# Patient Record
Sex: FEMALE | Race: ASIAN | NOT HISPANIC OR LATINO
[De-identification: names, ages, dates, MRNs, and addresses within clinical notes are randomized per-mention and may not be internally consistent; named-entity substitution may affect disease eponyms.]

---

## 2017-02-28 ENCOUNTER — APPOINTMENT (OUTPATIENT)
Dept: NEUROLOGY | Facility: HOSPITAL | Age: 32
End: 2017-02-28

## 2017-02-28 ENCOUNTER — OUTPATIENT (OUTPATIENT)
Dept: OUTPATIENT SERVICES | Facility: HOSPITAL | Age: 32
LOS: 1 days | End: 2017-02-28

## 2017-02-28 VITALS
DIASTOLIC BLOOD PRESSURE: 84 MMHG | BODY MASS INDEX: 25.76 KG/M2 | HEART RATE: 84 BPM | WEIGHT: 140 LBS | HEIGHT: 62 IN | SYSTOLIC BLOOD PRESSURE: 127 MMHG

## 2017-02-28 DIAGNOSIS — G43.909 MIGRAINE, UNSPECIFIED, NOT INTRACTABLE, WITHOUT STATUS MIGRAINOSUS: ICD-10-CM

## 2017-02-28 DIAGNOSIS — M54.81 OCCIPITAL NEURALGIA: ICD-10-CM

## 2017-03-21 ENCOUNTER — APPOINTMENT (OUTPATIENT)
Dept: OBGYN | Facility: HOSPITAL | Age: 32
End: 2017-03-21

## 2017-05-30 ENCOUNTER — APPOINTMENT (OUTPATIENT)
Dept: NEUROLOGY | Facility: HOSPITAL | Age: 32
End: 2017-05-30

## 2017-05-30 ENCOUNTER — OUTPATIENT (OUTPATIENT)
Dept: OUTPATIENT SERVICES | Facility: HOSPITAL | Age: 32
LOS: 1 days | End: 2017-05-30

## 2017-05-30 VITALS
SYSTOLIC BLOOD PRESSURE: 112 MMHG | WEIGHT: 137 LBS | HEIGHT: 62 IN | BODY MASS INDEX: 25.21 KG/M2 | HEART RATE: 75 BPM | DIASTOLIC BLOOD PRESSURE: 72 MMHG

## 2017-05-30 DIAGNOSIS — G43.909 MIGRAINE, UNSPECIFIED, NOT INTRACTABLE, WITHOUT STATUS MIGRAINOSUS: ICD-10-CM

## 2017-05-30 RX ORDER — GABAPENTIN 100 MG/1
100 CAPSULE ORAL
Qty: 90 | Refills: 3 | Status: DISCONTINUED | COMMUNITY
Start: 2017-02-28 | End: 2017-05-30

## 2017-06-16 ENCOUNTER — APPOINTMENT (OUTPATIENT)
Dept: DERMATOLOGY | Facility: HOSPITAL | Age: 32
End: 2017-06-16

## 2017-06-20 ENCOUNTER — LABORATORY RESULT (OUTPATIENT)
Age: 32
End: 2017-06-20

## 2017-06-20 ENCOUNTER — OUTPATIENT (OUTPATIENT)
Dept: OUTPATIENT SERVICES | Facility: HOSPITAL | Age: 32
LOS: 1 days | End: 2017-06-20

## 2017-06-20 ENCOUNTER — APPOINTMENT (OUTPATIENT)
Dept: OBGYN | Facility: HOSPITAL | Age: 32
End: 2017-06-20

## 2017-06-20 ENCOUNTER — RESULT REVIEW (OUTPATIENT)
Age: 32
End: 2017-06-20

## 2017-06-20 VITALS
HEART RATE: 87 BPM | WEIGHT: 134 LBS | HEIGHT: 62 IN | DIASTOLIC BLOOD PRESSURE: 77 MMHG | BODY MASS INDEX: 24.66 KG/M2 | SYSTOLIC BLOOD PRESSURE: 118 MMHG

## 2017-06-20 DIAGNOSIS — R10.2 PELVIC AND PERINEAL PAIN: ICD-10-CM

## 2017-06-20 DIAGNOSIS — Z01.419 ENCOUNTER FOR GYNECOLOGICAL EXAMINATION (GENERAL) (ROUTINE) WITHOUT ABNORMAL FINDINGS: ICD-10-CM

## 2017-06-20 LAB
ALBUMIN SERPL ELPH-MCNC: 4.4 G/DL — SIGNIFICANT CHANGE UP (ref 3.3–5)
ALP SERPL-CCNC: 100 U/L — SIGNIFICANT CHANGE UP (ref 40–120)
ALT FLD-CCNC: 19 U/L — SIGNIFICANT CHANGE UP (ref 4–33)
AST SERPL-CCNC: 14 U/L — SIGNIFICANT CHANGE UP (ref 4–32)
BASOPHILS # BLD AUTO: 0.03 K/UL — SIGNIFICANT CHANGE UP (ref 0–0.2)
BASOPHILS NFR BLD AUTO: 0.4 % — SIGNIFICANT CHANGE UP (ref 0–2)
BILIRUB SERPL-MCNC: < 0.2 MG/DL — LOW (ref 0.2–1.2)
BUN SERPL-MCNC: 11 MG/DL — SIGNIFICANT CHANGE UP (ref 7–23)
CALCIUM SERPL-MCNC: 9.3 MG/DL — SIGNIFICANT CHANGE UP (ref 8.4–10.5)
CHLORIDE SERPL-SCNC: 101 MMOL/L — SIGNIFICANT CHANGE UP (ref 98–107)
CO2 SERPL-SCNC: 22 MMOL/L — SIGNIFICANT CHANGE UP (ref 22–31)
CREAT SERPL-MCNC: 0.59 MG/DL — SIGNIFICANT CHANGE UP (ref 0.5–1.3)
EOSINOPHIL # BLD AUTO: 0.49 K/UL — SIGNIFICANT CHANGE UP (ref 0–0.5)
EOSINOPHIL NFR BLD AUTO: 5.7 % — SIGNIFICANT CHANGE UP (ref 0–6)
GLUCOSE SERPL-MCNC: 99 MG/DL — SIGNIFICANT CHANGE UP (ref 70–99)
HBA1C BLD-MCNC: 6 % — HIGH (ref 4–5.6)
HCT VFR BLD CALC: 36.8 % — SIGNIFICANT CHANGE UP (ref 34.5–45)
HGB BLD-MCNC: 11 G/DL — LOW (ref 11.5–15.5)
HIV1 AG SER QL: SIGNIFICANT CHANGE UP
HIV1+2 AB SPEC QL: SIGNIFICANT CHANGE UP
IMM GRANULOCYTES NFR BLD AUTO: 0.2 % — SIGNIFICANT CHANGE UP (ref 0–1.5)
LYMPHOCYTES # BLD AUTO: 3.03 K/UL — SIGNIFICANT CHANGE UP (ref 1–3.3)
LYMPHOCYTES # BLD AUTO: 35.4 % — SIGNIFICANT CHANGE UP (ref 13–44)
MCHC RBC-ENTMCNC: 21.6 PG — LOW (ref 27–34)
MCHC RBC-ENTMCNC: 29.9 % — LOW (ref 32–36)
MCV RBC AUTO: 72.2 FL — LOW (ref 80–100)
MONOCYTES # BLD AUTO: 0.35 K/UL — SIGNIFICANT CHANGE UP (ref 0–0.9)
MONOCYTES NFR BLD AUTO: 4.1 % — SIGNIFICANT CHANGE UP (ref 2–14)
NEUTROPHILS # BLD AUTO: 4.63 K/UL — SIGNIFICANT CHANGE UP (ref 1.8–7.4)
NEUTROPHILS NFR BLD AUTO: 54.2 % — SIGNIFICANT CHANGE UP (ref 43–77)
PLATELET # BLD AUTO: 275 K/UL — SIGNIFICANT CHANGE UP (ref 150–400)
PMV BLD: 11.3 FL — SIGNIFICANT CHANGE UP (ref 7–13)
POTASSIUM SERPL-MCNC: 3.9 MMOL/L — SIGNIFICANT CHANGE UP (ref 3.5–5.3)
POTASSIUM SERPL-SCNC: 3.9 MMOL/L — SIGNIFICANT CHANGE UP (ref 3.5–5.3)
PROT SERPL-MCNC: 7.6 G/DL — SIGNIFICANT CHANGE UP (ref 6–8.3)
RBC # BLD: 5.1 M/UL — SIGNIFICANT CHANGE UP (ref 3.8–5.2)
RBC # FLD: 16.9 % — HIGH (ref 10.3–14.5)
SODIUM SERPL-SCNC: 139 MMOL/L — SIGNIFICANT CHANGE UP (ref 135–145)
WBC # BLD: 8.55 K/UL — SIGNIFICANT CHANGE UP (ref 3.8–10.5)
WBC # FLD AUTO: 8.55 K/UL — SIGNIFICANT CHANGE UP (ref 3.8–10.5)

## 2017-06-21 LAB
C TRACH RRNA SPEC QL NAA+PROBE: SIGNIFICANT CHANGE UP
HBV SURFACE AG SER-ACNC: NONREACTIVE — SIGNIFICANT CHANGE UP
HCV AB S/CO SERPL IA: 0.08 S/CO — SIGNIFICANT CHANGE UP
HCV AB SERPL-IMP: SIGNIFICANT CHANGE UP
N GONORRHOEA RRNA SPEC QL NAA+PROBE: SIGNIFICANT CHANGE UP
SPECIMEN SOURCE: SIGNIFICANT CHANGE UP
T PALLIDUM AB TITR SER: NEGATIVE — SIGNIFICANT CHANGE UP

## 2017-06-22 LAB
CANDIDA AB TITR SER: NOT DETECTED — SIGNIFICANT CHANGE UP
G VAGINALIS DNA SPEC QL NAA+PROBE: NOT DETECTED — SIGNIFICANT CHANGE UP
T VAGINALIS SPEC QL WET PREP: NOT DETECTED — SIGNIFICANT CHANGE UP

## 2017-07-21 ENCOUNTER — APPOINTMENT (OUTPATIENT)
Dept: DERMATOLOGY | Facility: HOSPITAL | Age: 32
End: 2017-07-21

## 2017-07-21 ENCOUNTER — OUTPATIENT (OUTPATIENT)
Dept: OUTPATIENT SERVICES | Facility: HOSPITAL | Age: 32
LOS: 1 days | End: 2017-07-21

## 2017-07-21 VITALS
HEART RATE: 70 BPM | BODY MASS INDEX: 24.84 KG/M2 | HEIGHT: 62 IN | DIASTOLIC BLOOD PRESSURE: 73 MMHG | WEIGHT: 135 LBS | SYSTOLIC BLOOD PRESSURE: 112 MMHG

## 2017-07-21 DIAGNOSIS — L30.9 DERMATITIS, UNSPECIFIED: ICD-10-CM

## 2017-07-21 DIAGNOSIS — L21.9 SEBORRHEIC DERMATITIS, UNSPECIFIED: ICD-10-CM

## 2017-09-05 ENCOUNTER — OUTPATIENT (OUTPATIENT)
Dept: OUTPATIENT SERVICES | Facility: HOSPITAL | Age: 32
LOS: 1 days | End: 2017-09-05

## 2017-09-05 ENCOUNTER — APPOINTMENT (OUTPATIENT)
Dept: NEUROLOGY | Facility: HOSPITAL | Age: 32
End: 2017-09-05

## 2017-09-05 VITALS
HEART RATE: 80 BPM | DIASTOLIC BLOOD PRESSURE: 79 MMHG | WEIGHT: 135 LBS | SYSTOLIC BLOOD PRESSURE: 110 MMHG | BODY MASS INDEX: 24.84 KG/M2 | HEIGHT: 62 IN

## 2017-10-06 ENCOUNTER — APPOINTMENT (OUTPATIENT)
Dept: DERMATOLOGY | Facility: HOSPITAL | Age: 32
End: 2017-10-06

## 2017-12-05 ENCOUNTER — OUTPATIENT (OUTPATIENT)
Dept: OUTPATIENT SERVICES | Facility: HOSPITAL | Age: 32
LOS: 1 days | End: 2017-12-05

## 2017-12-05 ENCOUNTER — APPOINTMENT (OUTPATIENT)
Dept: NEUROLOGY | Facility: HOSPITAL | Age: 32
End: 2017-12-05

## 2017-12-05 VITALS
DIASTOLIC BLOOD PRESSURE: 55 MMHG | HEIGHT: 62 IN | SYSTOLIC BLOOD PRESSURE: 108 MMHG | HEART RATE: 75 BPM | WEIGHT: 135 LBS | BODY MASS INDEX: 24.84 KG/M2

## 2018-01-02 ENCOUNTER — OUTPATIENT (OUTPATIENT)
Dept: OUTPATIENT SERVICES | Facility: HOSPITAL | Age: 33
LOS: 1 days | End: 2018-01-02

## 2018-01-02 ENCOUNTER — APPOINTMENT (OUTPATIENT)
Dept: NEUROLOGY | Facility: HOSPITAL | Age: 33
End: 2018-01-02

## 2018-01-02 VITALS
HEART RATE: 89 BPM | BODY MASS INDEX: 25.58 KG/M2 | WEIGHT: 139 LBS | DIASTOLIC BLOOD PRESSURE: 77 MMHG | HEIGHT: 62 IN | SYSTOLIC BLOOD PRESSURE: 120 MMHG

## 2018-01-02 DIAGNOSIS — G43.909 MIGRAINE, UNSPECIFIED, NOT INTRACTABLE, WITHOUT STATUS MIGRAINOSUS: ICD-10-CM

## 2018-01-05 ENCOUNTER — APPOINTMENT (OUTPATIENT)
Dept: DERMATOLOGY | Facility: HOSPITAL | Age: 33
End: 2018-01-05
Payer: MEDICAID

## 2018-01-05 ENCOUNTER — OUTPATIENT (OUTPATIENT)
Dept: OUTPATIENT SERVICES | Facility: HOSPITAL | Age: 33
LOS: 1 days | End: 2018-01-05

## 2018-01-05 VITALS
SYSTOLIC BLOOD PRESSURE: 116 MMHG | HEIGHT: 62 IN | HEART RATE: 81 BPM | WEIGHT: 135 LBS | DIASTOLIC BLOOD PRESSURE: 72 MMHG | BODY MASS INDEX: 24.84 KG/M2

## 2018-01-05 PROCEDURE — 99213 OFFICE O/P EST LOW 20 MIN: CPT | Mod: GC,NC

## 2018-01-08 DIAGNOSIS — L21.9 SEBORRHEIC DERMATITIS, UNSPECIFIED: ICD-10-CM

## 2018-01-08 DIAGNOSIS — L70.0 ACNE VULGARIS: ICD-10-CM

## 2018-03-02 ENCOUNTER — OUTPATIENT (OUTPATIENT)
Dept: OUTPATIENT SERVICES | Facility: HOSPITAL | Age: 33
LOS: 1 days | End: 2018-03-02

## 2018-03-02 ENCOUNTER — APPOINTMENT (OUTPATIENT)
Dept: DERMATOLOGY | Facility: HOSPITAL | Age: 33
End: 2018-03-02
Payer: MEDICAID

## 2018-03-02 VITALS
HEIGHT: 62 IN | DIASTOLIC BLOOD PRESSURE: 79 MMHG | BODY MASS INDEX: 25.58 KG/M2 | SYSTOLIC BLOOD PRESSURE: 113 MMHG | WEIGHT: 139 LBS | HEART RATE: 74 BPM

## 2018-03-02 PROCEDURE — 99212 OFFICE O/P EST SF 10 MIN: CPT | Mod: GC,NC

## 2018-03-05 DIAGNOSIS — L21.9 SEBORRHEIC DERMATITIS, UNSPECIFIED: ICD-10-CM

## 2018-03-05 DIAGNOSIS — L70.0 ACNE VULGARIS: ICD-10-CM

## 2018-03-06 ENCOUNTER — APPOINTMENT (OUTPATIENT)
Dept: NEUROLOGY | Facility: HOSPITAL | Age: 33
End: 2018-03-06

## 2018-03-06 ENCOUNTER — OUTPATIENT (OUTPATIENT)
Dept: OUTPATIENT SERVICES | Facility: HOSPITAL | Age: 33
LOS: 1 days | End: 2018-03-06

## 2018-03-06 VITALS
SYSTOLIC BLOOD PRESSURE: 122 MMHG | HEIGHT: 62 IN | WEIGHT: 139.5 LBS | BODY MASS INDEX: 25.67 KG/M2 | DIASTOLIC BLOOD PRESSURE: 81 MMHG | HEART RATE: 80 BPM

## 2018-03-07 DIAGNOSIS — G43.909 MIGRAINE, UNSPECIFIED, NOT INTRACTABLE, WITHOUT STATUS MIGRAINOSUS: ICD-10-CM

## 2018-04-03 ENCOUNTER — OUTPATIENT (OUTPATIENT)
Dept: OUTPATIENT SERVICES | Facility: HOSPITAL | Age: 33
LOS: 1 days | End: 2018-04-03

## 2018-04-03 ENCOUNTER — APPOINTMENT (OUTPATIENT)
Dept: NEUROLOGY | Facility: HOSPITAL | Age: 33
End: 2018-04-03

## 2018-04-03 VITALS
WEIGHT: 130 LBS | HEIGHT: 62 IN | DIASTOLIC BLOOD PRESSURE: 79 MMHG | SYSTOLIC BLOOD PRESSURE: 118 MMHG | BODY MASS INDEX: 23.92 KG/M2 | HEART RATE: 78 BPM

## 2018-04-04 DIAGNOSIS — G43.909 MIGRAINE, UNSPECIFIED, NOT INTRACTABLE, WITHOUT STATUS MIGRAINOSUS: ICD-10-CM

## 2018-06-01 ENCOUNTER — APPOINTMENT (OUTPATIENT)
Dept: DERMATOLOGY | Facility: HOSPITAL | Age: 33
End: 2018-06-01
Payer: MEDICAID

## 2018-06-01 ENCOUNTER — OUTPATIENT (OUTPATIENT)
Dept: OUTPATIENT SERVICES | Facility: HOSPITAL | Age: 33
LOS: 1 days | End: 2018-06-01

## 2018-06-01 VITALS
DIASTOLIC BLOOD PRESSURE: 73 MMHG | HEIGHT: 62 IN | BODY MASS INDEX: 24.29 KG/M2 | WEIGHT: 132 LBS | SYSTOLIC BLOOD PRESSURE: 117 MMHG | HEART RATE: 88 BPM

## 2018-06-01 DIAGNOSIS — L70.0 ACNE VULGARIS: ICD-10-CM

## 2018-06-01 DIAGNOSIS — L21.9 SEBORRHEIC DERMATITIS, UNSPECIFIED: ICD-10-CM

## 2018-06-01 PROCEDURE — 99213 OFFICE O/P EST LOW 20 MIN: CPT | Mod: NC

## 2018-06-05 ENCOUNTER — OUTPATIENT (OUTPATIENT)
Dept: OUTPATIENT SERVICES | Facility: HOSPITAL | Age: 33
LOS: 1 days | End: 2018-06-05

## 2018-06-05 ENCOUNTER — APPOINTMENT (OUTPATIENT)
Dept: NEUROLOGY | Facility: HOSPITAL | Age: 33
End: 2018-06-05

## 2018-06-05 VITALS
WEIGHT: 135 LBS | DIASTOLIC BLOOD PRESSURE: 62 MMHG | HEART RATE: 72 BPM | SYSTOLIC BLOOD PRESSURE: 111 MMHG | BODY MASS INDEX: 24.84 KG/M2 | HEIGHT: 62 IN

## 2018-06-05 DIAGNOSIS — G89.29 OTHER CHRONIC PAIN: ICD-10-CM

## 2018-08-21 ENCOUNTER — LABORATORY RESULT (OUTPATIENT)
Age: 33
End: 2018-08-21

## 2018-08-21 ENCOUNTER — APPOINTMENT (OUTPATIENT)
Dept: OBGYN | Facility: HOSPITAL | Age: 33
End: 2018-08-21

## 2018-08-21 ENCOUNTER — OUTPATIENT (OUTPATIENT)
Dept: OUTPATIENT SERVICES | Facility: HOSPITAL | Age: 33
LOS: 1 days | End: 2018-08-21

## 2018-08-21 VITALS
HEIGHT: 62 IN | HEART RATE: 89 BPM | WEIGHT: 140 LBS | BODY MASS INDEX: 25.76 KG/M2 | SYSTOLIC BLOOD PRESSURE: 114 MMHG | DIASTOLIC BLOOD PRESSURE: 81 MMHG

## 2018-08-21 LAB
ALBUMIN SERPL ELPH-MCNC: 4.7 G/DL — SIGNIFICANT CHANGE UP (ref 3.3–5)
ALP SERPL-CCNC: 121 U/L — HIGH (ref 40–120)
ALT FLD-CCNC: 15 U/L — SIGNIFICANT CHANGE UP (ref 4–33)
AST SERPL-CCNC: 18 U/L — SIGNIFICANT CHANGE UP (ref 4–32)
BASOPHILS # BLD AUTO: 0.09 K/UL — SIGNIFICANT CHANGE UP (ref 0–0.2)
BASOPHILS NFR BLD AUTO: 1 % — SIGNIFICANT CHANGE UP (ref 0–2)
BILIRUB SERPL-MCNC: < 0.2 MG/DL — LOW (ref 0.2–1.2)
BUN SERPL-MCNC: 13 MG/DL — SIGNIFICANT CHANGE UP (ref 7–23)
CALCIUM SERPL-MCNC: 9.5 MG/DL — SIGNIFICANT CHANGE UP (ref 8.4–10.5)
CHLORIDE SERPL-SCNC: 103 MMOL/L — SIGNIFICANT CHANGE UP (ref 98–107)
CO2 SERPL-SCNC: 23 MMOL/L — SIGNIFICANT CHANGE UP (ref 22–31)
CREAT SERPL-MCNC: 0.75 MG/DL — SIGNIFICANT CHANGE UP (ref 0.5–1.3)
EOSINOPHIL # BLD AUTO: 0.65 K/UL — HIGH (ref 0–0.5)
EOSINOPHIL NFR BLD AUTO: 7.4 % — HIGH (ref 0–6)
GLUCOSE SERPL-MCNC: 74 MG/DL — SIGNIFICANT CHANGE UP (ref 70–99)
HCT VFR BLD CALC: 38.5 % — SIGNIFICANT CHANGE UP (ref 34.5–45)
HGB BLD-MCNC: 11.5 G/DL — SIGNIFICANT CHANGE UP (ref 11.5–15.5)
IMM GRANULOCYTES # BLD AUTO: 0.04 # — SIGNIFICANT CHANGE UP
IMM GRANULOCYTES NFR BLD AUTO: 0.5 % — SIGNIFICANT CHANGE UP (ref 0–1.5)
LYMPHOCYTES # BLD AUTO: 3.34 K/UL — HIGH (ref 1–3.3)
LYMPHOCYTES # BLD AUTO: 37.8 % — SIGNIFICANT CHANGE UP (ref 13–44)
MCHC RBC-ENTMCNC: 21.8 PG — LOW (ref 27–34)
MCHC RBC-ENTMCNC: 29.9 % — LOW (ref 32–36)
MCV RBC AUTO: 73.1 FL — LOW (ref 80–100)
MONOCYTES # BLD AUTO: 0.42 K/UL — SIGNIFICANT CHANGE UP (ref 0–0.9)
MONOCYTES NFR BLD AUTO: 4.8 % — SIGNIFICANT CHANGE UP (ref 2–14)
NEUTROPHILS # BLD AUTO: 4.29 K/UL — SIGNIFICANT CHANGE UP (ref 1.8–7.4)
NEUTROPHILS NFR BLD AUTO: 48.5 % — SIGNIFICANT CHANGE UP (ref 43–77)
NRBC # FLD: 0 — SIGNIFICANT CHANGE UP
PLATELET # BLD AUTO: 303 K/UL — SIGNIFICANT CHANGE UP (ref 150–400)
PMV BLD: 11.5 FL — SIGNIFICANT CHANGE UP (ref 7–13)
POTASSIUM SERPL-MCNC: 3.9 MMOL/L — SIGNIFICANT CHANGE UP (ref 3.5–5.3)
POTASSIUM SERPL-SCNC: 3.9 MMOL/L — SIGNIFICANT CHANGE UP (ref 3.5–5.3)
PROT SERPL-MCNC: 8.5 G/DL — HIGH (ref 6–8.3)
RBC # BLD: 5.27 M/UL — HIGH (ref 3.8–5.2)
RBC # FLD: 16.4 % — HIGH (ref 10.3–14.5)
SODIUM SERPL-SCNC: 138 MMOL/L — SIGNIFICANT CHANGE UP (ref 135–145)
WBC # BLD: 8.83 K/UL — SIGNIFICANT CHANGE UP (ref 3.8–10.5)
WBC # FLD AUTO: 8.83 K/UL — SIGNIFICANT CHANGE UP (ref 3.8–10.5)

## 2018-08-22 DIAGNOSIS — Z01.419 ENCOUNTER FOR GYNECOLOGICAL EXAMINATION (GENERAL) (ROUTINE) WITHOUT ABNORMAL FINDINGS: ICD-10-CM

## 2018-08-22 LAB
HBV SURFACE AG SER-ACNC: NONREACTIVE — SIGNIFICANT CHANGE UP
HCV AB S/CO SERPL IA: 0.07 S/CO — SIGNIFICANT CHANGE UP
HCV AB SERPL-IMP: SIGNIFICANT CHANGE UP
HIV 1+2 AB+HIV1 P24 AG SERPL QL IA: SIGNIFICANT CHANGE UP
T PALLIDUM AB TITR SER: NEGATIVE — SIGNIFICANT CHANGE UP

## 2018-08-23 LAB
C TRACH RRNA SPEC QL NAA+PROBE: SIGNIFICANT CHANGE UP
N GONORRHOEA RRNA SPEC QL NAA+PROBE: SIGNIFICANT CHANGE UP
SPECIMEN SOURCE: SIGNIFICANT CHANGE UP

## 2018-09-11 ENCOUNTER — OUTPATIENT (OUTPATIENT)
Dept: OUTPATIENT SERVICES | Facility: HOSPITAL | Age: 33
LOS: 1 days | End: 2018-09-11

## 2018-09-11 ENCOUNTER — APPOINTMENT (OUTPATIENT)
Dept: NEUROLOGY | Facility: HOSPITAL | Age: 33
End: 2018-09-11

## 2018-09-11 VITALS
WEIGHT: 139 LBS | SYSTOLIC BLOOD PRESSURE: 106 MMHG | HEART RATE: 87 BPM | DIASTOLIC BLOOD PRESSURE: 70 MMHG | BODY MASS INDEX: 25.58 KG/M2 | HEIGHT: 62 IN

## 2018-09-11 DIAGNOSIS — G43.909 MIGRAINE, UNSPECIFIED, NOT INTRACTABLE, W/OUT STATUS MIGRAINOSUS: ICD-10-CM

## 2018-09-11 DIAGNOSIS — G43.909 MIGRAINE, UNSPECIFIED, NOT INTRACTABLE, WITHOUT STATUS MIGRAINOSUS: ICD-10-CM

## 2018-10-19 ENCOUNTER — APPOINTMENT (OUTPATIENT)
Dept: DERMATOLOGY | Facility: HOSPITAL | Age: 33
End: 2018-10-19

## 2018-10-29 ENCOUNTER — APPOINTMENT (OUTPATIENT)
Dept: OTOLARYNGOLOGY | Facility: CLINIC | Age: 33
End: 2018-10-29
Payer: MEDICAID

## 2018-10-29 ENCOUNTER — OUTPATIENT (OUTPATIENT)
Dept: OUTPATIENT SERVICES | Facility: HOSPITAL | Age: 33
LOS: 1 days | Discharge: HOME | End: 2018-10-29

## 2018-10-29 VITALS
HEIGHT: 62 IN | DIASTOLIC BLOOD PRESSURE: 81 MMHG | SYSTOLIC BLOOD PRESSURE: 121 MMHG | BODY MASS INDEX: 25.58 KG/M2 | WEIGHT: 139 LBS | HEART RATE: 90 BPM

## 2018-10-29 DIAGNOSIS — J30.9 ALLERGIC RHINITIS, UNSPECIFIED: ICD-10-CM

## 2018-10-29 PROCEDURE — 31231 NASAL ENDOSCOPY DX: CPT

## 2018-10-29 PROCEDURE — 99203 OFFICE O/P NEW LOW 30 MIN: CPT | Mod: 25

## 2018-11-02 DIAGNOSIS — J30.9 ALLERGIC RHINITIS, UNSPECIFIED: ICD-10-CM

## 2019-03-14 ENCOUNTER — OUTPATIENT (OUTPATIENT)
Dept: OUTPATIENT SERVICES | Facility: HOSPITAL | Age: 34
LOS: 1 days | End: 2019-03-14
Payer: COMMERCIAL

## 2019-03-14 ENCOUNTER — APPOINTMENT (OUTPATIENT)
Dept: NEUROLOGY | Facility: HOSPITAL | Age: 34
End: 2019-03-14

## 2019-03-14 VITALS
HEART RATE: 80 BPM | WEIGHT: 139 LBS | RESPIRATION RATE: 14 BRPM | SYSTOLIC BLOOD PRESSURE: 115 MMHG | BODY MASS INDEX: 25.58 KG/M2 | DIASTOLIC BLOOD PRESSURE: 81 MMHG | HEIGHT: 62 IN

## 2019-03-14 DIAGNOSIS — R56.9 UNSPECIFIED CONVULSIONS: ICD-10-CM

## 2019-03-14 DIAGNOSIS — G43.709 CHRONIC MIGRAINE W/OUT AURA, NOT INTRACTABLE, W/OUT STATUS MIGRAINOSUS: ICD-10-CM

## 2019-03-14 DIAGNOSIS — G43.709 CHRONIC MIGRAINE WITHOUT AURA, NOT INTRACTABLE, WITHOUT STATUS MIGRAINOSUS: ICD-10-CM

## 2019-03-14 PROCEDURE — G0463: CPT

## 2019-03-14 RX ORDER — TOPIRAMATE 25 MG/1
25 TABLET, FILM COATED ORAL
Qty: 60 | Refills: 2 | Status: DISCONTINUED | COMMUNITY
Start: 2017-12-05 | End: 2019-03-14

## 2019-03-14 RX ORDER — TRIAMCINOLONE ACETONIDE 1 MG/G
0.1 CREAM TOPICAL TWICE DAILY
Qty: 1 | Refills: 3 | Status: COMPLETED | COMMUNITY
Start: 2017-07-21 | End: 2019-03-14

## 2019-03-14 RX ORDER — CLOBETASOL PROPIONATE 0.5 MG/G
0.05 AEROSOL, FOAM TOPICAL
Qty: 1 | Refills: 3 | Status: COMPLETED | COMMUNITY
Start: 2017-07-21 | End: 2019-03-14

## 2019-03-14 RX ORDER — FLUOCINONIDE 0.5 MG/ML
0.05 SOLUTION TOPICAL
Qty: 1 | Refills: 3 | Status: COMPLETED | COMMUNITY
Start: 2018-01-05 | End: 2019-03-14

## 2019-03-14 RX ORDER — SUMATRIPTAN 100 MG/1
100 TABLET, FILM COATED ORAL
Qty: 9 | Refills: 0 | Status: COMPLETED | COMMUNITY
Start: 2017-12-05 | End: 2019-03-14

## 2019-03-14 NOTE — PHYSICAL EXAM
[Person] : oriented to person [Place] : oriented to place [Time] : oriented to time [Concentration Intact] : normal concentrating ability [Visual Intact] : visual attention was ~T not ~L decreased [Naming Objects] : no difficulty naming common objects [Repeating Phrases] : no difficulty repeating a phrase [Writing A Sentence] : no difficulty writing a sentence [Fluency] : fluency intact [Comprehension] : comprehension intact [Reading] : reading intact [Past History] : adequate knowledge of personal past history [Cranial Nerves Optic (II)] : visual acuity intact bilaterally,  visual fields full to confrontation, pupils equal round and reactive to light [Cranial Nerves Oculomotor (III)] : extraocular motion intact [Cranial Nerves Trigeminal (V)] : facial sensation intact symmetrically [Cranial Nerves Facial (VII)] : face symmetrical [Cranial Nerves Vestibulocochlear (VIII)] : hearing was intact bilaterally [Cranial Nerves Glossopharyngeal (IX)] : tongue and palate midline [Cranial Nerves Accessory (XI - Cranial And Spinal)] : head turning and shoulder shrug symmetric [Cranial Nerves Hypoglossal (XII)] : there was no tongue deviation with protrusion [Motor Tone] : muscle tone was normal in all four extremities [Motor Strength] : muscle strength was normal in all four extremities [No Muscle Atrophy] : normal bulk in all four extremities [Sensation Tactile Decrease] : light touch was intact [Balance] : balance was intact [2+] : Ankle jerk left 2+ [Sclera] : the sclera and conjunctiva were normal [PERRL With Normal Accommodation] : pupils were equal in size, round, reactive to light, with normal accommodation [Neck Appearance] : the appearance of the neck was normal [] : no respiratory distress [Abnormal Walk] : normal gait [Skin Color & Pigmentation] : normal skin color and pigmentation [Past-pointing] : there was no past-pointing [Tremor] : no tremor present [Plantar Reflex Right Only] : normal on the right [Plantar Reflex Left Only] : normal on the left

## 2019-03-14 NOTE — DISCUSSION/SUMMARY
[FreeTextEntry1] : 31YO woman with history of migraines here for follow up. Patient is taking topiramate 50 mg daily without adverse effects. Takes CoQ10 as well and Rizatriptan as abortive. Headaches are stable, however she continues to have HA 2-3x/week. Will increase Topamax.\par \par Plan\par 1. Increase Topamax to 25mg am/ 50mg qhs (script for 25mg given)\par 2. Continue Co-Q 10 and Rizatriptan as abortive. \par 3. F/u in 3 months

## 2019-03-14 NOTE — HISTORY OF PRESENT ILLNESS
[FreeTextEntry1] : 33 yo woman with history of migraines here for follow up. \par headache frequency has remained stable 2-3x/wk although she notes improvement in severity/quality of headache. She notes she gets worse migraines about 2-3 d prior to her menses lasting a few days consecutively. Continues to take Topamax 50 daily with no side effects. Takes CoQ 10. Takes Rizatriptan as abortive - uses it about once every 2 months. \par Currently has a HA ~5-6/10, has had it daily since last Friday, which was a few days before her menses began. Last use Rizatriptan last week x1. \par No change in the quality of her headaches. No associated vision changes, numbness/tingling or weakness. No dizziness/lightheadedness.

## 2019-04-02 ENCOUNTER — APPOINTMENT (OUTPATIENT)
Dept: NEUROLOGY | Facility: HOSPITAL | Age: 34
End: 2019-04-02

## 2019-06-04 ENCOUNTER — APPOINTMENT (OUTPATIENT)
Dept: NEUROLOGY | Facility: HOSPITAL | Age: 34
End: 2019-06-04

## 2019-06-13 ENCOUNTER — APPOINTMENT (OUTPATIENT)
Dept: NEUROLOGY | Facility: HOSPITAL | Age: 34
End: 2019-06-13

## 2019-09-05 ENCOUNTER — OUTPATIENT (OUTPATIENT)
Dept: OUTPATIENT SERVICES | Facility: HOSPITAL | Age: 34
LOS: 1 days | End: 2019-09-05
Payer: COMMERCIAL

## 2019-09-05 ENCOUNTER — APPOINTMENT (OUTPATIENT)
Dept: NEUROLOGY | Facility: HOSPITAL | Age: 34
End: 2019-09-05

## 2019-09-05 VITALS
DIASTOLIC BLOOD PRESSURE: 72 MMHG | BODY MASS INDEX: 26.13 KG/M2 | HEIGHT: 62 IN | SYSTOLIC BLOOD PRESSURE: 106 MMHG | HEART RATE: 85 BPM | WEIGHT: 142 LBS

## 2019-09-05 DIAGNOSIS — R56.9 UNSPECIFIED CONVULSIONS: ICD-10-CM

## 2019-09-05 DIAGNOSIS — G43.909 MIGRAINE, UNSPECIFIED, NOT INTRACTABLE, WITHOUT STATUS MIGRAINOSUS: ICD-10-CM

## 2019-09-05 PROCEDURE — G0463: CPT

## 2019-09-05 NOTE — DISCUSSION/SUMMARY
[FreeTextEntry1] : 31YO woman with history of migraines here for follow up. Patient is taking topiramate 25mg / 50 mg daily without adverse effects. Takes CoQ10 as well and Rizatriptan as abortive but seldomly. Headaches are stable and much less frequent. \par \par Plan\par 1. c/w Topamax to 25mg am / 50mg qhs (script for 25mg given and instructed to take two pills at night)\par 2. Continue Co-Q 10 and Rizatriptan as abortive. \par 3. F/u in 4-6  months. \par 4. ENT fu and PMD annual check up advised\par

## 2019-09-05 NOTE — PHYSICAL EXAM
[FreeTextEntry1] : Neurologic: \par Orientation: oriented to person, oriented to place and oriented to time. \par Attention: normal concentrating ability and visual attention was not decreased. \par Language: no difficulty naming common objects, no difficulty repeating a phrase, no difficulty writing a sentence, fluency intact, comprehension intact and reading intact. \par Fund of knowledge: displays adequate knowledge of personal past history. \par Cranial Nerves: visual acuity intact bilaterally, visual fields full to confrontation, pupils equal round and reactive to light, extraocular motion intact, facial sensation intact symmetrically, face symmetrical, hearing was intact bilaterally, tongue and palate midline, head turning and shoulder shrug symmetric and there was no tongue deviation with protrusion. \par Motor: muscle tone was normal in all four extremities, muscle strength was normal in all four extremities and normal bulk in all four extremities. \par Sensory exam: light touch was intact. \par Coordination:. normal gait. balance was intact. there was no past-pointing. no tremor present. \par Deep tendon reflexes: 2+ throughout\par Plantar responses normal on the right, normal on the left.  \par Eyes: the sclera and conjunctiva were normal and pupils were equal in size, round, reactive to light, with normal accommodation. \par Neck: the appearance of the neck was normal. \par Pulmonary: no respiratory distress. \par Musculoskeletal: normal gait. \par Skin: normal skin color and pigmentation. \par

## 2019-09-05 NOTE — HISTORY OF PRESENT ILLNESS
[FreeTextEntry1] : Interval history - Pt has been doing well on topomax. She reports a significant decrease in headache frequency. However does report a burning tingling sensation at the top of her head when she does get a headache. She also reports having sinus problems and is able to differentiate between a headache and sinus symptoms. She saw an ENT doctor last year for which the pt reports she received a washout and needs to follow up again. Overall pt feels she is much better as far as the headache.\par \par Prior history - 31 yo woman with history of migraines here for follow up. \par headache frequency has remained stable 2-3x/wk although she notes improvement in severity/quality of headache. She notes she gets worse migraines about 2-3 d prior to her menses lasting a few days consecutively. Continues to take Topamax 50 daily with no side effects. Takes CoQ 10. Takes Rizatriptan as abortive - uses it about once every 2 months. \par Currently has a HA ~5-6/10, has had it daily since last Friday, which was a few days before her menses began. Last use Rizatriptan last week x1. \par No change in the quality of her headaches. No associated vision changes, numbness/tingling or weakness. No dizziness/lightheadedness. \par

## 2019-10-18 ENCOUNTER — EMERGENCY (EMERGENCY)
Facility: HOSPITAL | Age: 34
LOS: 1 days | Discharge: HOME | End: 2019-10-18
Attending: EMERGENCY MEDICINE | Admitting: EMERGENCY MEDICINE
Payer: MEDICAID

## 2019-10-18 VITALS
RESPIRATION RATE: 16 BRPM | HEART RATE: 60 BPM | SYSTOLIC BLOOD PRESSURE: 109 MMHG | TEMPERATURE: 98 F | DIASTOLIC BLOOD PRESSURE: 57 MMHG | OXYGEN SATURATION: 100 %

## 2019-10-18 LAB
ALBUMIN SERPL ELPH-MCNC: 4.6 G/DL — SIGNIFICANT CHANGE UP (ref 3.3–5)
ALP SERPL-CCNC: 119 U/L — SIGNIFICANT CHANGE UP (ref 40–120)
ALT FLD-CCNC: 17 U/L — SIGNIFICANT CHANGE UP (ref 4–33)
ANION GAP SERPL CALC-SCNC: 13 MMO/L — SIGNIFICANT CHANGE UP (ref 7–14)
AST SERPL-CCNC: 16 U/L — SIGNIFICANT CHANGE UP (ref 4–32)
BASE EXCESS BLDV CALC-SCNC: 0.5 MMOL/L — SIGNIFICANT CHANGE UP
BASOPHILS # BLD AUTO: 0.05 K/UL — SIGNIFICANT CHANGE UP (ref 0–0.2)
BASOPHILS NFR BLD AUTO: 0.4 % — SIGNIFICANT CHANGE UP (ref 0–2)
BILIRUB SERPL-MCNC: 0.3 MG/DL — SIGNIFICANT CHANGE UP (ref 0.2–1.2)
BLOOD GAS VENOUS - CREATININE: 0.51 MG/DL — SIGNIFICANT CHANGE UP (ref 0.5–1.3)
BUN SERPL-MCNC: 12 MG/DL — SIGNIFICANT CHANGE UP (ref 7–23)
CALCIUM SERPL-MCNC: 9.5 MG/DL — SIGNIFICANT CHANGE UP (ref 8.4–10.5)
CHLORIDE BLDV-SCNC: 103 MMOL/L — SIGNIFICANT CHANGE UP (ref 96–108)
CHLORIDE SERPL-SCNC: 100 MMOL/L — SIGNIFICANT CHANGE UP (ref 98–107)
CO2 SERPL-SCNC: 22 MMOL/L — SIGNIFICANT CHANGE UP (ref 22–31)
CREAT SERPL-MCNC: 0.48 MG/DL — LOW (ref 0.5–1.3)
EOSINOPHIL # BLD AUTO: 0.05 K/UL — SIGNIFICANT CHANGE UP (ref 0–0.5)
EOSINOPHIL NFR BLD AUTO: 0.4 % — SIGNIFICANT CHANGE UP (ref 0–6)
GAS PNL BLDV: 135 MMOL/L — LOW (ref 136–146)
GLUCOSE BLDV-MCNC: 113 MG/DL — HIGH (ref 70–99)
GLUCOSE SERPL-MCNC: 113 MG/DL — HIGH (ref 70–99)
HCG SERPL-ACNC: < 5 MIU/ML — SIGNIFICANT CHANGE UP
HCO3 BLDV-SCNC: 24 MMOL/L — SIGNIFICANT CHANGE UP (ref 20–27)
HCT VFR BLD CALC: 40.3 % — SIGNIFICANT CHANGE UP (ref 34.5–45)
HCT VFR BLDV CALC: 36.1 % — SIGNIFICANT CHANGE UP (ref 34.5–45)
HGB BLD-MCNC: 11.6 G/DL — SIGNIFICANT CHANGE UP (ref 11.5–15.5)
HGB BLDV-MCNC: 11.7 G/DL — SIGNIFICANT CHANGE UP (ref 11.5–15.5)
IMM GRANULOCYTES NFR BLD AUTO: 0.4 % — SIGNIFICANT CHANGE UP (ref 0–1.5)
LACTATE BLDV-MCNC: 1.7 MMOL/L — SIGNIFICANT CHANGE UP (ref 0.5–2)
LYMPHOCYTES # BLD AUTO: 1.46 K/UL — SIGNIFICANT CHANGE UP (ref 1–3.3)
LYMPHOCYTES # BLD AUTO: 10.9 % — LOW (ref 13–44)
MAGNESIUM SERPL-MCNC: 1.6 MG/DL — SIGNIFICANT CHANGE UP (ref 1.6–2.6)
MCHC RBC-ENTMCNC: 20.5 PG — LOW (ref 27–34)
MCHC RBC-ENTMCNC: 28.8 % — LOW (ref 32–36)
MCV RBC AUTO: 71.1 FL — LOW (ref 80–100)
MONOCYTES # BLD AUTO: 0.33 K/UL — SIGNIFICANT CHANGE UP (ref 0–0.9)
MONOCYTES NFR BLD AUTO: 2.5 % — SIGNIFICANT CHANGE UP (ref 2–14)
NEUTROPHILS # BLD AUTO: 11.5 K/UL — HIGH (ref 1.8–7.4)
NEUTROPHILS NFR BLD AUTO: 85.4 % — HIGH (ref 43–77)
NRBC # FLD: 0 K/UL — SIGNIFICANT CHANGE UP (ref 0–0)
PCO2 BLDV: 48 MMHG — SIGNIFICANT CHANGE UP (ref 41–51)
PH BLDV: 7.34 PH — SIGNIFICANT CHANGE UP (ref 7.32–7.43)
PHOSPHATE SERPL-MCNC: 2.7 MG/DL — SIGNIFICANT CHANGE UP (ref 2.5–4.5)
PLATELET # BLD AUTO: 307 K/UL — SIGNIFICANT CHANGE UP (ref 150–400)
PMV BLD: 12 FL — SIGNIFICANT CHANGE UP (ref 7–13)
PO2 BLDV: 31 MMHG — LOW (ref 35–40)
POTASSIUM BLDV-SCNC: 3.9 MMOL/L — SIGNIFICANT CHANGE UP (ref 3.4–4.5)
POTASSIUM SERPL-MCNC: 4.1 MMOL/L — SIGNIFICANT CHANGE UP (ref 3.5–5.3)
POTASSIUM SERPL-SCNC: 4.1 MMOL/L — SIGNIFICANT CHANGE UP (ref 3.5–5.3)
PROT SERPL-MCNC: 8.3 G/DL — SIGNIFICANT CHANGE UP (ref 6–8.3)
RBC # BLD: 5.67 M/UL — HIGH (ref 3.8–5.2)
RBC # FLD: 16.9 % — HIGH (ref 10.3–14.5)
SAO2 % BLDV: 52.6 % — LOW (ref 60–85)
SODIUM SERPL-SCNC: 135 MMOL/L — SIGNIFICANT CHANGE UP (ref 135–145)
WBC # BLD: 13.45 K/UL — HIGH (ref 3.8–10.5)
WBC # FLD AUTO: 13.45 K/UL — HIGH (ref 3.8–10.5)

## 2019-10-18 PROCEDURE — 99284 EMERGENCY DEPT VISIT MOD MDM: CPT

## 2019-10-18 PROCEDURE — 70450 CT HEAD/BRAIN W/O DYE: CPT | Mod: 26

## 2019-10-18 RX ORDER — METOCLOPRAMIDE HCL 10 MG
10 TABLET ORAL ONCE
Refills: 0 | Status: COMPLETED | OUTPATIENT
Start: 2019-10-18 | End: 2019-10-18

## 2019-10-18 RX ORDER — SODIUM CHLORIDE 9 MG/ML
1000 INJECTION INTRAMUSCULAR; INTRAVENOUS; SUBCUTANEOUS ONCE
Refills: 0 | Status: COMPLETED | OUTPATIENT
Start: 2019-10-18 | End: 2019-10-18

## 2019-10-18 RX ORDER — KETOROLAC TROMETHAMINE 30 MG/ML
30 SYRINGE (ML) INJECTION ONCE
Refills: 0 | Status: DISCONTINUED | OUTPATIENT
Start: 2019-10-18 | End: 2019-10-18

## 2019-10-18 RX ORDER — FAMOTIDINE 10 MG/ML
20 INJECTION INTRAVENOUS ONCE
Refills: 0 | Status: COMPLETED | OUTPATIENT
Start: 2019-10-18 | End: 2019-10-18

## 2019-10-18 RX ADMIN — Medication 10 MILLIGRAM(S): at 14:30

## 2019-10-18 RX ADMIN — FAMOTIDINE 20 MILLIGRAM(S): 10 INJECTION INTRAVENOUS at 14:30

## 2019-10-18 RX ADMIN — SODIUM CHLORIDE 1000 MILLILITER(S): 9 INJECTION INTRAMUSCULAR; INTRAVENOUS; SUBCUTANEOUS at 14:30

## 2019-10-18 RX ADMIN — Medication 30 MILLILITER(S): at 14:45

## 2019-10-18 RX ADMIN — Medication 30 MILLIGRAM(S): at 15:59

## 2019-10-18 NOTE — ED ADULT TRIAGE NOTE - CHIEF COMPLAINT QUOTE
Pt. c/o epigastric pain starting this AM with 11 episodes of vomiting. Denies diarrhea or fevers. No pmhx

## 2019-10-18 NOTE — ED PROVIDER NOTE - CLINICAL SUMMARY MEDICAL DECISION MAKING FREE TEXT BOX
34F Hx of migraines p/w severe HA worse than prior migraine and associated N/V and epigastric pain. CTH to r/o bleed. Possible gastroenteritis vs migraine

## 2019-10-18 NOTE — ED PROVIDER NOTE - NSFOLLOWUPINSTRUCTIONS_ED_ALL_ED_FT
You came to the hospital with headaches as well as nausea and vomiting. CT scan of your head did not show any cause of your headache. You symptoms improved with medications. If your symptoms worsen please return to the emergency department.

## 2019-10-18 NOTE — ED PROVIDER NOTE - ATTENDING CONTRIBUTION TO CARE
Dr. Lockhart:  I have personally performed a face to face bedside history and physical examination of this patient. I have discussed the history, examination, review of systems, assessment and plan of management with the resident. I have reviewed the electronic medical record and amended it to reflect my history, review of systems, physical exam, assessment and plan.    34F h/o migraines presents with severe headache, N/V, epigastric pain since this morning.      Exam:  - appears uncomfortable  - rrr  - ctab  - abd soft ntnd  - no focal neuro deficits    A/P  - likely migraine recurrence, eval pancreatitis, check ucg  - cbc, cmp, lipase, ua, urine culture, urine preg  - reglan, IVF Dr. Lockhart:  I have personally performed a face to face bedside history and physical examination of this patient. I have discussed the history, examination, review of systems, assessment and plan of management with the resident. I have reviewed the electronic medical record and amended it to reflect my history, review of systems, physical exam, assessment and plan.    34F h/o migraines presents with severe headache, N/V, epigastric pain since this morning.      Exam:  - appears uncomfortable  - rrr  - ctab  - abd soft ntnd  - no focal neuro deficits    A/P  - likely migraine recurrence, r/o intracranial pathology, eval pancreatitis, check ucg  - cbc, cmp, lipase, ua, urine culture, urine preg  - reglan, IVF. CT head

## 2019-10-18 NOTE — ED PROVIDER NOTE - PATIENT PORTAL LINK FT
You can access the FollowMyHealth Patient Portal offered by [facility] by registering at the following website: [email or web address]. By joining [employer]’s FollowMyHealth portal, you will also be able to view your health information using other applications (apps) compatible with our system.

## 2019-10-18 NOTE — ED PROVIDER NOTE - OBJECTIVE STATEMENT
Pt is a 35y/o F with PMHx of Migraines p/w N/V, HA and epigastric pain.  Pt reports Sx began at 6am this morning when she woke up. Has had emesis x12 (watery/yellow) since this morning, burning epigastric pain and severe burning HA. Pain woke her from sleep. Pt reports prior migraines with nausea that resolved after emesis, but has never had Sx this severe. No fever/chills, Pt is a 35y/o F with PMHx of Migraines p/w N/V, HA and epigastric pain.  Pt reports Sx began at 6am this morning when she woke up. Has had emesis x12 (watery/yellow) since this morning, burning epigastric pain and severe burning HA. Pain woke her from sleep. Pt reports prior migraines with nausea that resolved after emesis, but has never had Sx this severe. No fever/chills, no CP, no vision changes. No dietary changes, recent travel or sick contacts.

## 2020-11-08 ENCOUNTER — EMERGENCY (EMERGENCY)
Facility: HOSPITAL | Age: 35
LOS: 1 days | Discharge: HOME | End: 2020-11-08
Attending: STUDENT IN AN ORGANIZED HEALTH CARE EDUCATION/TRAINING PROGRAM | Admitting: STUDENT IN AN ORGANIZED HEALTH CARE EDUCATION/TRAINING PROGRAM
Payer: COMMERCIAL

## 2020-11-08 VITALS
DIASTOLIC BLOOD PRESSURE: 68 MMHG | HEIGHT: 62 IN | OXYGEN SATURATION: 99 % | HEART RATE: 98 BPM | SYSTOLIC BLOOD PRESSURE: 113 MMHG | RESPIRATION RATE: 18 BRPM | TEMPERATURE: 98 F

## 2020-11-08 PROBLEM — G43.909 MIGRAINE, UNSPECIFIED, NOT INTRACTABLE, WITHOUT STATUS MIGRAINOSUS: Chronic | Status: ACTIVE | Noted: 2019-10-18

## 2020-11-08 PROCEDURE — 99283 EMERGENCY DEPT VISIT LOW MDM: CPT

## 2020-11-08 RX ORDER — IBUPROFEN 200 MG
400 TABLET ORAL ONCE
Refills: 0 | Status: COMPLETED | OUTPATIENT
Start: 2020-11-08 | End: 2020-11-08

## 2020-11-08 RX ORDER — CIPROFLOXACIN AND DEXAMETHASONE 3; 1 MG/ML; MG/ML
4 SUSPENSION/ DROPS AURICULAR (OTIC)
Refills: 0 | Status: DISCONTINUED | OUTPATIENT
Start: 2020-11-08 | End: 2020-11-11

## 2020-11-08 RX ADMIN — Medication 400 MILLIGRAM(S): at 12:50

## 2020-11-08 RX ADMIN — CIPROFLOXACIN AND DEXAMETHASONE 4 DROP(S): 3; 1 SUSPENSION/ DROPS AURICULAR (OTIC) at 12:54

## 2020-11-08 NOTE — ED ADULT NURSE REASSESSMENT NOTE - NS ED NURSE REASSESS COMMENT FT1
intake pt coming righ ear pain since yest. no drainage, no fever, medicated and instructions given how and when to apply ear drops.     pt D/C home,     Claire Sheth RN

## 2020-11-08 NOTE — ED PROVIDER NOTE - NSFOLLOWUPINSTRUCTIONS_ED_ALL_ED_FT
Follow up with your Primary Medical Doctor in 1-2 days.  Ciprodex ear drops 4 drops to right ear 2 x a day x 7 days.  Take Ibuprofen 400mg orally every 6 hours as needed for pain take with food.  Return to the ER for any persistent/worsening or new symptoms fevers, chills, increased pain, change in hearing or any concerning symptoms.

## 2020-11-08 NOTE — ED PROVIDER NOTE - CLINICAL SUMMARY MEDICAL DECISION MAKING FREE TEXT BOX
34 y/o female with no significant PMHx presents to the ER c/o right ear pain x 2 days.  Mild swelling noted to ear canal will tx with Ciprodex, pain control, follow up PMD.

## 2020-11-08 NOTE — ED ADULT TRIAGE NOTE - CHIEF COMPLAINT QUOTE
Patient has c/o right ear pain for 2 days. Pt states she has sinus problems as well so she feels it might have something to do with it.

## 2020-11-08 NOTE — ED PROVIDER NOTE - PATIENT PORTAL LINK FT
You can access the FollowMyHealth Patient Portal offered by [facility] by registering at the following website: [email or web address]. By joining [employer]’s FollowMyHealth portal, you will also be able to view your health information using other applications (apps) compatible with our system.

## 2020-11-08 NOTE — ED PROVIDER NOTE - OBJECTIVE STATEMENT
34 y/o female with no significant PMHx presents to the ER c/o right ear pain x 2 days.  Pt denies fevers, chills, cough, weakness, dizziness, change in hearing.

## 2020-11-08 NOTE — ED PROVIDER NOTE - ATTENDING CONTRIBUTION TO CARE
34y/o F  with no significant PMHx presents to the ER w/ right ear pain x 2 days. She denies fever, chills, chest pain, SOB. states pain is 6/10 accompanied by itching didn't take any medicaitons prior to arrival lmp was oct 25th.   denies fever, chills, chest pain, SOB, abdominal pain, diarrhea, dysuria, syncope, bleeding, new rash,weakness, numbness, blurred vision    ROS  otherwise negative as per HPI  Gen: Awake, Alert, WD, WN, NAD  Head:  NC/AT  Eyes:  PERRL, EOMI, Conjunctiva pink, lids normal, no scleral icterus  ENT: OP clear, no exudates, no erythema, uvula midline, TMs mild right sided erythemay, moist mucus membranes  Neck: supple, nontender, no meningismus, no JVD, trachea midline  Cardiac/CV:  S1 S2, RRR, no M/G/R  Respiratory/Pulm:  CTAB, good air movement, normal resp effort, no wheezes/stridor/retractions/rales/rhonchi  Gastrointestinal/Abdomen:  Soft, nontender, nondistended, +BS, no rebound/guarding  Back:  no CVAT, no MLT  Ext:  warm, well perfused, moving all extremities spontaneously, no peripheral edema, distal pulses intact  Skin: intact, no rash  Neuro:  AAOx3, sensation intact, motor 5/5 x 4 extremities, normal gait, speech clear

## 2021-11-17 ENCOUNTER — RESULT REVIEW (OUTPATIENT)
Age: 36
End: 2021-11-17

## 2021-11-17 ENCOUNTER — OUTPATIENT (OUTPATIENT)
Dept: OUTPATIENT SERVICES | Facility: HOSPITAL | Age: 36
LOS: 1 days | End: 2021-11-17

## 2021-11-17 ENCOUNTER — RESULT CHARGE (OUTPATIENT)
Age: 36
End: 2021-11-17

## 2021-11-17 ENCOUNTER — APPOINTMENT (OUTPATIENT)
Dept: OBGYN | Facility: HOSPITAL | Age: 36
End: 2021-11-17

## 2021-11-17 VITALS
WEIGHT: 146 LBS | BODY MASS INDEX: 26.87 KG/M2 | SYSTOLIC BLOOD PRESSURE: 108 MMHG | DIASTOLIC BLOOD PRESSURE: 67 MMHG | HEIGHT: 62 IN | TEMPERATURE: 97.7 F | HEART RATE: 66 BPM

## 2021-11-17 DIAGNOSIS — Z31.69 ENCOUNTER FOR OTHER GENERAL COUNSELING AND ADVICE ON PROCREATION: ICD-10-CM

## 2021-11-17 DIAGNOSIS — Z01.419 ENCOUNTER FOR GYNECOLOGICAL EXAMINATION (GENERAL) (ROUTINE) WITHOUT ABNORMAL FINDINGS: ICD-10-CM

## 2021-11-17 LAB
A1C WITH ESTIMATED AVERAGE GLUCOSE RESULT: 5.6 % — SIGNIFICANT CHANGE UP (ref 4–5.6)
ALBUMIN SERPL ELPH-MCNC: 4.6 G/DL — SIGNIFICANT CHANGE UP (ref 3.3–5)
ALP SERPL-CCNC: 85 U/L — SIGNIFICANT CHANGE UP (ref 40–120)
ALT FLD-CCNC: 13 U/L — SIGNIFICANT CHANGE UP (ref 4–33)
ANION GAP SERPL CALC-SCNC: 11 MMOL/L — SIGNIFICANT CHANGE UP (ref 7–14)
AST SERPL-CCNC: 14 U/L — SIGNIFICANT CHANGE UP (ref 4–32)
BASOPHILS # BLD AUTO: 0.08 K/UL — SIGNIFICANT CHANGE UP (ref 0–0.2)
BASOPHILS NFR BLD AUTO: 1 % — SIGNIFICANT CHANGE UP (ref 0–2)
BILIRUB SERPL-MCNC: 0.3 MG/DL — SIGNIFICANT CHANGE UP (ref 0.2–1.2)
BUN SERPL-MCNC: 11 MG/DL — SIGNIFICANT CHANGE UP (ref 7–23)
CALCIUM SERPL-MCNC: 9.5 MG/DL — SIGNIFICANT CHANGE UP (ref 8.4–10.5)
CHLORIDE SERPL-SCNC: 103 MMOL/L — SIGNIFICANT CHANGE UP (ref 98–107)
CO2 SERPL-SCNC: 25 MMOL/L — SIGNIFICANT CHANGE UP (ref 22–31)
CREAT SERPL-MCNC: 0.5 MG/DL — SIGNIFICANT CHANGE UP (ref 0.5–1.3)
EOSINOPHIL # BLD AUTO: 0.56 K/UL — HIGH (ref 0–0.5)
EOSINOPHIL NFR BLD AUTO: 7.3 % — HIGH (ref 0–6)
ESTIMATED AVERAGE GLUCOSE: 114 — SIGNIFICANT CHANGE UP
GLUCOSE SERPL-MCNC: 92 MG/DL — SIGNIFICANT CHANGE UP (ref 70–99)
HCG UR QL: NEGATIVE
HCT VFR BLD CALC: 40.1 % — SIGNIFICANT CHANGE UP (ref 34.5–45)
HGB BLD-MCNC: 12.3 G/DL — SIGNIFICANT CHANGE UP (ref 11.5–15.5)
HIV 1+2 AB+HIV1 P24 AG SERPL QL IA: SIGNIFICANT CHANGE UP
IANC: 3.72 K/UL — SIGNIFICANT CHANGE UP (ref 1.5–8.5)
IMM GRANULOCYTES NFR BLD AUTO: 0.3 % — SIGNIFICANT CHANGE UP (ref 0–1.5)
LYMPHOCYTES # BLD AUTO: 2.92 K/UL — SIGNIFICANT CHANGE UP (ref 1–3.3)
LYMPHOCYTES # BLD AUTO: 37.9 % — SIGNIFICANT CHANGE UP (ref 13–44)
MCHC RBC-ENTMCNC: 23.5 PG — LOW (ref 27–34)
MCHC RBC-ENTMCNC: 30.7 GM/DL — LOW (ref 32–36)
MCV RBC AUTO: 76.5 FL — LOW (ref 80–100)
MONOCYTES # BLD AUTO: 0.4 K/UL — SIGNIFICANT CHANGE UP (ref 0–0.9)
MONOCYTES NFR BLD AUTO: 5.2 % — SIGNIFICANT CHANGE UP (ref 2–14)
NEUTROPHILS # BLD AUTO: 3.72 K/UL — SIGNIFICANT CHANGE UP (ref 1.8–7.4)
NEUTROPHILS NFR BLD AUTO: 48.3 % — SIGNIFICANT CHANGE UP (ref 43–77)
NRBC # BLD: 0 /100 WBCS — SIGNIFICANT CHANGE UP
NRBC # FLD: 0 K/UL — SIGNIFICANT CHANGE UP
PLATELET # BLD AUTO: 270 K/UL — SIGNIFICANT CHANGE UP (ref 150–400)
POTASSIUM SERPL-MCNC: 4 MMOL/L — SIGNIFICANT CHANGE UP (ref 3.5–5.3)
POTASSIUM SERPL-SCNC: 4 MMOL/L — SIGNIFICANT CHANGE UP (ref 3.5–5.3)
PROT SERPL-MCNC: 7.7 G/DL — SIGNIFICANT CHANGE UP (ref 6–8.3)
QUALITY CONTROL: YES
RBC # BLD: 5.24 M/UL — HIGH (ref 3.8–5.2)
RBC # FLD: 14.7 % — HIGH (ref 10.3–14.5)
SODIUM SERPL-SCNC: 139 MMOL/L — SIGNIFICANT CHANGE UP (ref 135–145)
T4 FREE SERPL-MCNC: 1.4 NG/DL — SIGNIFICANT CHANGE UP (ref 0.9–1.8)
TSH SERPL-MCNC: 1.54 UIU/ML — SIGNIFICANT CHANGE UP (ref 0.27–4.2)
WBC # BLD: 7.7 K/UL — SIGNIFICANT CHANGE UP (ref 3.8–10.5)
WBC # FLD AUTO: 7.7 K/UL — SIGNIFICANT CHANGE UP (ref 3.8–10.5)

## 2021-11-17 NOTE — HISTORY OF PRESENT ILLNESS
[Currently Active] : currently active [Men] : men [Vaginal] : vaginal [No] : No [Patient would like to be screened for STIs] : Patient would like to be screened for STIs [FreeTextEntry1] : 35 yo  LMP 21 presents for GYN annual. She states her period in November was unusual in that it was 7 days late and started  instead of  when she was expecting it. She is normally very regular with cycles every 28 days with 5 days bleeding. She states she also had dizziness and sensitivity to smells during her period which is unusal for her. She is actively trying to conceive and has been having unprotected intercourse since January. She states she is having sex about once per week, occasionally twice per week. She is not tracking her periods. Is taking PNV. This is the same father as her other child. \par Last pap 2017 negative \par

## 2021-11-17 NOTE — PHYSICAL EXAM
[Chaperone Present] : A chaperone was present in the examining room during all aspects of the physical examination [Appropriately responsive] : appropriately responsive [Alert] : alert [No Acute Distress] : no acute distress [No Lymphadenopathy] : no lymphadenopathy [Regular Rate Rhythm] : regular rate rhythm [No Murmurs] : no murmurs [Clear to Auscultation B/L] : clear to auscultation bilaterally [Soft] : soft [Non-tender] : non-tender [Non-distended] : non-distended [No HSM] : No HSM [No Lesions] : no lesions [No Mass] : no mass [Oriented x3] : oriented x3 [Examination Of The Breasts] : a normal appearance [No Masses] : no breast masses were palpable [Labia Majora] : normal [Labia Minora] : normal [Scant] : There was scant vaginal bleeding [Normal] : normal [Uterine Adnexae] : normal [FreeTextEntry4] : scant brown blood

## 2021-11-17 NOTE — DISCUSSION/SUMMARY
[FreeTextEntry1] : 37 yo  LMP 21 presents for GYN annual and preconception counseling. \par \par 1. GYN annual\par -pap with HPV today\par -routine labs\par -STI testing\par \par 2. Preconception\par -reviewed potential causes of menstrual irregularity including hormonal, structural, pregnancy\par -UCG negative today\par -will check TFTs\par -as pt is not having timed intercourse would allow pt to continue to try on her own before doing workup. Reviewed timed intercourse in detail of sex every day or every other day for 5-6 days in the middle of her cycle during ovulation\par -can also use ovulation trackers\par -continue PNV\par \par RTC 6 months for followup

## 2021-11-18 LAB
C TRACH RRNA SPEC QL NAA+PROBE: SIGNIFICANT CHANGE UP
C TRACH+GC RRNA SPEC QL PROBE: SIGNIFICANT CHANGE UP
HBV SURFACE AG SER-ACNC: SIGNIFICANT CHANGE UP
HCV AB S/CO SERPL IA: 0.12 S/CO — SIGNIFICANT CHANGE UP (ref 0–0.99)
HCV AB SERPL-IMP: SIGNIFICANT CHANGE UP
HPV HIGH+LOW RISK DNA PNL CVX: SIGNIFICANT CHANGE UP
N GONORRHOEA RRNA SPEC QL NAA+PROBE: SIGNIFICANT CHANGE UP
T PALLIDUM AB TITR SER: NEGATIVE — SIGNIFICANT CHANGE UP

## 2021-11-22 LAB — CYTOLOGY SPEC DOC CYTO: SIGNIFICANT CHANGE UP

## 2022-01-10 ENCOUNTER — APPOINTMENT (OUTPATIENT)
Dept: OBGYN | Facility: CLINIC | Age: 37
End: 2022-01-10
Payer: COMMERCIAL

## 2022-01-10 VITALS — DIASTOLIC BLOOD PRESSURE: 79 MMHG | BODY MASS INDEX: 26.34 KG/M2 | WEIGHT: 144 LBS | SYSTOLIC BLOOD PRESSURE: 119 MMHG

## 2022-01-10 PROCEDURE — 36415 COLL VENOUS BLD VENIPUNCTURE: CPT

## 2022-01-10 PROCEDURE — 99203 OFFICE O/P NEW LOW 30 MIN: CPT

## 2022-01-10 NOTE — PLAN
[FreeTextEntry1] : 36 yr s/p DNC for MAB \par received 4 units PRBC s/p DNC \par bhcg and CBC, uac  sent today \par f/u in one week for GYN sono and repeat BHCG

## 2022-01-10 NOTE — HISTORY OF PRESENT ILLNESS
[N] : Patient does not use contraception [Y] : Positive pregnancy history [LMPDate] : 11/12/2021 [MensesFreq] : 30 [MensesLength] : 5 [PGHxTotal] : 2 [[facility] HospitalxFulerm] : 1 [PGHxPremature] : 0 [PGHxAbortions] : 1 [[facility] Care Corporationiving] : 1 [FreeTextEntry1] : NVD1  [Currently Active] : currently active [Men] : men [No] : No

## 2022-01-12 LAB
APPEARANCE: CLEAR
BACTERIA: NEGATIVE
BASOPHILS # BLD AUTO: 0.08 K/UL
BASOPHILS NFR BLD AUTO: 0.7 %
BILIRUBIN URINE: NEGATIVE
BLOOD URINE: ABNORMAL
COLOR: NORMAL
EOSINOPHIL # BLD AUTO: 0.6 K/UL
EOSINOPHIL NFR BLD AUTO: 5.2 %
GLUCOSE QUALITATIVE U: NEGATIVE
HCG SERPL-MCNC: 664 MIU/ML
HCT VFR BLD CALC: 34.4 %
HGB BLD-MCNC: 10.6 G/DL
HYALINE CASTS: 0 /LPF
IMM GRANULOCYTES NFR BLD AUTO: 0.4 %
KETONES URINE: NEGATIVE
LEUKOCYTE ESTERASE URINE: NEGATIVE
LYMPHOCYTES # BLD AUTO: 3.54 K/UL
LYMPHOCYTES NFR BLD AUTO: 30.5 %
MAN DIFF?: NORMAL
MCHC RBC-ENTMCNC: 27.2 PG
MCHC RBC-ENTMCNC: 30.8 GM/DL
MCV RBC AUTO: 88.4 FL
MICROSCOPIC-UA: NORMAL
MONOCYTES # BLD AUTO: 0.77 K/UL
MONOCYTES NFR BLD AUTO: 6.6 %
NEUTROPHILS # BLD AUTO: 6.55 K/UL
NEUTROPHILS NFR BLD AUTO: 56.6 %
NITRITE URINE: NEGATIVE
PH URINE: 6.5
PLATELET # BLD AUTO: 391 K/UL
PROTEIN URINE: NORMAL
RBC # BLD: 3.89 M/UL
RBC # FLD: 16 %
RED BLOOD CELLS URINE: 2 /HPF
SPECIFIC GRAVITY URINE: 1.02
SQUAMOUS EPITHELIAL CELLS: 4 /HPF
UROBILINOGEN URINE: NORMAL
WBC # FLD AUTO: 11.59 K/UL
WHITE BLOOD CELLS URINE: 1 /HPF

## 2022-01-18 ENCOUNTER — APPOINTMENT (OUTPATIENT)
Dept: OBGYN | Facility: CLINIC | Age: 37
End: 2022-01-18
Payer: COMMERCIAL

## 2022-01-18 ENCOUNTER — APPOINTMENT (OUTPATIENT)
Dept: ANTEPARTUM | Facility: CLINIC | Age: 37
End: 2022-01-18
Payer: COMMERCIAL

## 2022-01-18 VITALS — WEIGHT: 146 LBS | DIASTOLIC BLOOD PRESSURE: 78 MMHG | BODY MASS INDEX: 26.7 KG/M2 | SYSTOLIC BLOOD PRESSURE: 119 MMHG

## 2022-01-18 PROCEDURE — 99213 OFFICE O/P EST LOW 20 MIN: CPT

## 2022-01-18 PROCEDURE — 36415 COLL VENOUS BLD VENIPUNCTURE: CPT

## 2022-01-18 PROCEDURE — 76830 TRANSVAGINAL US NON-OB: CPT

## 2022-01-18 PROCEDURE — ZZZZZ: CPT

## 2022-01-18 PROCEDURE — 76856 US EXAM PELVIC COMPLETE: CPT

## 2022-01-18 NOTE — PLAN
[FreeTextEntry1] : 36 yr old NGLORI s/p DNC at [facility] \par - BHCG redrawn \par - US EM lining thin, probable menses last week. 3 cm subserosal fibroid noted on the left lateral aspect of uterus, see US report for further information \par - f/u in 1-2 weeks for PAP.

## 2022-01-18 NOTE — HISTORY OF PRESENT ILLNESS
[FreeTextEntry1] : 36 yr old s/p DNC at [facility], here for GYN, F/U MAB sonogram . Patient reports she had heavy bleedin thursday and friday of last week.

## 2022-01-19 LAB — HCG SERPL-MCNC: 115 MIU/ML

## 2022-01-26 ENCOUNTER — APPOINTMENT (OUTPATIENT)
Dept: ANTEPARTUM | Facility: CLINIC | Age: 37
End: 2022-01-26

## 2022-01-26 ENCOUNTER — APPOINTMENT (OUTPATIENT)
Dept: OBGYN | Facility: CLINIC | Age: 37
End: 2022-01-26

## 2022-02-02 ENCOUNTER — APPOINTMENT (OUTPATIENT)
Dept: OBGYN | Facility: CLINIC | Age: 37
End: 2022-02-02
Payer: COMMERCIAL

## 2022-02-02 VITALS
DIASTOLIC BLOOD PRESSURE: 79 MMHG | SYSTOLIC BLOOD PRESSURE: 116 MMHG | BODY MASS INDEX: 27.23 KG/M2 | WEIGHT: 148 LBS | HEIGHT: 62 IN

## 2022-02-02 DIAGNOSIS — Z01.419 ENCOUNTER FOR GYNECOLOGICAL EXAMINATION (GENERAL) (ROUTINE) W/OUT ABNORMAL FINDINGS: ICD-10-CM

## 2022-02-02 DIAGNOSIS — N89.8 OTHER SPECIFIED NONINFLAMMATORY DISORDERS OF VAGINA: ICD-10-CM

## 2022-02-02 PROCEDURE — 99395 PREV VISIT EST AGE 18-39: CPT

## 2022-02-02 RX ORDER — TOPIRAMATE 50 MG/1
50 TABLET, FILM COATED ORAL
Qty: 30 | Refills: 0 | Status: DISCONTINUED | COMMUNITY
Start: 2018-03-06 | End: 2022-02-02

## 2022-02-02 RX ORDER — KETOCONAZOLE 20.5 MG/ML
2 SHAMPOO, SUSPENSION TOPICAL
Qty: 1 | Refills: 3 | Status: DISCONTINUED | COMMUNITY
Start: 2017-07-21 | End: 2022-02-02

## 2022-02-02 RX ORDER — UBIDECARENONE 100 MG
100 CAPSULE ORAL TWICE DAILY
Qty: 60 | Refills: 3 | Status: DISCONTINUED | COMMUNITY
Start: 2018-01-02 | End: 2022-02-02

## 2022-02-02 RX ORDER — AMOXICILLIN 500 MG/1
500 TABLET, FILM COATED ORAL
Qty: 14 | Refills: 0 | Status: DISCONTINUED | COMMUNITY
Start: 2022-01-13 | End: 2022-02-02

## 2022-02-02 RX ORDER — RIZATRIPTAN BENZOATE 10 MG/1
10 TABLET ORAL
Qty: 10 | Refills: 0 | Status: DISCONTINUED | COMMUNITY
Start: 2018-04-03 | End: 2022-02-02

## 2022-02-02 RX ORDER — KETOCONAZOLE 20.5 MG/ML
2 SHAMPOO, SUSPENSION TOPICAL
Qty: 1 | Refills: 6 | Status: DISCONTINUED | COMMUNITY
Start: 2018-06-01 | End: 2022-02-02

## 2022-02-02 RX ORDER — TRETINOIN 0.25 MG/G
0.03 CREAM TOPICAL
Qty: 1 | Refills: 3 | Status: DISCONTINUED | COMMUNITY
Start: 2018-01-05 | End: 2022-02-02

## 2022-02-02 RX ORDER — TRETINOIN 0.5 MG/G
0.05 CREAM TOPICAL
Qty: 45 | Refills: 6 | Status: DISCONTINUED | COMMUNITY
Start: 2018-03-02 | End: 2022-02-02

## 2022-02-02 RX ORDER — TOPIRAMATE 25 MG/1
25 TABLET, FILM COATED ORAL
Qty: 90 | Refills: 6 | Status: DISCONTINUED | COMMUNITY
Start: 2019-03-14 | End: 2022-02-02

## 2022-02-03 LAB
C TRACH RRNA SPEC QL NAA+PROBE: NOT DETECTED
CANDIDA VAG CYTO: NOT DETECTED
G VAGINALIS+PREV SP MTYP VAG QL MICRO: NOT DETECTED
HPV 16 E6+E7 MRNA CVX QL NAA+PROBE: NOT DETECTED
HPV HIGH+LOW RISK DNA PNL CVX: NOT DETECTED
HPV18+45 E6+E7 MRNA CVX QL NAA+PROBE: NOT DETECTED
N GONORRHOEA RRNA SPEC QL NAA+PROBE: NOT DETECTED
SOURCE AMPLIFICATION: NORMAL
T VAGINALIS VAG QL WET PREP: NOT DETECTED

## 2022-02-07 PROBLEM — N89.8 VAGINAL DISCHARGE: Status: ACTIVE | Noted: 2022-02-07

## 2022-02-07 PROBLEM — N89.8 VAGINAL ITCHING: Status: ACTIVE | Noted: 2022-02-07

## 2022-02-07 LAB — CYTOLOGY CVX/VAG DOC THIN PREP: NORMAL

## 2022-02-07 NOTE — PHYSICAL EXAM
[Chaperone Present] : A chaperone was present in the examining room during all aspects of the physical examination [FreeTextEntry1] : Gamaliel [Appropriately responsive] : appropriately responsive [Alert] : alert [No Acute Distress] : no acute distress [Soft] : soft [Non-tender] : non-tender [Non-distended] : non-distended [No HSM] : No HSM [No Lesions] : no lesions [No Mass] : no mass [Oriented x3] : oriented x3 [Examination Of The Breasts] : a normal appearance [No Masses] : no breast masses were palpable [Labia Majora] : normal [Labia Minora] : normal [Normal] : normal [Uterine Adnexae] : normal

## 2022-02-07 NOTE — PLAN
[FreeTextEntry1] : 37 y/o female presenting for annual exam:\par -f/u pap and GC/CT, bd affirm\par -Contraception: patient desires pregnancy\par -f/u PRN\par

## 2022-02-07 NOTE — HISTORY OF PRESENT ILLNESS
[Y] : Positive pregnancy history [Currently Active] : currently active [Men] : men [Vaginal] : vaginal [No] : No [PGHxTotal] : 2 [[facility] HospitalxFulerm] : 1 [PGHxPremature] : 0 [PGHxAbortions] : 0 [[facility] Hospitaliving] : 1 [PGHxABInduced] : 0 [PGHxABSpont] : 0 [PGHxEctopic] : 0 [PGHxMultBirths] : 0 [FreeTextEntry1] :

## 2022-03-07 ENCOUNTER — APPOINTMENT (OUTPATIENT)
Dept: OBGYN | Facility: CLINIC | Age: 37
End: 2022-03-07
Payer: COMMERCIAL

## 2022-03-07 DIAGNOSIS — R53.83 OTHER FATIGUE: ICD-10-CM

## 2022-03-07 DIAGNOSIS — N93.9 ABNORMAL UTERINE AND VAGINAL BLEEDING, UNSPECIFIED: ICD-10-CM

## 2022-03-07 PROCEDURE — 36415 COLL VENOUS BLD VENIPUNCTURE: CPT

## 2022-03-07 PROCEDURE — 99213 OFFICE O/P EST LOW 20 MIN: CPT

## 2022-03-07 RX ORDER — NORETHINDRONE ACETATE AND ETHINYL ESTRADIOL AND FERROUS FUMARATE 1MG-20(21)
1-20 KIT ORAL
Qty: 3 | Refills: 3 | Status: ACTIVE | COMMUNITY
Start: 2022-03-07 | End: 1900-01-01

## 2022-03-07 NOTE — PLAN
[FreeTextEntry1] : 36 yr old s/p MAB and S/P DNC\par - US in 1/2022 showed complete MAB and BHCG was 115. patient reports menses 2/10/2022 and started to bleed again 10 days later and is on and off bleeding \par - AUB panel sent, bhcg sent \par - recommended COCP to regulate bleeding for at least 3 months and then to stop and try to conceive. \par - f/u PRN

## 2022-03-07 NOTE — HISTORY OF PRESENT ILLNESS
[FreeTextEntry1] : 36 yr old s/p MAB and s/p DNC.  Patient reports having on and off bleeding since her MAB and DNC.  [Irregular Menstrual Interval] : irregular menstrual interval [TextBox_28] : on and off bleeding since her MAB and dnc

## 2022-03-08 LAB
ALBUMIN SERPL ELPH-MCNC: 4.6 G/DL
ALP BLD-CCNC: 110 U/L
ALT SERPL-CCNC: 15 U/L
ANION GAP SERPL CALC-SCNC: 15 MMOL/L
AST SERPL-CCNC: 14 U/L
BASOPHILS # BLD AUTO: 0.09 K/UL
BASOPHILS NFR BLD AUTO: 0.8 %
BILIRUB SERPL-MCNC: 0.2 MG/DL
BUN SERPL-MCNC: 18 MG/DL
CALCIUM SERPL-MCNC: 9.7 MG/DL
CHLORIDE SERPL-SCNC: 101 MMOL/L
CO2 SERPL-SCNC: 22 MMOL/L
CREAT SERPL-MCNC: 0.54 MG/DL
EGFR: 123 ML/MIN/1.73M2
EOSINOPHIL # BLD AUTO: 0.59 K/UL
EOSINOPHIL NFR BLD AUTO: 5 %
ESTIMATED AVERAGE GLUCOSE: 114 MG/DL
FERRITIN SERPL-MCNC: 34 NG/ML
FOLATE SERPL-MCNC: 8.4 NG/ML
FSH SERPL-MCNC: 5.5 IU/L
GLUCOSE SERPL-MCNC: 99 MG/DL
HBA1C MFR BLD HPLC: 5.6 %
HCG SERPL-MCNC: <1 MIU/ML
HCT VFR BLD CALC: 41.1 %
HGB BLD-MCNC: 12.4 G/DL
IMM GRANULOCYTES NFR BLD AUTO: 0.3 %
IRON SATN MFR SERPL: 17 %
IRON SERPL-MCNC: 54 UG/DL
LH SERPL-ACNC: 24.2 IU/L
LYMPHOCYTES # BLD AUTO: 3.68 K/UL
LYMPHOCYTES NFR BLD AUTO: 31.3 %
MAN DIFF?: NORMAL
MCHC RBC-ENTMCNC: 24.5 PG
MCHC RBC-ENTMCNC: 30.2 GM/DL
MCV RBC AUTO: 81.2 FL
MONOCYTES # BLD AUTO: 0.58 K/UL
MONOCYTES NFR BLD AUTO: 4.9 %
NEUTROPHILS # BLD AUTO: 6.79 K/UL
NEUTROPHILS NFR BLD AUTO: 57.7 %
PLATELET # BLD AUTO: 311 K/UL
POTASSIUM SERPL-SCNC: 4.1 MMOL/L
PROT SERPL-MCNC: 7.6 G/DL
RBC # BLD: 5.06 M/UL
RBC # FLD: 13.8 %
SODIUM SERPL-SCNC: 139 MMOL/L
T4 FREE SERPL-MCNC: 1.3 NG/DL
T4 SERPL-MCNC: 7.6 UG/DL
TESTOST SERPL-MCNC: 40.6 NG/DL
TIBC SERPL-MCNC: 320 UG/DL
TSH SERPL-ACNC: 2.04 UIU/ML
UIBC SERPL-MCNC: 266 UG/DL
VIT B12 SERPL-MCNC: 667 PG/ML
WBC # FLD AUTO: 11.77 K/UL

## 2022-03-09 LAB
DHEA-S SERPL-MCNC: 405 UG/DL
PROLACTIN SERPL-MCNC: 32.1 NG/ML

## 2022-03-09 RX ORDER — DROSPIRENONE AND ETHINYL ESTRADIOL TABLETS 0.02-3(28)
3-0.02 KIT ORAL DAILY
Qty: 1 | Refills: 6 | Status: ACTIVE | COMMUNITY
Start: 2022-03-09 | End: 1900-01-01

## 2022-03-10 ENCOUNTER — APPOINTMENT (OUTPATIENT)
Dept: OBGYN | Facility: CLINIC | Age: 37
End: 2022-03-10
Payer: COMMERCIAL

## 2022-03-10 PROCEDURE — 36415 COLL VENOUS BLD VENIPUNCTURE: CPT

## 2022-03-11 LAB — PROLACTIN SERPL-MCNC: 23.1 NG/ML

## 2022-08-09 ENCOUNTER — EMERGENCY (EMERGENCY)
Facility: HOSPITAL | Age: 37
LOS: 1 days | Discharge: HOME | End: 2022-08-09
Attending: STUDENT IN AN ORGANIZED HEALTH CARE EDUCATION/TRAINING PROGRAM | Admitting: STUDENT IN AN ORGANIZED HEALTH CARE EDUCATION/TRAINING PROGRAM

## 2022-08-09 VITALS
HEIGHT: 62 IN | SYSTOLIC BLOOD PRESSURE: 106 MMHG | RESPIRATION RATE: 17 BRPM | HEART RATE: 84 BPM | TEMPERATURE: 97 F | OXYGEN SATURATION: 100 % | DIASTOLIC BLOOD PRESSURE: 57 MMHG

## 2022-08-09 PROCEDURE — 73080 X-RAY EXAM OF ELBOW: CPT | Mod: 26,RT

## 2022-08-09 PROCEDURE — 93010 ELECTROCARDIOGRAM REPORT: CPT

## 2022-08-09 PROCEDURE — 73562 X-RAY EXAM OF KNEE 3: CPT | Mod: 26,LT

## 2022-08-09 PROCEDURE — 99284 EMERGENCY DEPT VISIT MOD MDM: CPT | Mod: 25

## 2022-08-09 PROCEDURE — 71046 X-RAY EXAM CHEST 2 VIEWS: CPT | Mod: 26

## 2022-08-09 RX ORDER — IBUPROFEN 200 MG
600 TABLET ORAL ONCE
Refills: 0 | Status: COMPLETED | OUTPATIENT
Start: 2022-08-09 | End: 2022-08-09

## 2022-08-09 RX ORDER — ACETAMINOPHEN 500 MG
975 TABLET ORAL ONCE
Refills: 0 | Status: COMPLETED | OUTPATIENT
Start: 2022-08-09 | End: 2022-08-09

## 2022-08-09 RX ADMIN — Medication 975 MILLIGRAM(S): at 22:48

## 2022-08-09 RX ADMIN — Medication 600 MILLIGRAM(S): at 22:48

## 2022-08-09 NOTE — ED PROVIDER NOTE - NS ED ROS FT
ROS:  GENERAL: No fever, no chills  HEENT: As per HPI   CARDIAC: As per HPI   PULMONARY: no cough, no shortness of breath  GI: As per HPI   SKIN: (+) abrasions to R elbow, L knee  NEURO: As per HPI   MSK: As per HPI   All other systems reviewed as negative.

## 2022-08-09 NOTE — ED PROVIDER NOTE - PHYSICAL EXAMINATION
NEURO:  Awake, alert and cooperative. Gait steady without assistance. No focal weakness or paralysis.  HEAD:  Atraumatic.  Negative Swain's sign.  PSYCH: Mood appropriate to subject. Thought processes intact.   EYES:  PERRL; EOM intact; no entrapment or nystagmus.  NECK:  Non-tender to bony palpation. FROM.   CARD: Regular rate and rhythm.  CHEST/RESP:  No accessory muscle use; breath sounds clear and equal bilaterally. No pain with chest palpation. No ecchymosis or contusions.  ABD:  No ecchymosis or contusions. Soft; non-distended; non-tender. No guarding or rebound.   MUSCULOSKELETAL/EXTREMITIES: FROM in all four extremities. There is tenderness to R medial elbow to palpation, (+) healing superficial abrasion noted without surrounding erythema, warmth, or induration, ROM intact but painful. (+) superficial abrasion to the left knee with tenderness to anterior knee diffusely, ROM intact, patient is ambulatory.  BACK: No ecchymosis or contusions. No bony point-tenderness.  ROM intact.   SKIN:  Warm; dry; no apparent lesions or exudate. No rashes, no abrasions or contusions.  Well hydrated.

## 2022-08-09 NOTE — ED PROVIDER NOTE - PATIENT PORTAL LINK FT
You can access the FollowMyHealth Patient Portal offered by [facility] by registering at the following website: [email or web address]. By joining [employer]’s FollowMyHealth portal, you will also be able to view your health information using other applications (apps) compatible with our system.

## 2022-08-09 NOTE — ED PROVIDER NOTE - OBJECTIVE STATEMENT
37 y/o female with no PMHx present for evaluation s/p fall 6 days ago. Pt states that she was walking when she tripped and fell. Denies feeling dizzy, lightheaded, or having CP prior to falling. No head strike or LOC. She immediately went to  where she underwent a knee and elbow xrays that was negative. Pt states that she went to see her doctor following the fall, 4 days ago, who started her on Amoxicillin for the scrapes on her elbow and her knee. Of note, since then she has been having throbbing pain to the back of her head, CP, and abdominal pain. Denies visual changes, NV, SOB, NVD, paresthesias, neck pain. or back pain. No other voiced complaints.

## 2022-08-09 NOTE — ED PROVIDER NOTE - NSFOLLOWUPCLINICS_GEN_ALL_ED_FT
[facility] [place]  [facility]  .  [place]   Phone: [phone]  Fax:

## 2022-08-09 NOTE — ED PROVIDER NOTE - ATTENDING APP SHARED VISIT CONTRIBUTION OF CARE
I have personally performed a face to face medical and diagnostic evaluation of the patient. I have discussed with and reviewed the DARRIAN's note and agree with the History, ROS, Physical Exam and MDM unless otherwise indicated. A brief summary of my personal evaluation and impression can be found below.    Morena MARIE: 36F no pmh presents with a cc of diffuse body pains, chest discomfort, head discomfort, arm pain, leg pain, s/p mechanical non syncopal fall x6 days ago noted suffered abrasions to R elbow and L knee 2/2 incident, was seen at urgent care, had neg xr, given abx recently by PMD, no head strike no LOC recalls entire event, reports tripped on curb. Pt reports has difficulty characterizing the total body pain. Is able to walk. No nausea, vomiting, no fever, no abdominal or back pain.     All other ROS negative, except as above and as per HPI and ROS section.    VITALS: Initial triage and subsequent vitals have been reviewed by me.  GEN APPEARANCE: WDWN, alert, non-toxic, NAD  HEAD: Atraumatic.  EYES: PERRLa, EOMI, vision grossly intact.   NECK: Supple  CV: RRR, S1S2, no c/r/m/g. Cap refill <2 seconds. No bruits.   LUNGS: CTAB. No abnormal breath sounds.  ABDOMEN: Soft, NTND. No guarding or rebound.   MSK/EXT: as above, otherwise no spinal point tenderness. No CVA ttp. Pelvis stable. No peripheral edema.  NEURO: Alert, follows commands. Weight bearing normal. Speech normal. Sensation and motor normal x4 extremities.   SKIN: abrasions to R elbow, L knee, appear healing well, Warm, dry and intact. No rash.  PSYCH: Appropriate    Plan/MDM: oMrena MARIE: 36F no pmh presents with a cc of diffuse body pains, chest discomfort, head discomfort, arm pain, leg pain, s/p mechanical non syncopal fall x6 days ago noted suffered abrasions to R elbow and L knee 2/2 incident, was seen at urgent care, had neg xr, given abx recently by PMD, no head strike no LOC recalls entire event, reports tripped on curb. exam vss non toxic PE as above, PE is grossly reassuring,  ddx unclear etiology of pts body pains, low c/f fx, acs, rhabdo or acute life threatening traumatic injury, wounds appear clean, well healing, will get xr screening ekg meds as needed, reassess, likely dc.

## 2022-08-09 NOTE — ED PROVIDER NOTE - CLINICAL SUMMARY MEDICAL DECISION MAKING FREE TEXT BOX
37 y/o female with no PMHx present for evaluation s/p fall 6 days ago. Pt states that she was walking when she tripped and fell. Underwent XRs at OSH that were negative. Since starting Abx pt with new symptoms including HA, CP, and abd pain. Will repeat XRs to r/o fx and obtain EKG to assess for ischemia. Dispo pending.
detailed exam

## 2022-08-09 NOTE — ED PROVIDER NOTE - NS ED ATTENDING STATEMENT MOD
This was a shared visit with the DARRIAN. I reviewed and verified the documentation and independently performed the documented:

## 2022-08-09 NOTE — ED PROVIDER NOTE - NSFOLLOWUPINSTRUCTIONS_ED_ALL_ED_FT
There are no signs of emergency conditions requiring admission to the hospital on today's workup.  Based on the evaluation, a presumptive diagnosis was made, however, further evaluation may be required by your primary care physician or a specialist for a more definitive diagnosis.  Therefore, please follow-up as directed or return to the Emergency Department if your symptoms change or worsen.    We recommend that you:  See your primary care physician and/or orthopedic within the next 72 hours for follow up.  Bring a copy of your discharge paperwork (including any test results) to your doctor.        *** Return immediately if you have worsening symptoms, chest pain, Shortness of Breath, abdominal pain, Nausea/Vomiting/Diarrhea, dizziness, weakness, confusion, severe headache, vision changes, urinary symptoms, syncope, falls, trauma, discharge, bleeding, fevers, or any other new/concerning symptoms. ***

## 2022-08-09 NOTE — ED ADULT TRIAGE NOTE - CHIEF COMPLAINT QUOTE
Pt. c/o generalized body  pain. Pt fell last week Tuesday. Pt has  x-ray of arm and knee last week which was negative at . Endorses hitting head. chest pain. Denies blurry vision, dizziness. Pain is worst since yesterday. Healing abrasion noted to right elbow. No PMHx.

## 2022-08-10 VITALS
RESPIRATION RATE: 17 BRPM | OXYGEN SATURATION: 100 % | SYSTOLIC BLOOD PRESSURE: 121 MMHG | DIASTOLIC BLOOD PRESSURE: 86 MMHG | HEART RATE: 85 BPM

## 2022-12-05 ENCOUNTER — APPOINTMENT (OUTPATIENT)
Dept: OBGYN | Facility: CLINIC | Age: 37
End: 2022-12-05

## 2022-12-05 VITALS
WEIGHT: 153 LBS | BODY MASS INDEX: 28.16 KG/M2 | HEIGHT: 62 IN | DIASTOLIC BLOOD PRESSURE: 77 MMHG | SYSTOLIC BLOOD PRESSURE: 117 MMHG

## 2022-12-05 DIAGNOSIS — N97.9 FEMALE INFERTILITY, UNSPECIFIED: ICD-10-CM

## 2022-12-05 DIAGNOSIS — Z00.00 ENCOUNTER FOR GENERAL ADULT MEDICAL EXAMINATION W/OUT ABNORMAL FINDINGS: ICD-10-CM

## 2022-12-05 DIAGNOSIS — Z01.419 ENCOUNTER FOR GYNECOLOGICAL EXAMINATION (GENERAL) (ROUTINE) W/OUT ABNORMAL FINDINGS: ICD-10-CM

## 2022-12-05 DIAGNOSIS — E28.2 POLYCYSTIC OVARIAN SYNDROME: ICD-10-CM

## 2022-12-05 PROCEDURE — 99214 OFFICE O/P EST MOD 30 MIN: CPT

## 2022-12-05 NOTE — PLAN
[FreeTextEntry1] : 37 yr old for routine check up \par - patient was seen and had negativer pap and HPV neg Feb 2022. patient not due for PAP until feb 2023. \par - discussed infertility that once she is trying for 6 months without success. patient has been trying since June 2022, after MAB 1/2022. patient was then on COCP for irregular bleeding which she stopped taking in april 2022. \par - referral given to patient for [facility]\par - AUB panel sent \par - f/u in Feb 2023 for PAP\par

## 2022-12-05 NOTE — HISTORY OF PRESENT ILLNESS
[N] : Patient does not use contraception [Monogamous (Male Partner)] : is monogamous with a male partner [Y] : Positive pregnancy history [Currently Active] : currently active [Men] : men [Vaginal] : vaginal [No] : No [LMPDate] : 11/12/2022 [MensesFreq] : 30 [MensesLength] : 5 [PGHxTotal] : 2 [[facility] CampusxFulerm] : 1 [PGHxPremature] : 0 [PGHxAbortions] : 1 [[facility] Centeriving] : 1 [FreeTextEntry1] : NSVDx1 - GDM, MAB

## 2022-12-09 LAB
ESTIMATED AVERAGE GLUCOSE: 123 MG/DL
HBA1C MFR BLD HPLC: 5.9 %
PROLACTIN SERPL-MCNC: 37.8 NG/ML

## 2022-12-12 ENCOUNTER — APPOINTMENT (OUTPATIENT)
Dept: OBGYN | Facility: CLINIC | Age: 37
End: 2022-12-12

## 2022-12-13 LAB
ALBUMIN SERPL ELPH-MCNC: 4.7 G/DL
ALP BLD-CCNC: 105 U/L
ALT SERPL-CCNC: 14 U/L
ANION GAP SERPL CALC-SCNC: 11 MMOL/L
AST SERPL-CCNC: 17 U/L
BASOPHILS # BLD AUTO: 0.07 K/UL
BASOPHILS NFR BLD AUTO: 0.6 %
BILIRUB SERPL-MCNC: 0.2 MG/DL
BUN SERPL-MCNC: 11 MG/DL
CALCIUM SERPL-MCNC: 9.1 MG/DL
CHLORIDE SERPL-SCNC: 103 MMOL/L
CO2 SERPL-SCNC: 23 MMOL/L
CREAT SERPL-MCNC: 0.54 MG/DL
DHEA-S SERPL-MCNC: 308 UG/DL
EGFR: 122 ML/MIN/1.73M2
EOSINOPHIL # BLD AUTO: 0.6 K/UL
EOSINOPHIL NFR BLD AUTO: 5.2 %
FSH SERPL-MCNC: 4 IU/L
GLUCOSE SERPL-MCNC: 94 MG/DL
HCT VFR BLD CALC: 39.8 %
HGB BLD-MCNC: 12 G/DL
IMM GRANULOCYTES NFR BLD AUTO: 0.2 %
LH SERPL-ACNC: 4.9 IU/L
LYMPHOCYTES # BLD AUTO: 2.94 K/UL
LYMPHOCYTES NFR BLD AUTO: 25.5 %
MAN DIFF?: NORMAL
MCHC RBC-ENTMCNC: 23.8 PG
MCHC RBC-ENTMCNC: 30.2 GM/DL
MCV RBC AUTO: 78.8 FL
MONOCYTES # BLD AUTO: 0.62 K/UL
MONOCYTES NFR BLD AUTO: 5.4 %
NEUTROPHILS # BLD AUTO: 7.3 K/UL
NEUTROPHILS NFR BLD AUTO: 63.1 %
PLATELET # BLD AUTO: 288 K/UL
POTASSIUM SERPL-SCNC: 4 MMOL/L
PROT SERPL-MCNC: 7.5 G/DL
RBC # BLD: 5.05 M/UL
RBC # FLD: 15.8 %
SODIUM SERPL-SCNC: 137 MMOL/L
T4 FREE SERPL-MCNC: 1.3 NG/DL
T4 SERPL-MCNC: 7.2 UG/DL
TESTOST SERPL-MCNC: 24.7 NG/DL
TSH SERPL-ACNC: 1.44 UIU/ML
WBC # FLD AUTO: 11.55 K/UL

## 2023-03-13 ENCOUNTER — APPOINTMENT (OUTPATIENT)
Dept: OBGYN | Facility: CLINIC | Age: 38
End: 2023-03-13

## 2023-04-13 ENCOUNTER — EMERGENCY (EMERGENCY)
Facility: HOSPITAL | Age: 38
LOS: 1 days | Discharge: HOME | End: 2023-04-13
Attending: STUDENT IN AN ORGANIZED HEALTH CARE EDUCATION/TRAINING PROGRAM | Admitting: STUDENT IN AN ORGANIZED HEALTH CARE EDUCATION/TRAINING PROGRAM
Payer: COMMERCIAL

## 2023-04-13 VITALS
OXYGEN SATURATION: 100 % | SYSTOLIC BLOOD PRESSURE: 125 MMHG | TEMPERATURE: 99 F | RESPIRATION RATE: 18 BRPM | HEART RATE: 93 BPM | DIASTOLIC BLOOD PRESSURE: 85 MMHG

## 2023-04-13 PROCEDURE — 99284 EMERGENCY DEPT VISIT MOD MDM: CPT

## 2023-04-13 PROCEDURE — 71046 X-RAY EXAM CHEST 2 VIEWS: CPT | Mod: 26

## 2023-04-13 RX ORDER — LIDOCAINE 4 G/100G
1 CREAM TOPICAL ONCE
Refills: 0 | Status: COMPLETED | OUTPATIENT
Start: 2023-04-13 | End: 2023-04-13

## 2023-04-13 RX ADMIN — LIDOCAINE 1 PATCH: 4 CREAM TOPICAL at 14:03

## 2023-04-13 NOTE — ED ADULT NURSE NOTE - OBJECTIVE STATEMENT
patient presents to ed c/o fevers x 4 days. reports taking ibuprofen with no relief. endorses neck pain, sore throat and N/V. denies cp, sob,

## 2023-04-13 NOTE — ED PROVIDER NOTE - PHYSICAL EXAMINATION
VITALS: reviewed  GEN: NAD, A & O x 4  HEAD/EYES: NCAT, EOMI, anicteric sclerae,   ENT: mucus membranes moist, b/l tonsilar erythema without exudate, trachea midline,   Neck: + posterior cervical lymphadenoapthy with ttp   RESP: lungs CTA with equal breath sounds bilaterally, chest wall nontender and atraumatic  CV: heart with reg rhythm S1, S2, distal pulses intact and symmetric bilaterally  ABDOMEN: normoactive bowel sounds, soft, nondistended, nontender, no palpable masses  : no CVAT  MSK: extremities atraumatic and nontender, no edema, no asymmetry.  SKIN: warm, dry, no rash, no bruising, no cyanosis. color appropriate for ethnicity  NEURO: alert, mentating appropriately, no facial asymmetry.   PSYCH: Affect appropriate VITALS: reviewed  GEN: NAD, A & O x 4  HEAD/EYES: NCAT, EOMI, anicteric sclerae,   ENT: mucus membranes moist, b/l tonsilar erythema without exudate, trachea midline,   Neck: + posterior cervical lymphadenoapthy with ttp, no neck stiffness  RESP: lungs CTA with equal breath sounds bilaterally, chest wall nontender and atraumatic  CV: heart with reg rhythm S1, S2, distal pulses intact and symmetric bilaterally  ABDOMEN: normoactive bowel sounds, soft, nondistended, nontender, no palpable masses  : no CVAT  MSK: extremities atraumatic and nontender, no edema, no asymmetry.  SKIN: warm, dry, no rash, no bruising, no cyanosis. color appropriate for ethnicity  NEURO: alert, mentating appropriately, no facial asymmetry.   PSYCH: Affect appropriate

## 2023-04-13 NOTE — ED PROVIDER NOTE - PATIENT PORTAL LINK FT
You can access the FollowMyHealth Patient Portal offered by [facility] by registering at the following website: [email or web address]. By joining [employer]’s FollowMyHealth portal, you will also be able to view your health information using other applications (apps) compatible with our system.

## 2023-04-13 NOTE — ED PROVIDER NOTE - CLINICAL SUMMARY MEDICAL DECISION MAKING FREE TEXT BOX
38 yo F presenting with flu like sx x 5 days with cough, fevers, sore throat and lymphadenopathy. XR without consolidation, lungs gideon, likely viral URI. will send flu/covid swab- dc with return recs 38 yo F presenting with flu like sx x 5 days with cough, fevers, sore throat and lymphadenopathy. XR without consolidation, lungs clear, likely viral URI. will send flu/covid swab. Offered tylenol/motrin for pain and fevers but patient is fasting and does not want oral meds at this time, will tx tender lymphadenopathy with lidocaine patch.

## 2023-04-13 NOTE — ED PROVIDER NOTE - OBJECTIVE STATEMENT
36 yo F no pmhx presenting with complaints of cough, fevers, chills, sore throat x 5 days. No sick contacts, travel one month ago. Denies hemoptysis. 36 yo F no pmhx presenting with complaints of cough, fevers, chills, sore throat x 5 days. No sick contacts, travel one month ago. Denies hemoptysis. No headaches. No rashes.

## 2023-07-29 ENCOUNTER — EMERGENCY (EMERGENCY)
Facility: HOSPITAL | Age: 38
LOS: 1 days | Discharge: HOME | End: 2023-07-29
Attending: EMERGENCY MEDICINE | Admitting: EMERGENCY MEDICINE
Payer: COMMERCIAL

## 2023-07-29 VITALS
HEART RATE: 79 BPM | OXYGEN SATURATION: 98 % | DIASTOLIC BLOOD PRESSURE: 77 MMHG | RESPIRATION RATE: 16 BRPM | SYSTOLIC BLOOD PRESSURE: 114 MMHG

## 2023-07-29 VITALS
SYSTOLIC BLOOD PRESSURE: 122 MMHG | TEMPERATURE: 98 F | OXYGEN SATURATION: 100 % | RESPIRATION RATE: 16 BRPM | HEART RATE: 91 BPM | DIASTOLIC BLOOD PRESSURE: 77 MMHG

## 2023-07-29 LAB
ALBUMIN SERPL ELPH-MCNC: 4.6 G/DL — SIGNIFICANT CHANGE UP (ref 3.3–5)
ALP SERPL-CCNC: 110 U/L — SIGNIFICANT CHANGE UP (ref 40–120)
ALT FLD-CCNC: 22 U/L — SIGNIFICANT CHANGE UP (ref 4–33)
ANION GAP SERPL CALC-SCNC: 17 MMOL/L — HIGH (ref 7–14)
APPEARANCE UR: CLEAR — SIGNIFICANT CHANGE UP
AST SERPL-CCNC: 18 U/L — SIGNIFICANT CHANGE UP (ref 4–32)
BASOPHILS # BLD AUTO: 0.07 K/UL — SIGNIFICANT CHANGE UP (ref 0–0.2)
BASOPHILS NFR BLD AUTO: 0.5 % — SIGNIFICANT CHANGE UP (ref 0–2)
BILIRUB SERPL-MCNC: <0.2 MG/DL — SIGNIFICANT CHANGE UP (ref 0.2–1.2)
BILIRUB UR-MCNC: NEGATIVE — SIGNIFICANT CHANGE UP
BLD GP AB SCN SERPL QL: NEGATIVE — SIGNIFICANT CHANGE UP
BUN SERPL-MCNC: 12 MG/DL — SIGNIFICANT CHANGE UP (ref 7–23)
CALCIUM SERPL-MCNC: 9.6 MG/DL — SIGNIFICANT CHANGE UP (ref 8.4–10.5)
CHLORIDE SERPL-SCNC: 99 MMOL/L — SIGNIFICANT CHANGE UP (ref 98–107)
CO2 SERPL-SCNC: 22 MMOL/L — SIGNIFICANT CHANGE UP (ref 22–31)
COLOR SPEC: YELLOW — SIGNIFICANT CHANGE UP
CREAT SERPL-MCNC: 0.58 MG/DL — SIGNIFICANT CHANGE UP (ref 0.5–1.3)
DIFF PNL FLD: NEGATIVE — SIGNIFICANT CHANGE UP
EGFR: 120 ML/MIN/1.73M2 — SIGNIFICANT CHANGE UP
EOSINOPHIL # BLD AUTO: 0.49 K/UL — SIGNIFICANT CHANGE UP (ref 0–0.5)
EOSINOPHIL NFR BLD AUTO: 3.8 % — SIGNIFICANT CHANGE UP (ref 0–6)
GLUCOSE SERPL-MCNC: 115 MG/DL — HIGH (ref 70–99)
GLUCOSE UR QL: NEGATIVE MG/DL — SIGNIFICANT CHANGE UP
HCG SERPL-ACNC: 6248 MIU/ML — SIGNIFICANT CHANGE UP
HCT VFR BLD CALC: 40 % — SIGNIFICANT CHANGE UP (ref 34.5–45)
HGB BLD-MCNC: 12.2 G/DL — SIGNIFICANT CHANGE UP (ref 11.5–15.5)
IANC: 8.88 K/UL — HIGH (ref 1.8–7.4)
IMM GRANULOCYTES NFR BLD AUTO: 1 % — HIGH (ref 0–0.9)
KETONES UR-MCNC: NEGATIVE MG/DL — SIGNIFICANT CHANGE UP
LEUKOCYTE ESTERASE UR-ACNC: NEGATIVE — SIGNIFICANT CHANGE UP
LYMPHOCYTES # BLD AUTO: 2.73 K/UL — SIGNIFICANT CHANGE UP (ref 1–3.3)
LYMPHOCYTES # BLD AUTO: 21.2 % — SIGNIFICANT CHANGE UP (ref 13–44)
MCHC RBC-ENTMCNC: 23.4 PG — LOW (ref 27–34)
MCHC RBC-ENTMCNC: 30.5 GM/DL — LOW (ref 32–36)
MCV RBC AUTO: 76.8 FL — LOW (ref 80–100)
MONOCYTES # BLD AUTO: 0.59 K/UL — SIGNIFICANT CHANGE UP (ref 0–0.9)
MONOCYTES NFR BLD AUTO: 4.6 % — SIGNIFICANT CHANGE UP (ref 2–14)
NEUTROPHILS # BLD AUTO: 8.88 K/UL — HIGH (ref 1.8–7.4)
NEUTROPHILS NFR BLD AUTO: 68.9 % — SIGNIFICANT CHANGE UP (ref 43–77)
NITRITE UR-MCNC: NEGATIVE — SIGNIFICANT CHANGE UP
NRBC # BLD: 0 /100 WBCS — SIGNIFICANT CHANGE UP (ref 0–0)
NRBC # FLD: 0 K/UL — SIGNIFICANT CHANGE UP (ref 0–0)
PH UR: 6 — SIGNIFICANT CHANGE UP (ref 5–8)
PLATELET # BLD AUTO: 313 K/UL — SIGNIFICANT CHANGE UP (ref 150–400)
POTASSIUM SERPL-MCNC: 4.3 MMOL/L — SIGNIFICANT CHANGE UP (ref 3.5–5.3)
POTASSIUM SERPL-SCNC: 4.3 MMOL/L — SIGNIFICANT CHANGE UP (ref 3.5–5.3)
PROT SERPL-MCNC: 8.1 G/DL — SIGNIFICANT CHANGE UP (ref 6–8.3)
PROT UR-MCNC: NEGATIVE MG/DL — SIGNIFICANT CHANGE UP
RBC # BLD: 5.21 M/UL — HIGH (ref 3.8–5.2)
RBC # FLD: 14.8 % — HIGH (ref 10.3–14.5)
RH IG SCN BLD-IMP: POSITIVE — SIGNIFICANT CHANGE UP
SODIUM SERPL-SCNC: 138 MMOL/L — SIGNIFICANT CHANGE UP (ref 135–145)
SP GR SPEC: 1.01 — SIGNIFICANT CHANGE UP (ref 1–1.03)
UROBILINOGEN FLD QL: 0.2 MG/DL — SIGNIFICANT CHANGE UP (ref 0.2–1)
WBC # BLD: 12.89 K/UL — HIGH (ref 3.8–10.5)
WBC # FLD AUTO: 12.89 K/UL — HIGH (ref 3.8–10.5)

## 2023-07-29 PROCEDURE — 76817 TRANSVAGINAL US OBSTETRIC: CPT | Mod: 26

## 2023-07-29 PROCEDURE — 99284 EMERGENCY DEPT VISIT MOD MDM: CPT

## 2023-07-29 NOTE — ED PROVIDER NOTE - NSFOLLOWUPINSTRUCTIONS_ED_ALL_ED_FT
You were seen in the ED today for vaginal bleeding during pregnancy. We did some blood work and tested your urine. The results were unremarkable. We also did a transvaginal ultrasound, which revealed a subchorionic hematoma, which is most likely the cause of your spotting. Please refer to the information sheet that was given to you regarding subchorionic hematomas.    Please return to the ED for any worsening symptoms, including lightheadedness, weakness, increased bleeding, severe cramps in your abdomen. Please follow up with your OB as discussed.

## 2023-07-29 NOTE — ED ADULT TRIAGE NOTE - NS ED NURSE DIRECT TO ROOM YN
Patient is scheduled for NST on 06/06/23 and states took all prescribed medications on 06/05/23  Protonix  Diazepam  Celexa  Metoprolol  Zanaflex  Zetia  Lipitor    Patient is asking if any of these medications will affect the NST for 06/6/23      Please advise  [phone]   No

## 2023-07-29 NOTE — ED PROVIDER NOTE - CLINICAL SUMMARY MEDICAL DECISION MAKING FREE TEXT BOX
Ms. Nelson is a 38 y/o female , with no significant PMHx, presenting to the ED today for vaginal bleeding. Currently 6 weeks pregnant. LMP on .    Will obtain basic labs to assess for anemia. Hcg, type + screen. Transvaginal ultrasound to assess pregnancy status.    Will discharge home with OB follow up if labs unremarkable. Ms. Nelson is a 38 y/o female , with no significant PMHx, presenting to the ED today for vaginal bleeding. Currently 6 weeks pregnant. LMP on .    Will obtain basic labs to assess for anemia. UA, Hcg, type + screen. Transvaginal ultrasound to assess pregnancy status.    Will discharge home with OB follow up if labs unremarkable.

## 2023-07-29 NOTE — ED ADULT NURSE NOTE - NSFALLUNIVINTERV_ED_ALL_ED
Bed/Stretcher in lowest position, wheels locked, appropriate side rails in place/Call bell, personal items and telephone in reach/Instruct patient to call for assistance before getting out of bed/chair/stretcher/Non-slip footwear applied when patient is off stretcher/[place] to call system/Physically safe environment - no spills, clutter or unnecessary equipment/Purposeful proactive rounding/Room/bathroom lighting operational, light cord in reach

## 2023-07-29 NOTE — ED PROVIDER NOTE - ATTENDING CONTRIBUTION TO CARE
38 y/o female , with no significant PMHx, presenting to the ED today for vaginal bleeding. Patient is currently 6 weeks pregnant. LMP on . Bleeding described as light spotting. Also experiencing some abdominal pain that started last night. Feels like her period cramps. She has not taken anything for the pain. Patient had a miscarriage last year. +N/V, white vaginal discharge, otherwise, no other symptoms including fever, lightheadedness, weakness, SOB, CP, palpitations, numbness/tingling.

## 2023-07-29 NOTE — ED PROVIDER NOTE - CARE PLAN
1 Principal Discharge DX:	Vaginal bleeding during pregnancy  Secondary Diagnosis:	Subchorionic hematoma in first trimester

## 2023-07-29 NOTE — ED ADULT NURSE NOTE - DISCHARGE DATE/TIME
Pt left a message on my chart would like thhe following labs put in and called when they are in there.  CMB with Mag and testosterone  and a triglycerides and Cholesterol
29-Jul-2023 23:25

## 2023-07-29 NOTE — ED PROVIDER NOTE - NS ED MD DISPO DISCHARGE
Home Topical Clindamycin Counseling: Patient counseled that this medication may cause skin irritation or allergic reactions.  In the event of skin irritation, the patient was advised to reduce the amount of the drug applied or use it less frequently.   The patient verbalized understanding of the proper use and possible adverse effects of clindamycin.  All of the patient's questions and concerns were addressed.

## 2023-07-29 NOTE — ED PROVIDER NOTE - PROGRESS NOTE DETAILS
Spoke to patient about results of ultrasound. She verbalized understanding. Return precautions given. Patient to follow up with OB. Has appointment scheduled on August 8th.  - Leydricah [place], PGY1

## 2023-07-29 NOTE — ED PROVIDER NOTE - NSFOLLOWUPCLINICS_GEN_ALL_ED_FT
[facility]  OB/GYN & Surg  [phone] [place] [address]  Phone: [phone]  Fax:

## 2023-07-29 NOTE — ED PROVIDER NOTE - OBJECTIVE STATEMENT
Ms. Nelson is a 38 y/o female , with no significant PMHx, presenting to the ED today for vaginal bleeding. Patient is currently 6 weeks pregnant. LMP on . Bleeding described as light spotting. Also experiencing some abdominal pain that started last night. Feels like her period cramps. She has not taken anything for the pain. Patient had a miscarriage last year. +N/V, white vaginal discharge, otherwise, no other symptoms including fever, lightheadedness, weakness, SOB, CP, palpitations, numbness/tingling.

## 2023-07-29 NOTE — ED ADULT NURSE NOTE - OBJECTIVE STATEMENT
Patient is A&OX4, awake, alert, 6 weeks pregnant. Pt coming to ED from home regarding vaginal bleeding. Pt states today in the morning she noticed bright red blood. Pt endorses lower abd pain that is sharp and intermitted. Pt denies any fatigue, weakness, chills or fevers. h/o migraine. meds given as ordered, labs drawn and sent, will continue to monitor.

## 2023-07-29 NOTE — ED PROVIDER NOTE - PATIENT PORTAL LINK FT
You can access the FollowMyHealth Patient Portal offered by [facility] by registering at the following website: [email or web address]. By joining [employer]’s FollowMyHealth portal, you will also be able to view your health information using other applications (apps) compatible with our system.

## 2023-08-04 LAB — HCG SERPL-MCNC: ABNORMAL MIU/ML

## 2023-08-15 ENCOUNTER — APPOINTMENT (OUTPATIENT)
Dept: ANTEPARTUM | Facility: CLINIC | Age: 38
End: 2023-08-15
Payer: COMMERCIAL

## 2023-08-15 ENCOUNTER — APPOINTMENT (OUTPATIENT)
Dept: OBGYN | Facility: CLINIC | Age: 38
End: 2023-08-15
Payer: COMMERCIAL

## 2023-08-15 VITALS
BODY MASS INDEX: 30 KG/M2 | DIASTOLIC BLOOD PRESSURE: 75 MMHG | SYSTOLIC BLOOD PRESSURE: 119 MMHG | WEIGHT: 163 LBS | HEIGHT: 62 IN

## 2023-08-15 DIAGNOSIS — O02.1 MISSED ABORTION: ICD-10-CM

## 2023-08-15 PROCEDURE — 76817 TRANSVAGINAL US OBSTETRIC: CPT

## 2023-08-15 PROCEDURE — 76815 OB US LIMITED FETUS(S): CPT

## 2023-08-15 PROCEDURE — 99214 OFFICE O/P EST MOD 30 MIN: CPT | Mod: 25
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
## 2023-08-15 NOTE — HISTORY OF PRESENT ILLNESS
[FreeTextEntry1] : Patient is a 36 y/o LMP 06/16/2023. She had a positive home pregnancy test.  Patient had and episode of vaginal bleeding, and went to ER 07/29/2023. Patient was told she had a viable IUP at the time. Patient reports occasional spotting since then, no active bleeding. Patient has h/o of PCOS and had another MAB within the year. Transvaginal sonogram done today, reveals MAB, no FH, CRL measuring 5weeks, see official sonogram report for further information.   [Yes] : pregnancy [TextBox_6] : 06/16/2023

## 2023-08-18 LAB
ABO + RH PNL BLD: NORMAL
BLD GP AB SCN SERPL QL: NORMAL
ESTIMATED AVERAGE GLUCOSE: 114 MG/DL
HBA1C MFR BLD HPLC: 5.6 %
HCG SERPL-MCNC: ABNORMAL MIU/ML

## 2023-08-21 ENCOUNTER — EMERGENCY (EMERGENCY)
Facility: HOSPITAL | Age: 38
LOS: 1 days | Discharge: HOME | End: 2023-08-21
Attending: EMERGENCY MEDICINE | Admitting: STUDENT IN AN ORGANIZED HEALTH CARE EDUCATION/TRAINING PROGRAM
Payer: COMMERCIAL

## 2023-08-21 VITALS
SYSTOLIC BLOOD PRESSURE: 113 MMHG | HEART RATE: 86 BPM | RESPIRATION RATE: 20 BRPM | TEMPERATURE: 99 F | OXYGEN SATURATION: 98 % | DIASTOLIC BLOOD PRESSURE: 74 MMHG

## 2023-08-21 VITALS
DIASTOLIC BLOOD PRESSURE: 73 MMHG | RESPIRATION RATE: 16 BRPM | HEART RATE: 78 BPM | SYSTOLIC BLOOD PRESSURE: 114 MMHG | OXYGEN SATURATION: 100 % | TEMPERATURE: 98 F

## 2023-08-21 LAB
ALBUMIN SERPL ELPH-MCNC: 4.3 G/DL — SIGNIFICANT CHANGE UP (ref 3.3–5)
ALP SERPL-CCNC: 104 U/L — SIGNIFICANT CHANGE UP (ref 40–120)
ALT FLD-CCNC: 17 U/L — SIGNIFICANT CHANGE UP (ref 4–33)
ANION GAP SERPL CALC-SCNC: 13 MMOL/L — SIGNIFICANT CHANGE UP (ref 7–14)
APPEARANCE UR: CLEAR — SIGNIFICANT CHANGE UP
AST SERPL-CCNC: 13 U/L — SIGNIFICANT CHANGE UP (ref 4–32)
BASOPHILS # BLD AUTO: 0.04 K/UL — SIGNIFICANT CHANGE UP (ref 0–0.2)
BASOPHILS NFR BLD AUTO: 0.3 % — SIGNIFICANT CHANGE UP (ref 0–2)
BILIRUB SERPL-MCNC: 0.2 MG/DL — SIGNIFICANT CHANGE UP (ref 0.2–1.2)
BILIRUB UR-MCNC: NEGATIVE — SIGNIFICANT CHANGE UP
BLD GP AB SCN SERPL QL: NEGATIVE — SIGNIFICANT CHANGE UP
BUN SERPL-MCNC: 8 MG/DL — SIGNIFICANT CHANGE UP (ref 7–23)
CALCIUM SERPL-MCNC: 9.3 MG/DL — SIGNIFICANT CHANGE UP (ref 8.4–10.5)
CHLORIDE SERPL-SCNC: 101 MMOL/L — SIGNIFICANT CHANGE UP (ref 98–107)
CO2 SERPL-SCNC: 23 MMOL/L — SIGNIFICANT CHANGE UP (ref 22–31)
COLOR SPEC: YELLOW — SIGNIFICANT CHANGE UP
CREAT SERPL-MCNC: 0.46 MG/DL — LOW (ref 0.5–1.3)
DIFF PNL FLD: NEGATIVE — SIGNIFICANT CHANGE UP
EGFR: 127 ML/MIN/1.73M2 — SIGNIFICANT CHANGE UP
EOSINOPHIL # BLD AUTO: 0.38 K/UL — SIGNIFICANT CHANGE UP (ref 0–0.5)
EOSINOPHIL NFR BLD AUTO: 3.1 % — SIGNIFICANT CHANGE UP (ref 0–6)
GLUCOSE SERPL-MCNC: 108 MG/DL — HIGH (ref 70–99)
GLUCOSE UR QL: NEGATIVE MG/DL — SIGNIFICANT CHANGE UP
HCG SERPL-ACNC: SIGNIFICANT CHANGE UP MIU/ML
HCT VFR BLD CALC: 38.7 % — SIGNIFICANT CHANGE UP (ref 34.5–45)
HGB BLD-MCNC: 11.9 G/DL — SIGNIFICANT CHANGE UP (ref 11.5–15.5)
IANC: 8.46 K/UL — HIGH (ref 1.8–7.4)
IMM GRANULOCYTES NFR BLD AUTO: 0.6 % — SIGNIFICANT CHANGE UP (ref 0–0.9)
KETONES UR-MCNC: NEGATIVE MG/DL — SIGNIFICANT CHANGE UP
LEUKOCYTE ESTERASE UR-ACNC: NEGATIVE — SIGNIFICANT CHANGE UP
LYMPHOCYTES # BLD AUTO: 2.81 K/UL — SIGNIFICANT CHANGE UP (ref 1–3.3)
LYMPHOCYTES # BLD AUTO: 22.8 % — SIGNIFICANT CHANGE UP (ref 13–44)
MCHC RBC-ENTMCNC: 23.3 PG — LOW (ref 27–34)
MCHC RBC-ENTMCNC: 30.7 GM/DL — LOW (ref 32–36)
MCV RBC AUTO: 75.7 FL — LOW (ref 80–100)
MONOCYTES # BLD AUTO: 0.57 K/UL — SIGNIFICANT CHANGE UP (ref 0–0.9)
MONOCYTES NFR BLD AUTO: 4.6 % — SIGNIFICANT CHANGE UP (ref 2–14)
NEUTROPHILS # BLD AUTO: 8.46 K/UL — HIGH (ref 1.8–7.4)
NEUTROPHILS NFR BLD AUTO: 68.6 % — SIGNIFICANT CHANGE UP (ref 43–77)
NITRITE UR-MCNC: NEGATIVE — SIGNIFICANT CHANGE UP
NRBC # BLD: 0 /100 WBCS — SIGNIFICANT CHANGE UP (ref 0–0)
NRBC # FLD: 0 K/UL — SIGNIFICANT CHANGE UP (ref 0–0)
PH UR: 6.5 — SIGNIFICANT CHANGE UP (ref 5–8)
PLATELET # BLD AUTO: 324 K/UL — SIGNIFICANT CHANGE UP (ref 150–400)
POTASSIUM SERPL-MCNC: 3.8 MMOL/L — SIGNIFICANT CHANGE UP (ref 3.5–5.3)
POTASSIUM SERPL-SCNC: 3.8 MMOL/L — SIGNIFICANT CHANGE UP (ref 3.5–5.3)
PROT SERPL-MCNC: 7.7 G/DL — SIGNIFICANT CHANGE UP (ref 6–8.3)
PROT UR-MCNC: NEGATIVE MG/DL — SIGNIFICANT CHANGE UP
RBC # BLD: 5.11 M/UL — SIGNIFICANT CHANGE UP (ref 3.8–5.2)
RBC # FLD: 14.2 % — SIGNIFICANT CHANGE UP (ref 10.3–14.5)
RBC CASTS # UR COMP ASSIST: 0 /HPF — SIGNIFICANT CHANGE UP (ref 0–4)
RH IG SCN BLD-IMP: POSITIVE — SIGNIFICANT CHANGE UP
SODIUM SERPL-SCNC: 137 MMOL/L — SIGNIFICANT CHANGE UP (ref 135–145)
SP GR SPEC: 1.01 — SIGNIFICANT CHANGE UP (ref 1–1.03)
UROBILINOGEN FLD QL: 0.2 MG/DL — SIGNIFICANT CHANGE UP (ref 0.2–1)
WBC # BLD: 12.33 K/UL — HIGH (ref 3.8–10.5)
WBC # FLD AUTO: 12.33 K/UL — HIGH (ref 3.8–10.5)
WBC UR QL: 1 /HPF — SIGNIFICANT CHANGE UP (ref 0–5)

## 2023-08-21 PROCEDURE — 76830 TRANSVAGINAL US NON-OB: CPT | Mod: 26

## 2023-08-21 PROCEDURE — 99285 EMERGENCY DEPT VISIT HI MDM: CPT

## 2023-08-21 RX ORDER — ACETAMINOPHEN 500 MG
650 TABLET ORAL ONCE
Refills: 0 | Status: COMPLETED | OUTPATIENT
Start: 2023-08-21 | End: 2023-08-21

## 2023-08-21 RX ADMIN — Medication 650 MILLIGRAM(S): at 13:47

## 2023-08-21 NOTE — CONSULT NOTE ADULT - ASSESSMENT
36yo   9w3d presents for abdominal cramping, TVUS with diagnosis of a failed pregnancy with some concern for left cornual ectopic pregnancy measuring 1.7cm. VSS and patient currently w/out complaints. [facility] [id number]. Offered medical vs surgical options of management, per discussion with inhouse [facility] provider Dr Armstrong, given patient is hemodynamically. Patient opting for medical management with multidose MTX management with alternating Leucovorin dosing. No contraindication to MTX at this time as CMC/BMP wnl. BT A positive, no need for Rhogam.    - Follow-up with Dr Medina's office for first dose of MTX tomorrow with discussion with Dr Medina for multiple dose regiment.  - Reviewed emergency return precautions for ectopic pregnancy with excellent teach back.  - No further GYN intervention at this time. Call [id number] to page [phone] for further questions.    Amyeo Afroz Jereen, PGY-3  d/w Dr Medina

## 2023-08-21 NOTE — ED PROVIDER NOTE - PATIENT PORTAL LINK FT
You can access the FollowMyHealth Patient Portal offered by [facility] by registering at the following website: [email or web address]. By joining [employer]’s FollowMyHealth portal, you will also be able to view your health information using other applications (apps) compatible with our system.

## 2023-08-21 NOTE — ED PROVIDER NOTE - PROGRESS NOTE DETAILS
concern for retained miscarriage vs ectopic in the interstitium. gyn consulted and will see pt gyn reccs outpt f/u for outpt methotrexate.  Patient was re-evaluated and doing well. Results, including any incidental findings, were discussed. Return precautions and follow up were discussed. Patient verbalized understanding.

## 2023-08-21 NOTE — ED PROVIDER NOTE - OBJECTIVE STATEMENT
38 yo F  LMP , presenting for evaluation of lower abdominal pain in setting of known UTI, on Keflex. Seen one week ago at [facility] for similar symptoms, denies worsening sx or fevers/chills, no vaginal bleeding or abnormal vaginal discharge. Pt states she was told baby does not have a heartbeat.

## 2023-08-21 NOTE — ED PROVIDER NOTE - PHYSICAL EXAMINATION
VITALS: reviewed  GEN: NAD, A & O x 4  HEAD/EYES: NCAT, EOMI, anicteric sclerae,   ENT: mucus membranes moist, oropharynx WNL, trachea midline,  RESP: lungs CTA with equal breath sounds bilaterally, chest wall nontender and atraumatic  CV: heart with reg rhythm S1, S2, distal pulses intact and symmetric bilaterally  ABDOMEN: normoactive bowel sounds, soft, nondistended, suprapubic ttp, no palpable masses  : no CVAT  MSK: extremities atraumatic and nontender, no edema, no asymmetry.  SKIN: warm, dry, no rash, no bruising, no cyanosis. color appropriate for ethnicity  NEURO: alert, mentating appropriately, no facial asymmetry.   PSYCH: Affect appropriate

## 2023-08-21 NOTE — ED PROVIDER NOTE - NSFOLLOWUPINSTRUCTIONS_ED_ALL_ED_FT
You were seen in the ED for abdominal pain.    Your ultrasound was concerning for a ectopic pregnancy.  For this, you need to follow-up with your OB/GYN doctor tomorrow morning.  Please call tomorrow morning when the office opens to make an appointment and arrange going into the office as you will need methotrexate for your ectopic pregnancy.    In the meantime, for pain please take ibuprofen and Tylenol as per the over-the-counter bottles.    Return to the ED for any new or worrisome symptoms including increasing pain, increased vaginal bleeding (soaking through more than 2 pads per hour), fevers, vomiting, or any other symptoms that worry you.

## 2023-08-21 NOTE — CONSULT NOTE ADULT - SUBJECTIVE AND OBJECTIVE BOX
INCOMPLETE NOTE    FRITZ ALDANA  37y  Female [id number]    HPI:        Name of GYN Physician:     POB:      Pgyn: Denies fibroids, cysts, endometriosis, STI's, Abnormal pap smears     Home meds:     Hospital Meds:   MEDICATIONS  (STANDING):    MEDICATIONS  (PRN):      Allergies    No Known Allergies    Intolerances        PAST MEDICAL & SURGICAL HISTORY:  No pertinent past medical history      Migraine      No significant past surgical history          Social History:  Denies smoking use, drug use, alcohol use.   +occasional social alcohol use    Vital Signs Last 24 Hrs  T(C): 37.1 (21 Aug 2023 12:19), Max: 37.1 (21 Aug 2023 12:19)  T(F): 98.8 (21 Aug 2023 12:19), Max: 98.8 (21 Aug 2023 12:19)  HR: 86 (21 Aug 2023 12:19) (86 - 86)  BP: 113/74 (21 Aug 2023 12:19) (113/74 - 113/74)  BP(mean): --  RR: 20 (21 Aug 2023 12:19) (20 - 20)  SpO2: 98% (21 Aug 2023 12:19) (98% - 98%)    Parameters below as of 21 Aug 2023 12:19  Patient On (Oxygen Delivery Method): room air        Physical Exam:   General: sitting comftorably in bed, NAD   HEENT: neck supple, full ROM  CV: RR S1S2 no m/r/g  Lungs: CTA b/l, good air flow b/l   Back: No CVA tenderness  Abd: Soft, non-tender, non-distended.  Bowel sounds present.    :  No bleeding on pad.    External labia wnl.  Bimanual exam with no cervical motion tenderness, uterus wnl, adnexa non palpable b/l.  Cervix closed vs. Cervix dilated    cm   Speculum Exam: No active bleeding from os.  Physiologic discharge.    Ext: non-tender b/l, no edema     LABS:                              11.9   12.33 )-----------( 324      ( 21 Aug 2023 13:30 )             38.7     -    137  |  101  |  8   ----------------------------<  108<H>  3.8   |  23  |  0.46<L>    Ca    9.3      21 Aug 2023 13:30    TPro  7.7  /  Alb  4.3  /  TBili  0.2  /  DBili  x   /  AST  13  /  ALT  17  /  AlkPhos  104  08-21    I&O's Detail      Urinalysis Basic - ( 21 Aug 2023 13:30 )    Color: Yellow / Appearance: Clear / S.007 / pH: x  Gluc: 108 mg/dL / Ketone: Negative mg/dL  / Bili: Negative / Urobili: 0.2 mg/dL   Blood: x / Protein: Negative mg/dL / Nitrite: Negative   Leuk Esterase: Negative / RBC: 0 /HPF / WBC 1 /HPF   Sq Epi: x / Non Sq Epi: x / Bacteria: x        RADIOLOGY & ADDITIONAL STUDIES:      Melanie Pan, PGY1 FRITZ ALDANA  37y  Female [id number]    HPI: 36yo  LMP  9w3d presents for evaluation of abdominal pain last night, who presented to Dr Medina this AM for evaluation and then presented to the ED for furhter workup of her pregnancy. She initially presented to [facility] ED on on  for initial evaluation, when IUP was confirmed with a 5w4d pregnancy by ultrasound. Currently she is w/out complaints.    Of note, patient had presented to [facility] with abdominal pain and was told her pregnancy is too early, likely due to no records at their institution. She was also diagnosed with a UTI and was discharge on Keflex ( - ).       Name of GYN Physician: Dr Medina    OBHx: 2016 FT , SAB x1 6wk D&C requiring transfusion  GYNHx: denies fibroids, cysts, abnormal paps, STDs  PMH: denies  PSH: denies  Meds: Keflex  All: NKDA  Soc: no substance use, anxiety/depression        Vital Signs Last 24 Hrs  T(C): 37.1 (21 Aug 2023 12:19), Max: 37.1 (21 Aug 2023 12:19)  T(F): 98.8 (21 Aug 2023 12:19), Max: 98.8 (21 Aug 2023 12:19)  HR: 86 (21 Aug 2023 12:19) (86 - 86)  BP: 113/74 (21 Aug 2023 12:19) (113/74 - 113/74)  BP(mean): --  RR: 20 (21 Aug 2023 12:19) (20 - 20)  SpO2: 98% (21 Aug 2023 12:19) (98% - 98%)    Parameters below as of 21 Aug 2023 12:19  Patient On (Oxygen Delivery Method): room air        Physical Exam:   General: sitting comfortably in bed, NAD   HEENT: neck supple, full ROM  CV: RR S1S2 no m/r/g  Lungs: CTA b/l, good air flow b/l   Back: No CVA tenderness  Abd: Soft, non-tender, non-distended.  Bowel sounds present.    :  No bleeding on pad.    External labia wnl.  Bimanual exam with no cervical motion tenderness, uterus wnl, adnexa non palpable b/l.  Cervix closed.  Speculum Exam: No active bleeding from os.  Physiologic discharge.    Ext: non-tender b/l, no edema     LABS:                              11.9   12.33 )-----------( 324      ( 21 Aug 2023 13:30 )             38.7         137  |  101  |  8   ----------------------------<  108<H>  3.8   |  23  |  0.46<L>    Ca    9.3      21 Aug 2023 13:30    TPro  7.7  /  Alb  4.3  /  TBili  0.2  /  DBili  x   /  AST  13  /  ALT  17  /  AlkPhos  104      I&O's Detail      Urinalysis Basic - ( 21 Aug 2023 13:30 )    Color: Yellow / Appearance: Clear / S.007 / pH: x  Gluc: 108 mg/dL / Ketone: Negative mg/dL  / Bili: Negative / Urobili: 0.2 mg/dL   Blood: x / Protein: Negative mg/dL / Nitrite: Negative   Leuk Esterase: Negative / RBC: 0 /HPF / WBC 1 /HPF   Sq Epi: x / Non Sq Epi: x / Bacteria: x        RADIOLOGY & ADDITIONAL STUDIES:  < from: US Transvaginal (23 @ 14:58) >  LMP: 2023.    Estimated Gestational Age by LMP: 9 weeks 3 days    Estimated Gestational Age by prior ultrasound dated 2023: 8 weeks 6   days    COMPARISON: Pelvic ultrasound 2023    Endovaginal and transabdominal pelvic sonogram. Color and Spectral   Doppler was performed.    FINDINGS:  Uterus: A single intrauterine gestation is identified, which is in a high   left lateral location in the region of the cornu with associated thinned   overlying myometrium measuring at a minimum of 3 mm.    Gestational Sac Size (mean): 1.7 cm  Gestational Sac Shape: Somewhat rounded with lobulated margin  Crown Rump Length: The previously identified fetal pole is no longer   visualized.  Estimated Gestational Age: 6 weeks 4 days by mean gestational sac size.  Yolk Sac: A thin-walled cystic structure within the gestational sac   measuring 7 mm in diameter may represent the yolk sac or amnion.  Fetal Heart Rate: Not applicable.    Right ovary: 2.2 cm x 1.2 cm x 0.9 cm. Within normal limits.  Left ovary: 2.7 cm x 0.9 cm x 2.8 cm. Within normal limits. Left corpus   luteum measuring 1.3 x 0.9 x 1.4 cm    Fluid: None.    IMPRESSION:  The previously seen fetal pole with cardiac activity on the ultrasound of   2023 is no longer present, compatible with fetal demise and failed   pregnancy. The gestation is located in a high left lateral location in   the region of the left cornu with associated overlying myometrial   thinning, raising concern for an interstitial ectopic pregnancy, with   angular pregnancy also considered but thought to be less likely.      Findings were discussed with Dr. BAIN 2023 3:35 PM by Dr. Basurto.    --- End of Report ---      < end of copied text >

## 2023-08-21 NOTE — ED ADULT NURSE NOTE - OBJECTIVE STATEMENT
Pt. A&OX4, states she's approx. 9 wks pregnant () c/o lower abdominal pain intermittently since last week. States she was seen here and her OB since it started. Unable to hear fetal HR and pt. is concerned. Denies vaginal bleeding, abnormal discharge or back pain. LMP . #20g IV placed to right AC, labs sent. MD at bedside for eval. Vitals stable, breathing well on RA. Will continue to monitor.

## 2023-08-21 NOTE — ED ADULT NURSE NOTE - NSFALLUNIVINTERV_ED_ALL_ED
Bed/Stretcher in lowest position, wheels locked, appropriate side rails in place/Call bell, personal items and telephone in reach/Instruct patient to call for assistance before getting out of bed/chair/stretcher/Non-slip footwear applied when patient is off stretcher/[place] to call system/Physically safe environment - no spills, clutter or unnecessary equipment/Purposeful proactive rounding/Room/bathroom lighting operational, light cord in reach

## 2023-08-21 NOTE — ED PROVIDER NOTE - CLINICAL SUMMARY MEDICAL DECISION MAKING FREE TEXT BOX
38 yo F presenting for evaluation of suprapubic pain and urinary sx in setting of known uti. Consider incompletely treated UTI. PT also with ? fetal demise, plan for repeat US today, likely gyn c/s. No fevers/chills, vss, low suspicion for septic .

## 2023-08-22 ENCOUNTER — NON-APPOINTMENT (OUTPATIENT)
Age: 38
End: 2023-08-22

## 2023-08-22 LAB
CULTURE RESULTS: NO GROWTH — SIGNIFICANT CHANGE UP
SPECIMEN SOURCE: SIGNIFICANT CHANGE UP

## 2023-08-23 ENCOUNTER — EMERGENCY (EMERGENCY)
Facility: HOSPITAL | Age: 38
LOS: 1 days | Discharge: HOME | End: 2023-08-23
Attending: STUDENT IN AN ORGANIZED HEALTH CARE EDUCATION/TRAINING PROGRAM | Admitting: STUDENT IN AN ORGANIZED HEALTH CARE EDUCATION/TRAINING PROGRAM
Payer: COMMERCIAL

## 2023-08-23 VITALS
HEART RATE: 88 BPM | OXYGEN SATURATION: 99 % | TEMPERATURE: 98 F | DIASTOLIC BLOOD PRESSURE: 71 MMHG | SYSTOLIC BLOOD PRESSURE: 109 MMHG | RESPIRATION RATE: 16 BRPM

## 2023-08-23 VITALS
OXYGEN SATURATION: 100 % | HEART RATE: 70 BPM | TEMPERATURE: 98 F | RESPIRATION RATE: 17 BRPM | SYSTOLIC BLOOD PRESSURE: 114 MMHG | DIASTOLIC BLOOD PRESSURE: 76 MMHG

## 2023-08-23 LAB
ALBUMIN SERPL ELPH-MCNC: 4.2 G/DL — SIGNIFICANT CHANGE UP (ref 3.3–5)
ALP SERPL-CCNC: 99 U/L — SIGNIFICANT CHANGE UP (ref 40–120)
ALT FLD-CCNC: 16 U/L — SIGNIFICANT CHANGE UP (ref 4–33)
ANION GAP SERPL CALC-SCNC: 10 MMOL/L — SIGNIFICANT CHANGE UP (ref 7–14)
AST SERPL-CCNC: 13 U/L — SIGNIFICANT CHANGE UP (ref 4–32)
BASOPHILS # BLD AUTO: 0.04 K/UL — SIGNIFICANT CHANGE UP (ref 0–0.2)
BASOPHILS NFR BLD AUTO: 0.4 % — SIGNIFICANT CHANGE UP (ref 0–2)
BILIRUB SERPL-MCNC: 0.2 MG/DL — SIGNIFICANT CHANGE UP (ref 0.2–1.2)
BLD GP AB SCN SERPL QL: NEGATIVE — SIGNIFICANT CHANGE UP
BUN SERPL-MCNC: 8 MG/DL — SIGNIFICANT CHANGE UP (ref 7–23)
CALCIUM SERPL-MCNC: 9 MG/DL — SIGNIFICANT CHANGE UP (ref 8.4–10.5)
CHLORIDE SERPL-SCNC: 104 MMOL/L — SIGNIFICANT CHANGE UP (ref 98–107)
CO2 SERPL-SCNC: 21 MMOL/L — LOW (ref 22–31)
CREAT SERPL-MCNC: 0.51 MG/DL — SIGNIFICANT CHANGE UP (ref 0.5–1.3)
EGFR: 124 ML/MIN/1.73M2 — SIGNIFICANT CHANGE UP
EOSINOPHIL # BLD AUTO: 0.42 K/UL — SIGNIFICANT CHANGE UP (ref 0–0.5)
EOSINOPHIL NFR BLD AUTO: 4.1 % — SIGNIFICANT CHANGE UP (ref 0–6)
GLUCOSE SERPL-MCNC: 110 MG/DL — HIGH (ref 70–99)
HCG SERPL-ACNC: SIGNIFICANT CHANGE UP MIU/ML
HCT VFR BLD CALC: 37.3 % — SIGNIFICANT CHANGE UP (ref 34.5–45)
HGB BLD-MCNC: 11.4 G/DL — LOW (ref 11.5–15.5)
IANC: 7 K/UL — SIGNIFICANT CHANGE UP (ref 1.8–7.4)
IMM GRANULOCYTES NFR BLD AUTO: 0.4 % — SIGNIFICANT CHANGE UP (ref 0–0.9)
LYMPHOCYTES # BLD AUTO: 2.23 K/UL — SIGNIFICANT CHANGE UP (ref 1–3.3)
LYMPHOCYTES # BLD AUTO: 21.8 % — SIGNIFICANT CHANGE UP (ref 13–44)
MCHC RBC-ENTMCNC: 23 PG — LOW (ref 27–34)
MCHC RBC-ENTMCNC: 30.6 GM/DL — LOW (ref 32–36)
MCV RBC AUTO: 75.2 FL — LOW (ref 80–100)
MONOCYTES # BLD AUTO: 0.48 K/UL — SIGNIFICANT CHANGE UP (ref 0–0.9)
MONOCYTES NFR BLD AUTO: 4.7 % — SIGNIFICANT CHANGE UP (ref 2–14)
NEUTROPHILS # BLD AUTO: 7 K/UL — SIGNIFICANT CHANGE UP (ref 1.8–7.4)
NEUTROPHILS NFR BLD AUTO: 68.6 % — SIGNIFICANT CHANGE UP (ref 43–77)
NRBC # BLD: 0 /100 WBCS — SIGNIFICANT CHANGE UP (ref 0–0)
NRBC # FLD: 0 K/UL — SIGNIFICANT CHANGE UP (ref 0–0)
PLATELET # BLD AUTO: 283 K/UL — SIGNIFICANT CHANGE UP (ref 150–400)
POTASSIUM SERPL-MCNC: 3.9 MMOL/L — SIGNIFICANT CHANGE UP (ref 3.5–5.3)
POTASSIUM SERPL-SCNC: 3.9 MMOL/L — SIGNIFICANT CHANGE UP (ref 3.5–5.3)
PROT SERPL-MCNC: 7.7 G/DL — SIGNIFICANT CHANGE UP (ref 6–8.3)
RBC # BLD: 4.96 M/UL — SIGNIFICANT CHANGE UP (ref 3.8–5.2)
RBC # FLD: 14.4 % — SIGNIFICANT CHANGE UP (ref 10.3–14.5)
RH IG SCN BLD-IMP: POSITIVE — SIGNIFICANT CHANGE UP
SODIUM SERPL-SCNC: 135 MMOL/L — SIGNIFICANT CHANGE UP (ref 135–145)
WBC # BLD: 10.21 K/UL — SIGNIFICANT CHANGE UP (ref 3.8–10.5)
WBC # FLD AUTO: 10.21 K/UL — SIGNIFICANT CHANGE UP (ref 3.8–10.5)

## 2023-08-23 PROCEDURE — 99285 EMERGENCY DEPT VISIT HI MDM: CPT

## 2023-08-23 RX ORDER — LEUCOVORIN CALCIUM 5 MG
7.2 TABLET ORAL ONCE
Refills: 0 | Status: DISCONTINUED | OUTPATIENT
Start: 2023-08-23 | End: 2023-08-23

## 2023-08-23 RX ORDER — LEUCOVORIN CALCIUM 5 MG
7.5 TABLET ORAL ONCE
Refills: 0 | Status: COMPLETED | OUTPATIENT
Start: 2023-08-23 | End: 2023-08-23

## 2023-08-23 RX ADMIN — Medication 7.5 MILLIGRAM(S): at 13:29

## 2023-08-23 NOTE — ED ADULT TRIAGE NOTE - CHIEF COMPLAINT QUOTE
Pt advised to come to ER by MD Medina for methotrexate injection, reports having ectopic pregnancy. Was seen here 2 days ago.

## 2023-08-23 NOTE — CONSULT NOTE ADULT - SUBJECTIVE AND OBJECTIVE BOX
FRITZ ALDANA  37y  Female [id number]    HPI:        Name of GYN Physician:   OBHx:    GynHx: Denies fibroids, cysts, endometriosis, STI's, Abnormal pap smears   PMH:  PSH:  Meds:  Allx:  Social History:  Denies smoking use, drug use, alcohol use.   +occasional social alcohol use    Vital Signs Last 24 Hrs  T(C): 36.9 (23 Aug 2023 10:39), Max: 36.9 (23 Aug 2023 10:39)  T(F): 98.4 (23 Aug 2023 10:39), Max: 98.4 (23 Aug 2023 10:39)  HR: 88 (23 Aug 2023 10:39) (88 - 88)  BP: 109/71 (23 Aug 2023 10:39) (109/71 - 109/71)  BP(mean): --  RR: 16 (23 Aug 2023 10:39) (16 - 16)  SpO2: 99% (23 Aug 2023 10:39) (99% - 99%)    Parameters below as of 23 Aug 2023 10:39  Patient On (Oxygen Delivery Method): room air        Physical Exam:   General: sitting comfortably in bed, NAD   HEENT: neck supple, full ROM  CV: RRR  Lungs: CTA b/l, good air flow b/l   Back: No CVA tenderness  Abd: Soft, non-tender, non-distended.  Bowel sounds present.    :  No bleeding on pad.    External labia wnl.  Bimanual exam with no cervical motion tenderness, uterus wnl, adnexa non palpable b/l.  Cervix closed vs. Cervix dilated  Speculum Exam: No active bleeding from os.  Physiologic discharge.    Ext: non-tender b/l, no edema     LABS:                     11.4   10.21 )-----------( 283      ( 23 Aug 2023 11:15 )             37.3     08-23    135  |  104  |  8   ----------------------------<  110<H>  3.9   |  21<L>  |  0.51    Ca    9.0      23 Aug 2023 11:15    TPro  7.7  /  Alb  4.2  /  TBili  0.2  /  DBili  x   /  AST  13  /  ALT  16  /  AlkPhos  99  08-23    I&O's Detail      Urinalysis Basic - ( 23 Aug 2023 11:15 )    Color: x / Appearance: x / SG: x / pH: x  Gluc: 110 mg/dL / Ketone: x  / Bili: x / Urobili: x   Blood: x / Protein: x / Nitrite: x   Leuk Esterase: x / RBC: x / WBC x   Sq Epi: x / Non Sq Epi: x / Bacteria: x        RADIOLOGY & ADDITIONAL STUDIES:     FRITZ ALDANA  37y  Female [id number]    HPI: 36yo  LMP  9w5d presents for evaluation and for dose of Leucovorin s/p MTX therapy for treatment of cornual ectopic pregnancy. Patient was seen in [facility] ED on  for abdominal pain in setting of known pregnancy. Patient states she typically follows with Dr. Armstrong and saw him at the beginning of this pregnancy but she made an appointment with Dr. Medina for  for a second opinion as she was told she had a missed ab. Patient now diagnosed with cornual ectopic pregnancy and will receive multidose MTX therapy. Patient received first dose of MTX yesterday  outpt with Dr. Medina and was advised to report to ED for dose of Leucovorin. Patient admits to intermittent abdominal cramping but denies vaginal bleeding, chest pain, SOB, fevers, chills, nausea or vomiting. Patient continues to take Keflex as prescribed by [facility] for UTI.       Name of GYN Physician: Dr Medina/Patti  OBHx: 2016 FT , SAB x1 6wk D&C requiring transfusion  GYNHx: denies fibroids, cysts, abnormal paps, STDs  PMH: denies  PSH: denies  Meds: Keflex  All: NKDA  Soc: no substance use, anxiety/depression    Vital Signs Last 24 Hrs  T(C): 36.9 (23 Aug 2023 10:39), Max: 36.9 (23 Aug 2023 10:39)  T(F): 98.4 (23 Aug 2023 10:39), Max: 98.4 (23 Aug 2023 10:39)  HR: 88 (23 Aug 2023 10:39) (88 - 88)  BP: 109/71 (23 Aug 2023 10:39) (109/71 - 109/71)  BP(mean): --  RR: 16 (23 Aug 2023 10:39) (16 - 16)  SpO2: 99% (23 Aug 2023 10:39) (99% - 99%)    Parameters below as of 23 Aug 2023 10:39  Patient On (Oxygen Delivery Method): room air        Physical Exam:   General: sitting comfortably in bed, NAD   HEENT: neck supple, full ROM  CV: RRR  Lungs: CTA b/l, good air flow b/l   Back: No CVA tenderness  Abd: Soft, non-tender, non-distended.  Bowel sounds present.    :  No bleeding on pad.        LABS:                     11.4   10.21 )-----------( 283      ( 23 Aug 2023 11:15 )             37.3         135  |  104  |  8   ----------------------------<  110<H>  3.9   |  21<L>  |  0.51    Ca    9.0      23 Aug 2023 11:15    TPro  7.7  /  Alb  4.2  /  TBili  0.2  /  DBili  x   /  AST  13  /  ALT  16  /  AlkPhos  99      I&O's Detail      Urinalysis Basic - ( 23 Aug 2023 11:15 )    Color: x / Appearance: x / SG: x / pH: x  Gluc: 110 mg/dL / Ketone: x  / Bili: x / Urobili: x   Blood: x / Protein: x / Nitrite: x   Leuk Esterase: x / RBC: x / WBC x   Sq Epi: x / Non Sq Epi: x / Bacteria: x

## 2023-08-23 NOTE — ED PROVIDER NOTE - NSFOLLOWUPINSTRUCTIONS_ED_ALL_ED_FT
You were given a dose of leucovorin today. Follow up for repeat dose of methotrexate tomorrow.    Return to ER if you develop vaginal bleeding with soaking through more than two pads per hour, or associated chest pain, shortness of breath, palpitations, dizziness.

## 2023-08-23 NOTE — ED PROVIDER NOTE - PROGRESS NOTE DETAILS
Noah: appreciate gyn recs. leucovorin dose today, not due for methotrexate until tomorrow. GYN will contact patient regarding where to go for next dose. Will dc with return recs

## 2023-08-23 NOTE — ED PROVIDER NOTE - PATIENT PORTAL LINK FT
You can access the FollowMyHealth Patient Portal offered by [facility] by registering at the following website: [email or web address]. By joining [facility]’s FollowMyHealth portal, you will also be able to view your health information using other applications (apps) compatible with our system.

## 2023-08-23 NOTE — CONSULT NOTE ADULT - ASSESSMENT
36yo  LMP  9w5d presenting for Leucovorin in setting of multidose MTX therapy for management of cornual ectopic pregnancy. Patient received first dose of MTX yesterday outpt w/ Dr. Medina. Patients bHCG downtrending [phone] and remaining labs within normal limits. Patient to receive MTX on days 1,3,5,7 and Leucovorin on days 2,4,6,8 of the treatment cycle. Discussed with both pharmacy and ED attending dose of Leucovorin is 0.1 mg/kg and patient to take medication orally. Patients dose 7.2, pharmacy to give 7.5mg.     - Patient to return to ED  for second dose of MTX  - Strict return precautions such as severe abdominal pain, vomiting, fever >100.4, heavy vaginal bleeding such as saturating 2+ pads an hour.    GILA Rosario  d/w  38yo  LMP  9w5d presenting for Leucovorin in setting of multidose MTX therapy for management of cornual ectopic pregnancy. Patient received first dose of MTX yesterday outpt w/ Dr. Medina. Patients bHCG downtrending [phone] and remaining labs within normal limits. Patient to receive MTX on days 1,3,5,7 and Leucovorin on days 2,4,6,8 of the treatment cycle. Discussed with both pharmacy and ED attending dose of Leucovorin is 0.1 mg/kg and patient to take medication orally. Patients dose 7.2, pharmacy to give 7.5mg.     - Patient to return to ED  for second dose of MTX  - Strict return precautions such as severe abdominal pain, vomiting, fever >100.4, heavy vaginal bleeding such as saturating 2+ pads an hour.    GILA Rosario  d/w PGY4 Antonia  d/w Dr. Medina

## 2023-08-23 NOTE — ED ADULT TRIAGE NOTE - MODE OF ARRIVAL
Walk in
I have personally seen and examined this patient.  I have fully participated in the care of this patient. I have reviewed all pertinent clinical information, including history, physical exam, plan and the Resident’s note and agree except as noted.

## 2023-08-23 NOTE — ED PROVIDER NOTE - CLINICAL SUMMARY MEDICAL DECISION MAKING FREE TEXT BOX
38 yo F  at 9wga by dates, failed pregnancy on last US, sent in for second dose of methotrexate. First dose given in the office yesterday. Pt denies vaginal bleeding. Reports continued lower pelvic pain, but improved since last visit. GYN consulted, rec basic labs. 38 yo F  at 9wga by dates, failed pregnancy on last US and ? cornual ectopic, sent in for second dose of methotrexate. First dose given in the office yesterday. Pt denies vaginal bleeding. Reports continued lower pelvic pain, but improved since last visit. GYN consulted, rec basic labs.   VITALS: reviewed  GEN: NAD, A & O x 4  HEAD/EYES: NCAT, EOMI, anicteric sclerae,   ENT: mucus membranes moist, oropharynx WNL, trachea midline,   RESP: unlabored breathing  CV: distal pulses intact and symmetric bilaterally  ABD: suprapubic ttp  MSK: extremities atraumatic and nontender, no edema, no asymmetry.   SKIN: warm, dry, no rash, no bruising, no cyanosis. color appropriate for ethnicity  NEURO: alert, mentating appropriately, no facial asymmetry.  PSYCH: Affect appropriate

## 2023-08-24 ENCOUNTER — APPOINTMENT (OUTPATIENT)
Dept: OBGYN | Facility: CLINIC | Age: 38
End: 2023-08-24

## 2023-08-24 ENCOUNTER — EMERGENCY (EMERGENCY)
Facility: HOSPITAL | Age: 38
LOS: 1 days | Discharge: HOME | End: 2023-08-24
Attending: STUDENT IN AN ORGANIZED HEALTH CARE EDUCATION/TRAINING PROGRAM | Admitting: STUDENT IN AN ORGANIZED HEALTH CARE EDUCATION/TRAINING PROGRAM
Payer: COMMERCIAL

## 2023-08-24 VITALS
DIASTOLIC BLOOD PRESSURE: 69 MMHG | HEART RATE: 73 BPM | SYSTOLIC BLOOD PRESSURE: 108 MMHG | OXYGEN SATURATION: 100 % | TEMPERATURE: 98 F | RESPIRATION RATE: 18 BRPM

## 2023-08-24 VITALS
OXYGEN SATURATION: 100 % | RESPIRATION RATE: 17 BRPM | TEMPERATURE: 98 F | DIASTOLIC BLOOD PRESSURE: 81 MMHG | SYSTOLIC BLOOD PRESSURE: 118 MMHG | HEART RATE: 90 BPM

## 2023-08-24 LAB
ALBUMIN SERPL ELPH-MCNC: 4.1 G/DL — SIGNIFICANT CHANGE UP (ref 3.3–5)
ALP SERPL-CCNC: 96 U/L — SIGNIFICANT CHANGE UP (ref 40–120)
ALT FLD-CCNC: 15 U/L — SIGNIFICANT CHANGE UP (ref 4–33)
ANION GAP SERPL CALC-SCNC: 10 MMOL/L — SIGNIFICANT CHANGE UP (ref 7–14)
AST SERPL-CCNC: 12 U/L — SIGNIFICANT CHANGE UP (ref 4–32)
BASOPHILS # BLD AUTO: 0.05 K/UL — SIGNIFICANT CHANGE UP (ref 0–0.2)
BASOPHILS NFR BLD AUTO: 0.5 % — SIGNIFICANT CHANGE UP (ref 0–2)
BILIRUB SERPL-MCNC: 0.2 MG/DL — SIGNIFICANT CHANGE UP (ref 0.2–1.2)
BUN SERPL-MCNC: 8 MG/DL — SIGNIFICANT CHANGE UP (ref 7–23)
CALCIUM SERPL-MCNC: 8.9 MG/DL — SIGNIFICANT CHANGE UP (ref 8.4–10.5)
CHLORIDE SERPL-SCNC: 102 MMOL/L — SIGNIFICANT CHANGE UP (ref 98–107)
CO2 SERPL-SCNC: 23 MMOL/L — SIGNIFICANT CHANGE UP (ref 22–31)
CREAT SERPL-MCNC: 0.49 MG/DL — LOW (ref 0.5–1.3)
EGFR: 125 ML/MIN/1.73M2 — SIGNIFICANT CHANGE UP
EOSINOPHIL # BLD AUTO: 0.3 K/UL — SIGNIFICANT CHANGE UP (ref 0–0.5)
EOSINOPHIL NFR BLD AUTO: 3.1 % — SIGNIFICANT CHANGE UP (ref 0–6)
GLUCOSE SERPL-MCNC: 118 MG/DL — HIGH (ref 70–99)
HCG SERPL-ACNC: SIGNIFICANT CHANGE UP MIU/ML
HCT VFR BLD CALC: 36.7 % — SIGNIFICANT CHANGE UP (ref 34.5–45)
HGB BLD-MCNC: 11.5 G/DL — SIGNIFICANT CHANGE UP (ref 11.5–15.5)
IANC: 6.77 K/UL — SIGNIFICANT CHANGE UP (ref 1.8–7.4)
IMM GRANULOCYTES NFR BLD AUTO: 0.3 % — SIGNIFICANT CHANGE UP (ref 0–0.9)
LYMPHOCYTES # BLD AUTO: 2.19 K/UL — SIGNIFICANT CHANGE UP (ref 1–3.3)
LYMPHOCYTES # BLD AUTO: 22.6 % — SIGNIFICANT CHANGE UP (ref 13–44)
MCHC RBC-ENTMCNC: 23.9 PG — LOW (ref 27–34)
MCHC RBC-ENTMCNC: 31.3 GM/DL — LOW (ref 32–36)
MCV RBC AUTO: 76.1 FL — LOW (ref 80–100)
MONOCYTES # BLD AUTO: 0.33 K/UL — SIGNIFICANT CHANGE UP (ref 0–0.9)
MONOCYTES NFR BLD AUTO: 3.4 % — SIGNIFICANT CHANGE UP (ref 2–14)
NEUTROPHILS # BLD AUTO: 6.77 K/UL — SIGNIFICANT CHANGE UP (ref 1.8–7.4)
NEUTROPHILS NFR BLD AUTO: 70.1 % — SIGNIFICANT CHANGE UP (ref 43–77)
NRBC # BLD: 0 /100 WBCS — SIGNIFICANT CHANGE UP (ref 0–0)
NRBC # FLD: 0 K/UL — SIGNIFICANT CHANGE UP (ref 0–0)
PLATELET # BLD AUTO: 287 K/UL — SIGNIFICANT CHANGE UP (ref 150–400)
POTASSIUM SERPL-MCNC: 3.9 MMOL/L — SIGNIFICANT CHANGE UP (ref 3.5–5.3)
POTASSIUM SERPL-SCNC: 3.9 MMOL/L — SIGNIFICANT CHANGE UP (ref 3.5–5.3)
PROT SERPL-MCNC: 7.5 G/DL — SIGNIFICANT CHANGE UP (ref 6–8.3)
RBC # BLD: 4.82 M/UL — SIGNIFICANT CHANGE UP (ref 3.8–5.2)
RBC # FLD: 14.3 % — SIGNIFICANT CHANGE UP (ref 10.3–14.5)
SODIUM SERPL-SCNC: 135 MMOL/L — SIGNIFICANT CHANGE UP (ref 135–145)
WBC # BLD: 9.67 K/UL — SIGNIFICANT CHANGE UP (ref 3.8–10.5)
WBC # FLD AUTO: 9.67 K/UL — SIGNIFICANT CHANGE UP (ref 3.8–10.5)

## 2023-08-24 PROCEDURE — 99285 EMERGENCY DEPT VISIT HI MDM: CPT

## 2023-08-24 RX ORDER — LEUCOVORIN CALCIUM 5 MG
1.5 TABLET ORAL
Qty: 1.5 | Refills: 0
Start: 2023-08-24 | End: 2023-08-24

## 2023-08-24 RX ORDER — METHOTREXATE 2.5 MG/1
72 TABLET ORAL ONCE
Refills: 0 | Status: COMPLETED | OUTPATIENT
Start: 2023-08-24 | End: 2023-08-24

## 2023-08-24 RX ADMIN — METHOTREXATE 72 MILLIGRAM(S): 2.5 TABLET ORAL at 16:39

## 2023-08-24 NOTE — ED ADULT NURSE REASSESSMENT NOTE - NS ED NURSE REASSESS COMMENT FT1
MAMADOU Mitchell RN: patient alert and oriented x4 ambulatory, well appearing, med given as ordered, patient tolerated well. pending DC at this time

## 2023-08-24 NOTE — ED PROVIDER NOTE - NSFOLLOWUPINSTRUCTIONS_ED_ALL_ED_FT
Please return to ED on Saturday 8/26 for repeat bloodwork (bhcg) and possible methotrexate   Take leucovorin tomorrow as directed  Do not to have sexual intercourse, breast feed (if applicable), take NSAIDs/Aspirin, or drink alcohol while on this medication.  -Reviewed with patient the 15-20% methotrexate failure rate and possibility of necessity for additional dose of methotrexate.   - Call your doctor or return to ED if you experiences any severe abdominal pain, dizziness, lightheadedness, severe n/v, or any heavy vaginal bleeding >2 pads/hour for >2 hours.          ECTOPIC PREGNANCY - General Information    Ectopic Pregnancy    WHAT YOU NEED TO KNOW:    What is an ectopic pregnancy? Ectopic pregnancy occurs when a fertilized egg attaches and begins to grow outside of the uterus. The most common place for this to happen is in the fallopian tube. This is sometimes called a tubal pregnancy. The egg can also implant on the outside of the uterus, on the ovary or cervix, or in the abdomen. The egg may begin to grow, but the pregnancy cannot continue normally. Ectopic pregnancy can cause heavy bleeding and may be life-threatening.    What increases my risk for an ectopic pregnancy?    Pelvic inflammatory disease (PID) or infections, such as chlamydia    A past ectopic pregnancy, or past fertility problems    Fallopian tube injury or damage    Getting pregnant when you have an intrauterine device (IUD)    Past surgery in your abdomen or on your reproductive organs    Medicines to treat infertility or that contain female hormones    Certain fertility treatments, such as multiple embryo transplant    Smoking cigarettes    Age older than 35 years  What are the signs and symptoms of ectopic pregnancy?    One-sided abdominal or pelvic pain and cramping    Vaginal bleeding or spotting that happens about 7 weeks after your missed period    Nausea or vomiting    Dizziness, weakness, or fainting    Tissue coming out of your vagina  How is ectopic pregnancy diagnosed? Your healthcare provider will examine you and ask about other medical conditions or surgeries you have had. The provider will ask about pregnancies, miscarriages, infertility treatments, and sexually transmitted infections (STIs) you have had before. You may need any of the following:    A pelvic exam is used to check the size and shape of your uterus, cervix, and ovaries.    Blood and urine tests will show if you are currently pregnant, or if you have infections or other problems.    Ultrasound pictures may be taken of the inside of your uterus, ovaries, and abdomen. An ultrasound is usually done over your abdomen, but you may also need a vaginal ultrasound. During a vaginal ultrasound, a small tube is placed into your vagina. This can help healthcare providers see areas that may be hard to see during an abdominal ultrasound.  How is ectopic pregnancy treated? Your body may absorb the pregnancy tissues and your symptoms may decrease without any treatment. If this does not happen, you may need any of the following:    Medicine called methotrexate may be given to stop the pregnancy. This may be given as an injection. You may need more than one dose. It is important to follow up with your healthcare provider as directed if you receive this medicine.    Surgery may be done to repair or remove tissue or ruptured fallopian tubes. Your healthcare provider will talk to you about possible kinds of surgery. Your provider will consider where the ectopic pregnancy is located and the damage it caused. Talk to your provider about your desire to have children in the future. Some kinds of surgery will prevent future pregnancy.  Where can I get support and more information?    The [employer]  [address] [address]  [place] [address]  Phone: [phone]  Phone: 1-859.257.6344  Web Address: http://www.acog.org  Call your local emergency number (911 in the [place]) if:    You have chest pain or trouble breathing.    Call your doctor if:    You have sharp pain in your lower abdomen that is severe and starts suddenly.    You feel lightheaded or like you are going to faint.    You have increasing abdominal or pelvic pain or heavy vaginal bleeding.    You have shoulder pain.    You have a fever.    You have questions or concerns about your condition or care.  CARE AGREEMENT:    You have the right to help plan your care. Learn about your health condition and how it may be treated. Discuss treatment options with your healthcare providers to decide what care you want to receive. You always have the right to refuse treatment.

## 2023-08-24 NOTE — ED ADULT NURSE NOTE - NSFALLLASTSIX_ED_ALL_ED
[FreeTextEntry3] : I, Poly Luciano, acted as a scribe on behalf of Dr. Jennifer Wills D.O. on 08/08/2023.  All medical entries made by the scribe were at my, Dr. Jennifer Wills D.O, direction and personally dictated by me on 08/08/2023. I have reviewed the chart and agree that the record accurately reflects my personal performance of the history, physical exam, assessment and plan. I have also personally directed, reviewed, and agreed with the chart. No.

## 2023-08-24 NOTE — ED ADULT NURSE NOTE - NSFALLUNIVINTERV_ED_ALL_ED
Bed/Stretcher in lowest position, wheels locked, appropriate side rails in place/Call bell, personal items and telephone in reach/Instruct patient to call for assistance before getting out of bed/chair/stretcher/Non-slip footwear applied when patient is off stretcher/[place] to call system/Physically safe environment - no spills, clutter or unnecessary equipment/Purposeful proactive rounding/Room/bathroom lighting operational, light cord in reach

## 2023-08-24 NOTE — ED ADULT TRIAGE NOTE - CHIEF COMPLAINT QUOTE
reports here to get Methotrexate injection for ectopic pregnancy, had one done 2 days ago. denies abd pain, vaginal bleeding.

## 2023-08-24 NOTE — ED PROVIDER NOTE - PATIENT PORTAL LINK FT
You can access the FollowMyHealth Patient Portal offered by [facility] by registering at the following website: [email or web address]. By joining [facility]’s FollowMyHealth portal, you will also be able to view your health information using other applications (apps) compatible with our system.

## 2023-08-24 NOTE — ED PROVIDER NOTE - PHYSICAL EXAMINATION
Gen: Well appearing in NAD  Head: NC/AT  Neck: trachea midline  Resp:  No distress  Ext: no deformities  Neuro:  A&O appears non focal  Skin:  Warm and dry as visualized  Psych:  Normal affect and mood  abd nontender

## 2023-08-24 NOTE — ED PROVIDER NOTE - OBJECTIVE STATEMENT
36yo  LMP  9w5d presenting for methotrexate in setting of multidose MTX therapy for management of cornual ectopic pregnancy. Patient received first dose of leocovorin yesterday.  Patient to receive MTX on days 1,3,5,7 and Leucovorin on days 2,4,6,8 of the treatment cycle. Discussed with both pharmacy and ED attending dose of Leucovorin is 0.1 mg/kg and patient to take medication orally.   pt denies abd pain or vaginal bleeding but states she doesn't feel well but no fevers, chills or pain

## 2023-08-24 NOTE — CONSULT NOTE ADULT - ASSESSMENT
**incomplete note** **incomplete note**    38yo  LMP  9w5d presenting for Leucovorin in setting of multidose MTX therapy for management of cornual ectopic pregnancy. Patient received first dose of MTX yesterday outpt w/ Dr. Medina. Patients bHCG downtrending [phone] and remaining labs within normal limits. Patient to receive MTX on days 1,3,5,7 and Leucovorin on days 2,4,6,8 of the treatment cycle. Discussed with both pharmacy and ED attending dose of Leucovorin is 0.1 mg/kg and patient to take medication orally. Patients dose 7.2, pharmacy to give 7.5mg.         GILA Rosario  d/w Dr. Correa  36yo  LMP  presenting for 2nd dose of Methotrexate in setting of multidose MTX therapy for management of cornual ectopic pregnancy. Patient received first dose of MTX  outpt w/ Dr. Medina. Patients bHCG downtrending [phone] and remaining labs within normal limits. Patient to receive MTX on days 1,3,5,7 and Leucovorin on days 2,4,6,8 of the treatment cycle. Discussed with ED attending dose of Leucovorin is 0.1 mg/kg and patient to take medication orally, pharmacy to give 7.5mg. Patient to take leucovorin tomorrow at home  and return to ED  for repeat bHCG. If patients bHCG drops >15% from today's level, patient not to receive third dose of Methotrexate and is to then follow up bHCG weekly until level is noted to be <5. At that time patient is to stop taking Leucovorin as well.     - Pt informed not to have sexual intercourse, breast feed (if applicable), take NSAIDs/Aspirin, or drink alcohol while on this medication.  -Reviewed with patient the 15-20% methotrexate failure rate and possibility of necessity for additional dose of methotrexate.   -Consents for methotrexate signed with patient with attending at bedside.    -Patient given strict precautions to call her physician or return to ED if she experiences any severe abdominal pain, dizziness, lightheadedness, severe n/v, or any heavy vaginal bleeding >2 pads/hour for >2 hours.    -Patient instructed on need for follow up of b-hcg on   -Once patient receives methotrexate therapy she is cleared for discharge from OBGYN perspective.  Primary managment per ED team.     GILA Rosario PGY3  d/w Dr. Correa

## 2023-08-24 NOTE — ED ADULT NURSE NOTE - CHIEF COMPLAINT
The patient is a 37y Female complaining of  PHYSICAL EXAM:                GENERAL:     elderly female lying in the bed is in acute distress with pain. ,   	HEAD:     atraumatic, normocephalic  	EYES:     EOMI, conjunctiva and sclera clear  	ENMT:     no tonsillar erythema or exudates or enlargement, no oral lesions, moist mucous membranes, good dentition  	NECK:     supple, no JVD  	RESPIRATORY:     clear to auscultation bilaterally, no rales or rhonchi or wheezing or rubs  	CARDIOVASCULAR:     regular rate and rhythm, no murmurs or rubs or gallops, 2+ peripheral pulses  	GASTROINTESTINAL:     soft, nontender, nondistended, no hepatosplenomegaly palpated, bowel sounds present  	EXTREMITIES:     no clubbing or cyanosis or edema  	positive tenderness in the right hip joint , shortened right lower leg.   	NERVOUS SYSTEM:    AAOx3 , no gross sensory deficits  	PSYCH:     appropriate, , good concentration

## 2023-08-24 NOTE — ED PROVIDER NOTE - CLINICAL SUMMARY MEDICAL DECISION MAKING FREE TEXT BOX
36yo  LMP  9w5d presenting for methotrexate in setting of multidose MTX therapy for management of cornual ectopic pregnancy. Patient received first dose of leocovorin yesterday.  Patient to receive MTX on days 1,3,5,7 and Leucovorin on days 2,4,6,8 of the treatment cycle. Discussed with both pharmacy and ED attending dose of Leucovorin is 0.1 mg/kg and patient to take medication orally.   pt denies abd pain or vaginal bleeding but states she doesn't feel well but no fevers, chills or pain   GYN to see, will trend hcg, methotrexate as per gyn

## 2023-08-24 NOTE — CONSULT NOTE ADULT - SUBJECTIVE AND OBJECTIVE BOX
FRITZ ALDANA  37y  Female [id number]    HPI:        Name of GYN Physician:   OBHx:    GynHx: Denies fibroids, cysts, endometriosis, STI's, Abnormal pap smears   PMH:  PSH:  Meds:  Allx:  Social History:  Denies smoking use, drug use, alcohol use.   +occasional social alcohol use    Vital Signs Last 24 Hrs  T(C): 36.8 (24 Aug 2023 12:29), Max: 36.8 (24 Aug 2023 10:22)  T(F): 98.2 (24 Aug 2023 12:29), Max: 98.3 (24 Aug 2023 10:22)  HR: 75 (24 Aug 2023 12:29) (70 - 75)  BP: 109/80 (24 Aug 2023 12:29) (108/69 - 114/76)  BP(mean): --  RR: 17 (24 Aug 2023 12:29) (17 - 18)  SpO2: 100% (24 Aug 2023 12:29) (100% - 100%)    Parameters below as of 24 Aug 2023 12:29  Patient On (Oxygen Delivery Method): room air        Physical Exam:   General: sitting comfortably in bed, NAD   HEENT: neck supple, full ROM  CV: RRR  Lungs: CTA b/l, good air flow b/l   Back: No CVA tenderness  Abd: Soft, non-tender, non-distended.  Bowel sounds present.    :  No bleeding on pad.    External labia wnl.  Bimanual exam with no cervical motion tenderness, uterus wnl, adnexa non palpable b/l.  Cervix closed vs. Cervix dilated  Speculum Exam: No active bleeding from os.  Physiologic discharge.    Ext: non-tender b/l, no edema     LABS:                              11.5   9.67  )-----------( 287      ( 24 Aug 2023 12:00 )             36.7     08-24    135  |  102  |  8   ----------------------------<  118<H>  3.9   |  23  |  0.49<L>    Ca    8.9      24 Aug 2023 12:00    TPro  7.5  /  Alb  4.1  /  TBili  0.2  /  DBili  x   /  AST  12  /  ALT  15  /  AlkPhos  96  08-24    I&O's Detail      Urinalysis Basic - ( 24 Aug 2023 12:00 )    Color: x / Appearance: x / SG: x / pH: x  Gluc: 118 mg/dL / Ketone: x  / Bili: x / Urobili: x   Blood: x / Protein: x / Nitrite: x   Leuk Esterase: x / RBC: x / WBC x   Sq Epi: x / Non Sq Epi: x / Bacteria: x        RADIOLOGY & ADDITIONAL STUDIES:     [facility] KATYA  37y  Female [id number]    HPI: 36yo  LMP  presents for evaluation and 2nd dose of MTX s/p first dose of MTX therapy on  for treatment of cornual ectopic pregnancy. Current treatment regimen is multidose MTX therapy. Patient received first dose of MTX  outpt with Dr. Medina and received dose of Leucovorin yesterday  in the ED. Patient admits to diarrhea and generalized fatigue but denies vaginal bleeding, chest pain, SOB, fevers, chills, nausea or vomiting. Patient took last dose of abx she was prescribed for UTI today.       Name of GYN Physician: Dr Medina  OBHx: 2016 FT , SAB x1 6wk D&C requiring transfusion  GYNHx: denies fibroids, cysts, abnormal paps, STDs  PMH: denies  PSH: denies  Meds: Keflex  All: NKDA  Soc: no substance use, anxiety/depression    Vital Signs Last 24 Hrs  T(C): 36.8 (24 Aug 2023 12:29), Max: 36.8 (24 Aug 2023 10:22)  T(F): 98.2 (24 Aug 2023 12:29), Max: 98.3 (24 Aug 2023 10:22)  HR: 75 (24 Aug 2023 12:29) (70 - 75)  BP: 109/80 (24 Aug 2023 12:29) (108/69 - 114/76)  BP(mean): --  RR: 17 (24 Aug 2023 12:29) (17 - 18)  SpO2: 100% (24 Aug 2023 12:29) (100% - 100%)    Parameters below as of 24 Aug 2023 12:29  Patient On (Oxygen Delivery Method): room air        Physical Exam:   General: sitting comfortably in bed, NAD   HEENT: neck supple, full ROM  CV: RRR  Lungs: CTA b/l, good air flow b/l   Back: No CVA tenderness  Abd: Soft, non-tender, non-distended.  Bowel sounds present.    :  No bleeding on pad.        LABS:                     11.5   9.67  )-----------( 287      ( 24 Aug 2023 12:00 )             36.7         135  |  102  |  8   ----------------------------<  118<H>  3.9   |  23  |  0.49<L>    Ca    8.9      24 Aug 2023 12:00    TPro  7.5  /  Alb  4.1  /  TBili  0.2  /  DBili  x   /  AST  12  /  ALT  15  /  AlkPhos  96  08-24    I&O's Detail      Urinalysis Basic - ( 24 Aug 2023 12:00 )    Color: x / Appearance: x / SG: x / pH: x  Gluc: 118 mg/dL / Ketone: x  / Bili: x / Urobili: x   Blood: x / Protein: x / Nitrite: x   Leuk Esterase: x / RBC: x / WBC x   Sq Epi: x / Non Sq Epi: x / Bacteria: x

## 2023-08-24 NOTE — ED ADULT NURSE NOTE - NSFALLASSESSNEED_ED_ALL_ED
Medical Records request - Subpoena from [address] [place] [address] - sent to scan stat 12/26/17 with check for records included no

## 2023-08-26 ENCOUNTER — EMERGENCY (EMERGENCY)
Facility: HOSPITAL | Age: 38
LOS: 1 days | Discharge: HOME | End: 2023-08-26
Attending: EMERGENCY MEDICINE | Admitting: EMERGENCY MEDICINE
Payer: COMMERCIAL

## 2023-08-26 VITALS
OXYGEN SATURATION: 99 % | TEMPERATURE: 98 F | SYSTOLIC BLOOD PRESSURE: 109 MMHG | HEIGHT: 62 IN | RESPIRATION RATE: 18 BRPM | HEART RATE: 73 BPM | DIASTOLIC BLOOD PRESSURE: 71 MMHG

## 2023-08-26 VITALS
SYSTOLIC BLOOD PRESSURE: 112 MMHG | OXYGEN SATURATION: 100 % | TEMPERATURE: 98 F | HEART RATE: 80 BPM | RESPIRATION RATE: 16 BRPM | DIASTOLIC BLOOD PRESSURE: 81 MMHG

## 2023-08-26 LAB
ALBUMIN SERPL ELPH-MCNC: 4.2 G/DL — SIGNIFICANT CHANGE UP (ref 3.3–5)
ALP SERPL-CCNC: 93 U/L — SIGNIFICANT CHANGE UP (ref 40–120)
ALT FLD-CCNC: 17 U/L — SIGNIFICANT CHANGE UP (ref 4–33)
ANION GAP SERPL CALC-SCNC: 14 MMOL/L — SIGNIFICANT CHANGE UP (ref 7–14)
AST SERPL-CCNC: 13 U/L — SIGNIFICANT CHANGE UP (ref 4–32)
BILIRUB SERPL-MCNC: <0.2 MG/DL — SIGNIFICANT CHANGE UP (ref 0.2–1.2)
BUN SERPL-MCNC: 10 MG/DL — SIGNIFICANT CHANGE UP (ref 7–23)
CALCIUM SERPL-MCNC: 9.3 MG/DL — SIGNIFICANT CHANGE UP (ref 8.4–10.5)
CHLORIDE SERPL-SCNC: 100 MMOL/L — SIGNIFICANT CHANGE UP (ref 98–107)
CO2 SERPL-SCNC: 23 MMOL/L — SIGNIFICANT CHANGE UP (ref 22–31)
CREAT SERPL-MCNC: 0.5 MG/DL — SIGNIFICANT CHANGE UP (ref 0.5–1.3)
EGFR: 125 ML/MIN/1.73M2 — SIGNIFICANT CHANGE UP
GLUCOSE SERPL-MCNC: 109 MG/DL — HIGH (ref 70–99)
HCG SERPL-ACNC: SIGNIFICANT CHANGE UP MIU/ML
HCT VFR BLD CALC: 36.7 % — SIGNIFICANT CHANGE UP (ref 34.5–45)
HGB BLD-MCNC: 11.5 G/DL — SIGNIFICANT CHANGE UP (ref 11.5–15.5)
MCHC RBC-ENTMCNC: 23.2 PG — LOW (ref 27–34)
MCHC RBC-ENTMCNC: 31.3 GM/DL — LOW (ref 32–36)
MCV RBC AUTO: 74.1 FL — LOW (ref 80–100)
NRBC # BLD: 0 /100 WBCS — SIGNIFICANT CHANGE UP (ref 0–0)
NRBC # FLD: 0 K/UL — SIGNIFICANT CHANGE UP (ref 0–0)
PLATELET # BLD AUTO: 274 K/UL — SIGNIFICANT CHANGE UP (ref 150–400)
POTASSIUM SERPL-MCNC: 3.7 MMOL/L — SIGNIFICANT CHANGE UP (ref 3.5–5.3)
POTASSIUM SERPL-SCNC: 3.7 MMOL/L — SIGNIFICANT CHANGE UP (ref 3.5–5.3)
PROT SERPL-MCNC: 7.6 G/DL — SIGNIFICANT CHANGE UP (ref 6–8.3)
RBC # BLD: 4.95 M/UL — SIGNIFICANT CHANGE UP (ref 3.8–5.2)
RBC # FLD: 14.2 % — SIGNIFICANT CHANGE UP (ref 10.3–14.5)
SODIUM SERPL-SCNC: 137 MMOL/L — SIGNIFICANT CHANGE UP (ref 135–145)
WBC # BLD: 8.95 K/UL — SIGNIFICANT CHANGE UP (ref 3.8–10.5)
WBC # FLD AUTO: 8.95 K/UL — SIGNIFICANT CHANGE UP (ref 3.8–10.5)

## 2023-08-26 PROCEDURE — 99285 EMERGENCY DEPT VISIT HI MDM: CPT

## 2023-08-26 RX ORDER — LEUCOVORIN CALCIUM 5 MG
7.5 TABLET ORAL ONCE
Refills: 0 | Status: COMPLETED | OUTPATIENT
Start: 2023-08-26 | End: 2023-08-26

## 2023-08-26 RX ORDER — LEUCOVORIN CALCIUM 5 MG
1.5 TABLET ORAL
Qty: 1.5 | Refills: 0
Start: 2023-08-26 | End: 2023-08-26

## 2023-08-26 RX ORDER — METHOTREXATE 2.5 MG/1
72 TABLET ORAL ONCE
Refills: 0 | Status: COMPLETED | OUTPATIENT
Start: 2023-08-26 | End: 2023-08-26

## 2023-08-26 RX ADMIN — METHOTREXATE 72 MILLIGRAM(S): 2.5 TABLET ORAL at 16:01

## 2023-08-26 RX ADMIN — Medication 7.5 MILLIGRAM(S): at 17:38

## 2023-08-26 NOTE — ED ADULT NURSE REASSESSMENT NOTE - NS ED NURSE REASSESS COMMENT FT1
Medication given to pt to take at home secondary to pharmacy being closed - pt educated by RN and MD on proper use/dose of medication. Pt verbalized understanding. Medication given to pt to take at home per  secondary to pharmacy being closed - pt educated by RN and MD on proper use/dose of medication. Pt verbalized understanding.

## 2023-08-26 NOTE — CONSULT NOTE ADULT - SUBJECTIVE AND OBJECTIVE BOX
FRITZ ALDANA  37y  Female [id number]    HPI: 36 yo  LMP  presenting for management of cornual ectopic pregnancy. She has received two doses of methotrexate and is taking leucovorin rescue. Her trend is as follows    She is         Name of GYN Physician:     POB:      Pgyn: Denies fibroids, cysts, endometriosis, STI's, Abnormal pap smears     Home meds:     Hospital Meds:   MEDICATIONS  (STANDING):  methotrexate Injectable (Non - oncologic) 72 milliGRAM(s) IntraMuscular once    MEDICATIONS  (PRN):      Allergies    No Known Allergies    Intolerances        PAST MEDICAL & SURGICAL HISTORY:  No pertinent past medical history      Migraine      No significant past surgical history          Social History:  Denies smoking use, drug use, alcohol use.   +occasional social alcohol use    Vital Signs Last 24 Hrs  T(C): 36.9 (26 Aug 2023 12:51), Max: 36.9 (26 Aug 2023 09:39)  T(F): 98.4 (26 Aug 2023 12:51), Max: 98.4 (26 Aug 2023 09:39)  HR: 70 (26 Aug 2023 12:51) (70 - 73)  BP: 112/76 (26 Aug 2023 12:51) (109/71 - 112/76)  BP(mean): --  RR: 16 (26 Aug 2023 12:51) (16 - 18)  SpO2: 99% (26 Aug 2023 12:51) (99% - 99%)        Physical Exam:   General: sitting comftorably in bed, NAD   HEENT: neck supple, full ROM  CV: RR S1S2 no m/r/g  Lungs: CTA b/l, good air flow b/l   Back: No CVA tenderness  Abd: Soft, non-tender, non-distended.  Bowel sounds present.    :  No bleeding on pad.    External labia wnl.  Bimanual exam with no cervical motion tenderness, uterus wnl, adnexa non palpable b/l.  Cervix closed vs. Cervix dilated    cm   Speculum Exam: No active bleeding from os.  Physiologic discharge.    Ext: non-tender b/l, no edema     LABS:                              11.5   8.95  )-----------( 274      ( 26 Aug 2023 10:59 )             36.7         137  |  100  |  10  ----------------------------<  109<H>  3.7   |  23  |  0.50    Ca    9.3      26 Aug 2023 10:59    TPro  7.6  /  Alb  4.2  /  TBili  <0.2  /  DBili  x   /  AST  13  /  ALT  17  /  AlkPhos  93      I&O's Detail      Urinalysis Basic - ( 26 Aug 2023 10:59 )    Color: x / Appearance: x / SG: x / pH: x  Gluc: 109 mg/dL / Ketone: x  / Bili: x / Urobili: x   Blood: x / Protein: x / Nitrite: x   Leuk Esterase: x / RBC: x / WBC x   Sq Epi: x / Non Sq Epi: x / Bacteria: x        RADIOLOGY & ADDITIONAL STUDIES:      Melanie Pan, PGY1 FRITZ ALDANA  37y  Female [id number]    HPI: 38 yo  LMP  presenting for management of cornual ectopic pregnancy. She has received two doses of methotrexate and is taking leucovorin rescue. Her trend is as follows    [id number]()->[id number]()->(MTX in Dr. Medina's office )->[phone]()->()-> MTX( in [facility] ED)    She denies vaginal bleeding and abdominal cramping. She has nausea but no vomiting. She denies dizziness, lightheadedness, chest pain, SOB, palpitations.        Name of GYN Physician: Dr Medina  OBHx: 2016 FT , SAB x1 6wk D&C requiring transfusion  GYNHx: denies fibroids, cysts, abnormal paps, STDs  PMH: denies  PSH: denies  Meds: Keflex  All: NKDA  Soc: no substance use, anxiety/depression      Vital Signs Last 24 Hrs  T(C): 36.9 (26 Aug 2023 12:51), Max: 36.9 (26 Aug 2023 09:39)  T(F): 98.4 (26 Aug 2023 12:51), Max: 98.4 (26 Aug 2023 09:39)  HR: 70 (26 Aug 2023 12:51) (70 - 73)  BP: 112/76 (26 Aug 2023 12:51) (109/71 - 112/76)  BP(mean): --  RR: 16 (26 Aug 2023 12:51) (16 - 18)  SpO2: 99% (26 Aug 2023 12:51) (99% - 99%)        Physical Exam:   General: sitting comftorably in bed, NAD   Abd: Soft, non-tender, non-distended.       LABS:               11.5   8.95  )-----------( 274      ( 26 Aug 2023 10:59 )             36.7     -    137  |  100  |  10  ----------------------------<  109<H>  3.7   |  23  |  0.50    Ca    9.3      26 Aug 2023 10:59    TPro  7.6  /  Alb  4.2  /  TBili  <0.2  /  DBili  x   /  AST  13  /  ALT  17  /  AlkPhos  93  08-    I&O's Detail      Urinalysis Basic - ( 26 Aug 2023 10:59 )    Color: x / Appearance: x / SG: x / pH: x  Gluc: 109 mg/dL / Ketone: x  / Bili: x / Urobili: x   Blood: x / Protein: x / Nitrite: x   Leuk Esterase: x / RBC: x / WBC x   Sq Epi: x / Non Sq Epi: x / Bacteria: x

## 2023-08-26 NOTE — ED ADULT TRIAGE NOTE - CCCP TRG CHIEF CMPLNT
methotrexate injection ,denies any pain discomfort or bleeding methotrexate injection ,denies any pain discomfort or vaginal bleeding

## 2023-08-26 NOTE — ED ADULT NURSE NOTE - NSFALLUNIVINTERV_ED_ALL_ED
Bed/Stretcher in lowest position, wheels locked, appropriate side rails in place/Call bell, personal items and telephone in reach/Instruct patient to call for assistance before getting out of bed/chair/stretcher/Non-slip footwear applied when patient is off stretcher/[place] to call system/Physically safe environment - no spills, clutter or unnecessary equipment/Purposeful proactive rounding/Room/bathroom lighting operational, light cord in reach

## 2023-08-26 NOTE — ED ADULT NURSE NOTE - OBJECTIVE STATEMENT
pt received to intake 6, aox4.  pt c/o "im here for my methotrexate".  pt denies pain and bleeding.  SL placed labs sent

## 2023-08-26 NOTE — ED PROVIDER NOTE - PATIENT PORTAL LINK FT
You can access the FollowMyHealth Patient Portal offered by [facility] by registering at the following website: [email or web address]. By joining [employer]’s FollowMyHealth portal, you will also be able to view your health information using other applications (apps) compatible with our system.

## 2023-08-26 NOTE — CONSULT NOTE ADULT - ASSESSMENT
38yo  LMP  presenting for 3rd dose of Methotrexate in setting of multidose MTX therapy for management of cornual ectopic pregnancy. Patient received first dose of MTX  outpt w/ Dr. Medina. Patients bHCG downtrending [id number]()->[id number]()->21206() and remaining labs within normal limits. Patient HCG decreased by 14.5% since prior. Patient to receive MTX on days 1,3,5,7 and Leucovorin on days 2,4,6,8 of the treatment cycle. Discussed with ED attending dose of Leucovorin is 0.1 mg/kg and patient to take medication orally, pharmacy to give 7.5mg. Patient to take leucovorin tomorrow at home  and return to ED  for repeat bHCG. If patients bHCG drops >15% from today's level, patient not to receive third dose of Methotrexate and is to then follow up bHCG weekly until level is noted to be <5. At that time patient is to stop taking Leucovorin as well.     - Pt informed not to have sexual intercourse, breast feed (if applicable), take NSAIDs/Aspirin, or drink alcohol while on this medication.  -Reviewed with patient the 15-20% methotrexate failure rate and possibility of necessity for additional dose of methotrexate.   -Consents for methotrexate signed.  -Patient given strict precautions to call her physician or return to ED if she experiences any severe abdominal pain, dizziness, lightheadedness, severe n/v, or any heavy vaginal bleeding >2 pads/hour for >2 hours.    -Patient instructed on need for follow up of b-hcg on   - Patient instructed to take leucovorin 7.5mg tomorrow  -Once patient receives methotrexate therapy she is cleared for discharge from OBGYN perspective.  Primary management per ED team.     Melanie Pan, PGY2  d/w Dr. Madison and Dr. Medina

## 2023-08-26 NOTE — ED ADULT NURSE REASSESSMENT NOTE - NS ED NURSE REASSESS COMMENT FT1
Pt discharged at this time. Ambulatory with steady gait upon discharge. Respirations even and unlabored. Pt well appearing, no acute distress noted upon leaving ED.

## 2023-08-26 NOTE — ED ADULT NURSE NOTE - CCCP TRG CHIEF CMPLNT
July 12, 2023       Raul Yun MD  [address] [facility] [address]  Via In Basket      Patient: Amparo Cisneros   YOB: 1985   Date of Visit: 7/12/2023       Dear Dr. Yun:    Thank you for referring Amparo Cisneros to me for evaluation. Below are my notes for this visit with her.    If you have questions, please do not hesitate to call me. I look forward to following your patient along with you.      Sincerely,        Trina Garcia CNP        CC: No Recipients  Trina Garcia CNP  7/12/2023  2:15 PM  Signed  Amparo Cisneros is a 38 year old female who is seen today for rectal pain  Chief Complaint   Patient presents with   • Rectal Bleeding       No known FH of CRC    No history of EGD or Colonoscopy      HPI      Patient presents with rectal pain  Has hx of hemorrhoids  11/14/2022: Lipectomy  11/30/2022: Chest pain/dyspnea. [place]. CTA: right lung PE. Started apixaban- now discontinued, last week  - heme managing hypercoagulable state, instructed to contact dept if any scheduled procedures or surgery      History of hemorrhoids x 15 years since childbirth  Notes intermittent flares of pain  - more often occurring after anal sex  Has rectal bleeding with stools and wiping for past several weeks  MARY with internal hemorrhoids and anal tags, no blood on glove    Had constipation in November after undergoing tummy tuck and butt lift  - was taking iron supplementation with black stools, not discontinued with brown bms  - hemorrhoids worse at that time, used laxative supp   Now bowel movements are 2-3x/daily, formed    Has good appetite, no nv  No heartburn      Patient recently discovered 7 week pregnant per US  Was seen d/t spotting and cramping  Was hi risk during last pregnancy  Planning for pregnancy termination July 17          Review of Systems   Constitutional: Negative for activity change, appetite change and unexpected weight change.   HENT: Negative for trouble swallowing.     Respiratory: Negative for cough, choking and shortness of breath.    Gastrointestinal: Positive for blood in stool and constipation. Negative for abdominal distention, abdominal pain, anal bleeding, diarrhea, nausea, rectal pain and vomiting.   Genitourinary: Positive for vaginal bleeding.        7 weeks pregnant   Skin: Negative for color change.   Neurological: Negative for dizziness, speech difficulty, weakness and light-headedness.   Hematological: Does not bruise/bleed easily.   Psychiatric/Behavioral: Negative for agitation, behavioral problems and confusion. The patient is not nervous/anxious.        Past Medical History:   Diagnosis Date   • Anxiety    • Asthma    • Depression    • Diabetes mellitus (CMD)    • Essential (primary) hypertension    • High cholesterol    • Hypothyroid    • Obesity    • Pulmonary embolism (CMD)    • Sleep apnea        Past Surgical History:   Procedure Laterality Date   • Abdomen surgery     •  section, classic     • Incision and drainage  2019    Gluteal abscess   • Lipectomy  2022       Family History   Problem Relation Age of Onset   • Hypertension Mother    • Diabetes Mother    • Hypertension Maternal Grandmother    • Diabetes Maternal Grandmother    • Myocardial Infarction Maternal Grandmother    • Cancer Maternal Grandfather        Social History     Socioeconomic History   • Marital status: Single     Spouse name: Not on file   • Number of children: Not on file   • Years of education: Not on file   • Highest education level: Not on file   Occupational History   • Occupation: unemployed   Tobacco Use   • Smoking status: Never   • Smokeless tobacco: Never   Vaping Use   • Vaping Use: never used   Substance and Sexual Activity   • Alcohol use: Not Currently   • Drug use: Never   • Sexual activity: Yes     Partners: Male     Birth control/protection: Condom   Other Topics Concern   • Not on file   Social History Narrative   • Not on file     Social  Determinants of Health     Financial Resource Strain: Not on file   Food Insecurity: Not on file   Transportation Needs: Not on file   Physical Activity: Not on file   Stress: Not on file   Social Connections: Not on file   Intimate Partner Violence: Not At Risk (7/11/2023)    Intimate Partner Violence    • Social Determinants: Intimate Partner Violence Past Fear: No    • Social Determinants: Intimate Partner Violence Current Fear: No       Visit Vitals  /83 (BP Location: LUE - Left upper extremity, Patient Position: Sitting, Cuff Size: Large Adult)   Pulse 73   Temp 98.3 °F (36.8 °C) (Oral)   Resp 18   Ht 5' 5\" (1.651 m)   Wt 83.4 kg (183 lb 13.8 oz)   LMP 05/22/2023   SpO2 98%   BMI 30.60 kg/m²       WBC (K/mcL)   Date Value   12/24/2022 3.7 (L)     RBC (mil/mcL)   Date Value   12/24/2022 4.21     HCT (%)   Date Value   12/24/2022 34.3 (L)     HGB (g/dL)   Date Value   12/24/2022 11.4 (L)     PLT (K/mcL)   Date Value   12/24/2022 266     Sodium (mmol/L)   Date Value   04/06/2023 141     Potassium (mmol/L)   Date Value   04/06/2023 3.8     Chloride (mmol/L)   Date Value   04/06/2023 111 (H)     Glucose (mg/dL)   Date Value   04/06/2023 137 (H)     Calcium (mg/dL)   Date Value   04/06/2023 8.9     Carbon Dioxide (mmol/L)   Date Value   04/06/2023 26     BUN (mg/dL)   Date Value   04/06/2023 11     Creatinine (mg/dL)   Date Value   04/06/2023 0.82     GOT/AST (Units/L)   Date Value   04/06/2023 26     GPT/ALT (Units/L)   Date Value   04/06/2023 47     Alkaline Phosphatase (Units/L)   Date Value   04/06/2023 70     Bilirubin, Total (mg/dL)   Date Value   04/06/2023 0.7     Iron (mcg/dL)   Date Value   12/08/2022 289 (H)         Physical Exam  Vitals reviewed.   Constitutional:       Appearance: Normal appearance.   Cardiovascular:      Pulses: Normal pulses.   Pulmonary:      Effort: Pulmonary effort is normal. No respiratory distress.   Abdominal:      General: Bowel sounds are normal.      Palpations: Abdomen  is soft.      Tenderness: There is no abdominal tenderness.   Genitourinary:     Rectum: Tenderness, external hemorrhoid and internal hemorrhoid present.   Musculoskeletal:         General: Normal range of motion.      Cervical back: Normal range of motion.   Skin:     General: Skin is warm and dry.   Neurological:      Mental Status: She is alert and oriented to person, place, and time.   Psychiatric:         Mood and Affect: Mood normal.         Behavior: Behavior normal.         Thought Content: Thought content normal.         Judgment: Judgment normal.         ASSESSMENT:   Hemorrhoids, unspecified hemorrhoid type  (primary encounter diagnosis)  Plan: SERVICE TO SURGERY COLORECTAL, hydroCORTisone         (ANUSOL-HC) 2.5 % rectal cream      Has hx of hemorrhoids  intermittent flares of pain  - more often occurring after anal sex  Has rectal bleeding with stools and wiping for past several weeks  MARY with internal hemorrhoids and anal tags, no blood on glove    Had constipation in November after undergoing tummy tuck and butt lift  - was taking iron supplementation with black stools, now discontinued with brown bms returning  - hemorrhoids worse at that time, used laxative supp   Now bowel movements are 2-3x/daily, formed    - discussed with patient to keep stools soft  - continue with adequate water and dietary fiber  - will order HYDROCORTISONE cream for patient to use for next 2 weeks  - discussed with patient having COLONOSCOPY completed d/t rectal bleeding  Unable to undergo at this time d/t 7 week pregnancy  Patient plans to terminate, scheduled for 7/17    - discussed the use of hemorrhoidal cream and colonoscopy can not be completed during pregnancy  Patient to notify office if after termination, no improvement in hemorrhoids are present with use of cream  At that time, will then have colonoscopy completed if patient wishes    Can also consider referral to surgical to discuss intervention regarding  hemorrhoids    Patient understands plan      Instructed to call with any questions or concerns  Thank you for allowing me to be involved in this patient's care.      Trina Garcia, HAMZAH  7/12/2023         methotrexate injection ,denies any pain discomfort or vaginal bleeding

## 2023-08-26 NOTE — ED PROVIDER NOTE - OBJECTIVE STATEMENT
37 year old female with known ectopic presents for rpeeat hcg, rec'd methotrexate 2 days ago. no abd pain, no bleeding. no complaints

## 2023-08-26 NOTE — ED PROVIDER NOTE - NSFOLLOWUPINSTRUCTIONS_ED_ALL_ED_FT
Take the medication prescribed tomorrow.  Return on 8/28 for a repeat blood test.    Return if you develop severe pain, pass out or have heavy bleeding

## 2023-08-28 ENCOUNTER — EMERGENCY (EMERGENCY)
Facility: HOSPITAL | Age: 38
LOS: 1 days | Discharge: HOME | End: 2023-08-28
Attending: STUDENT IN AN ORGANIZED HEALTH CARE EDUCATION/TRAINING PROGRAM | Admitting: STUDENT IN AN ORGANIZED HEALTH CARE EDUCATION/TRAINING PROGRAM
Payer: COMMERCIAL

## 2023-08-28 VITALS
DIASTOLIC BLOOD PRESSURE: 78 MMHG | RESPIRATION RATE: 18 BRPM | TEMPERATURE: 98 F | HEIGHT: 62 IN | SYSTOLIC BLOOD PRESSURE: 117 MMHG | HEART RATE: 80 BPM | OXYGEN SATURATION: 99 %

## 2023-08-28 VITALS
RESPIRATION RATE: 17 BRPM | TEMPERATURE: 98 F | HEART RATE: 71 BPM | SYSTOLIC BLOOD PRESSURE: 108 MMHG | DIASTOLIC BLOOD PRESSURE: 66 MMHG | OXYGEN SATURATION: 100 %

## 2023-08-28 LAB
ALBUMIN SERPL ELPH-MCNC: 4.2 G/DL — SIGNIFICANT CHANGE UP (ref 3.3–5)
ALP SERPL-CCNC: 98 U/L — SIGNIFICANT CHANGE UP (ref 40–120)
ALT FLD-CCNC: 20 U/L — SIGNIFICANT CHANGE UP (ref 4–33)
ANION GAP SERPL CALC-SCNC: 13 MMOL/L — SIGNIFICANT CHANGE UP (ref 7–14)
AST SERPL-CCNC: 16 U/L — SIGNIFICANT CHANGE UP (ref 4–32)
BASOPHILS # BLD AUTO: 0 K/UL — SIGNIFICANT CHANGE UP (ref 0–0.2)
BASOPHILS NFR BLD AUTO: 0 % — SIGNIFICANT CHANGE UP (ref 0–2)
BILIRUB SERPL-MCNC: 0.3 MG/DL — SIGNIFICANT CHANGE UP (ref 0.2–1.2)
BUN SERPL-MCNC: 10 MG/DL — SIGNIFICANT CHANGE UP (ref 7–23)
CALCIUM SERPL-MCNC: 9.5 MG/DL — SIGNIFICANT CHANGE UP (ref 8.4–10.5)
CHLORIDE SERPL-SCNC: 99 MMOL/L — SIGNIFICANT CHANGE UP (ref 98–107)
CO2 SERPL-SCNC: 24 MMOL/L — SIGNIFICANT CHANGE UP (ref 22–31)
CREAT SERPL-MCNC: 0.49 MG/DL — LOW (ref 0.5–1.3)
EGFR: 125 ML/MIN/1.73M2 — SIGNIFICANT CHANGE UP
EOSINOPHIL # BLD AUTO: 0.24 K/UL — SIGNIFICANT CHANGE UP (ref 0–0.5)
EOSINOPHIL NFR BLD AUTO: 2.6 % — SIGNIFICANT CHANGE UP (ref 0–6)
GLUCOSE SERPL-MCNC: 108 MG/DL — HIGH (ref 70–99)
HCG SERPL-ACNC: SIGNIFICANT CHANGE UP MIU/ML
HCT VFR BLD CALC: 37.4 % — SIGNIFICANT CHANGE UP (ref 34.5–45)
HGB BLD-MCNC: 11.7 G/DL — SIGNIFICANT CHANGE UP (ref 11.5–15.5)
HYPOCHROMIA BLD QL: SLIGHT — SIGNIFICANT CHANGE UP
IANC: 5.97 K/UL — SIGNIFICANT CHANGE UP (ref 1.8–7.4)
LYMPHOCYTES # BLD AUTO: 1.14 K/UL — SIGNIFICANT CHANGE UP (ref 1–3.3)
LYMPHOCYTES # BLD AUTO: 12.2 % — LOW (ref 13–44)
MANUAL SMEAR VERIFICATION: SIGNIFICANT CHANGE UP
MCHC RBC-ENTMCNC: 23.2 PG — LOW (ref 27–34)
MCHC RBC-ENTMCNC: 31.3 GM/DL — LOW (ref 32–36)
MCV RBC AUTO: 74.2 FL — LOW (ref 80–100)
MICROCYTES BLD QL: SIGNIFICANT CHANGE UP
MONOCYTES # BLD AUTO: 0.16 K/UL — SIGNIFICANT CHANGE UP (ref 0–0.9)
MONOCYTES NFR BLD AUTO: 1.7 % — LOW (ref 2–14)
NEUTROPHILS # BLD AUTO: 6.61 K/UL — SIGNIFICANT CHANGE UP (ref 1.8–7.4)
NEUTROPHILS NFR BLD AUTO: 69.6 % — SIGNIFICANT CHANGE UP (ref 43–77)
NEUTS BAND # BLD: 0.9 % — SIGNIFICANT CHANGE UP (ref 0–6)
PLAT MORPH BLD: NORMAL — SIGNIFICANT CHANGE UP
PLATELET # BLD AUTO: 286 K/UL — SIGNIFICANT CHANGE UP (ref 150–400)
PLATELET COUNT - ESTIMATE: NORMAL — SIGNIFICANT CHANGE UP
POLYCHROMASIA BLD QL SMEAR: SLIGHT — SIGNIFICANT CHANGE UP
POTASSIUM SERPL-MCNC: 4.1 MMOL/L — SIGNIFICANT CHANGE UP (ref 3.5–5.3)
POTASSIUM SERPL-SCNC: 4.1 MMOL/L — SIGNIFICANT CHANGE UP (ref 3.5–5.3)
PROT SERPL-MCNC: 7.7 G/DL — SIGNIFICANT CHANGE UP (ref 6–8.3)
RBC # BLD: 5.04 M/UL — SIGNIFICANT CHANGE UP (ref 3.8–5.2)
RBC # FLD: 14.4 % — SIGNIFICANT CHANGE UP (ref 10.3–14.5)
RBC BLD AUTO: NORMAL — SIGNIFICANT CHANGE UP
SODIUM SERPL-SCNC: 136 MMOL/L — SIGNIFICANT CHANGE UP (ref 135–145)
VARIANT LYMPHS # BLD: 13 % — HIGH (ref 0–6)
WBC # BLD: 9.38 K/UL — SIGNIFICANT CHANGE UP (ref 3.8–10.5)
WBC # FLD AUTO: 9.38 K/UL — SIGNIFICANT CHANGE UP (ref 3.8–10.5)

## 2023-08-28 PROCEDURE — 99285 EMERGENCY DEPT VISIT HI MDM: CPT

## 2023-08-28 NOTE — ED ADULT TRIAGE NOTE - ESI TRIAGE ACUITY LEVEL, MLM
GLENROY VAZQUEZ FROM [facility] CALLED AND INFORMED OF PT HAVING A SZ TODAY- LASTING MORE THAN 5 MINUTES- PT IS STABLE NOW AND GLENROY WAS TRANSFERRED TO CLINICAL TO K TO A CLINICAL MEMBER ABOUT PT.   
Provider: DR MAGUIRE    Caller: YURIDIA    Relationship to Patient: DENICE MARIN    Reason for Call: YURIDIA IS CALLING WANTING AN APPOINTMENT WITH DR MAGUIRE. THE ONLY THING THAT WAS AVAILABLE WAS NOT UNTIL JuNE 2022. YURIDIA  STATES THAT PATIENT HAD A SEIZURE TODAY, SHE HAD 2 IN AUGUST AND 1 IN July. AND SHE DID NOT THINK THAT GLORIA CAN WAIT THAT LONG TO BE SEEN.      PLEASE CALL YURIDIA TO DISCUSS.      
SPOKE TO LEMUEL. PT SCHEDULED. FYI PT NEEDED TO BE SEEN. SHE HAS HAD 5 SEIZURES IN THE LAST 3 MONTHS. INCREASE IN SEIZURE ACTIVITY.   
3

## 2023-08-28 NOTE — ED PROVIDER NOTE - CLINICAL SUMMARY MEDICAL DECISION MAKING FREE TEXT BOX
36yo 36yo F with cornual ectopic with multidose methotrexate here for rpt hcg and possible additional  methotrexate dose  denies symptoms at t hist supriya  will check labs, gyn recs

## 2023-08-28 NOTE — ED ADULT TRIAGE NOTE - HEIGHT IN INCHES
Past Medical History


Past Medical History:  Other


Additional Past Medical Histor:  peripheral edema


Past Surgical History:  Other


Additional Past Surgical Histo:  dilation and curratage


Alcohol Use:  None


Drug Use:  Marijuana





Adult General


Chief Complaint


Chief Complaint:  BACK PAIN OR INJURY





HPI


HPI





36 Procedures


[]





Dragon Disclaimer


Dragon 











JETT DOW Adrien 15, 2019 10:31


BOOM YOUSIF MD Apr 21, 2019 20:17
2

## 2023-08-28 NOTE — ED ADULT TRIAGE NOTE - STATUS:
Applied [Follow-Up Visit] : a follow-up [Urgent Visit] : an urgent  [FreeTextEntry2] : Breast Cancer/abd mass

## 2023-08-28 NOTE — ED PROVIDER NOTE - PATIENT PORTAL LINK FT
You can access the FollowMyHealth Patient Portal offered by [facility] by registering at the following website: [email or web address]. By joining [facility]’s FollowMyHealth portal, you will also be able to view your health information using other applications (apps) compatible with our system.

## 2023-08-28 NOTE — ED ADULT TRIAGE NOTE - CHIEF COMPLAINT QUOTE
Pt AOX4, is here for f/u 4th Methotrexate injection, was seen here 9/26 s/p ectopic pregnancy; pt has no pain, no bleeding Pt AOX4, is here for f/u 4th Methotrexate injection, was seen here 8/26 s/p ectopic pregnancy; pt has no pain, no bleeding

## 2023-08-28 NOTE — ED PROVIDER NOTE - OBJECTIVE STATEMENT
36yo  LMP  presenting for 4rd dose of Methotrexate in setting of multidose MTX therapy for management of cornual ectopic pregnancy. Patient received first dose of MTX  outpt w/ Dr. Medina. no abd pain or vaginal bleeding, pt does feel weak and a little dizzy though

## 2023-08-28 NOTE — ED PROVIDER NOTE - CARE PROVIDER_API CALL
Kyler Medina  Obstetrics and Gynecology  [address] [place] [address], [address]  [place] [address]  Phone: [phone]  Fax: [phone]  Scheduled Appointment: 09/04/2023

## 2023-08-28 NOTE — ED PROVIDER NOTE - NSFOLLOWUPINSTRUCTIONS_ED_ALL_ED_FT
1. TAKE ALL MEDICATIONS AS DIRECTED.    2. FOR PAIN OR FEVER YOU CAN TAKE ACETAMINOPHEN (TYLENOL) AS NEEDED, AS DIRECTED ON PACKAGING.  3. FOLLOW UP WITH YOUR PRIMARY DOCTOR WITHIN 5 DAYS AS DIRECTED.  4. IF YOU HAD LABS OR IMAGING DONE, YOU WERE GIVEN COPIES OF ALL LABS AND/OR IMAGING RESULTS FROM YOUR ER VISIT--PLEASE TAKE THEM WITH YOU TO YOUR FOLLOW UP APPOINTMENTS.  5. IF NEEDED, CALL PATIENT ACCESS SERVICES AT [phone] TO FIND A PRIMARY CARE PHYSICIAN.  OR CALL [phone] TO MAKE AN APPOINTMENT WITH THE CLINIC.  6. RETURN TO THE ER FOR ANY WORSENING SYMPTOMS OR CONCERNS.    Pt informed not to have sexual intercourse, breast feed (if applicable), take NSAIDs/Aspirin, or drink alcohol while on this medication.  Call her physician or return to ED if she experiences any severe abdominal pain, dizziness, lightheadedness, severe n/v, or any heavy vaginal bleeding >2 pads/hour for >2 hours.    Please follow up with Dr Montanez In 1 week          English    Ectopic Pregnancy    An ectopic pregnancy happens when a fertilized egg attaches (implants) outside the uterus. In a normal pregnancy, a fertilized egg implants in the uterus. An ectopic pregnancy cannot develop into a healthy baby. Most ectopic pregnancies occur in one of the fallopian tubes, which is where an egg travels from an ovary to get to the uterus. This is called a tubal pregnancy. An ectopic pregnancy can also happen on an ovary, on the cervix, or in the abdomen.    When a fertilized egg implants on tissue outside the uterus and begins to grow, it may cause the tissue to tear or burst. This is known as a ruptured ectopic pregnancy. The tear or burst causes internal bleeding. This may cause intense pain in the abdomen. An ectopic pregnancy is a medical emergency and can be life-threatening.    What are the causes?  The most common cause of this condition is damage to one of the fallopian tubes. A fallopian tube may be narrowed or blocked, and that stops the fertilized egg from reaching the uterus.    Sometimes, the cause of this condition is not known.    What increases the risk?  The following factors may make you more likely to develop this condition:  Having gone through infertility treatment before.  Having had an ectopic pregnancy before.  Having had surgery to have the fallopian tubes tied.  Becoming pregnant while using an intrauterine device for birth control.  Taking birth control pills before the age of 16.  Other risk factors include:  Smoking.  Alcohol use.  History of JEFFRY exposure. JEFFRY is a medicine that was used until 1971 and affected babies whose mothers took the medicine.  What are the signs or symptoms?  Common symptoms of this condition include:  Missing a menstrual period.  Nausea or tiredness.  Tender breasts.  Other normal pregnancy symptoms.  Other symptoms may include:  Pain during sex.  Vaginal bleeding or spotting.  Cramping or pain in the lower abdomen.  A fast heartbeat, low blood pressure, and sweating.  Pain or increased pressure while having a bowel movement.  Symptoms of a ruptured ectopic pregnancy and internal bleeding may include:  Sudden, severe pain in the abdomen.  Dizziness, weakness, feeling light-headed, or fainting.  Pain in the shoulder or neck area.  How is this diagnosed?  This condition is diagnosed by:  A blood test to check for the pregnancy hormone.  A pelvic exam to find painful areas or a mass in the abdomen.  Ultrasound. A probe is inserted into the vagina to see if there is a pregnancy in or outside the uterus.  Taking a sample of tissue from the uterus.  Surgery to look closely at the fallopian tubes through an incision in the abdomen.  How is this treated?  This condition is usually treated with medicine or surgery. Sometimes, ectopic pregnancies can resolve on their own, under close monitoring by your health care provider.    Medicine    A medicine called methotrexate may be given to cause the pregnancy tissue to be absorbed. The medicine may be given if:  The diagnosis is made early, with no signs of active bleeding.  The fallopian tube has not torn or burst.  You will need blood tests to make sure the medicine is working. It may take 4–6 weeks for the pregnancy tissues to be absorbed.    Surgery    Surgery may be performed to:  Remove the pregnancy tissue.  Stop internal bleeding.  Remove part or all of the fallopian tube.  Remove the uterus. This is rare.  After surgery, you may need to have blood tests to make sure the surgery worked.    Follow these instructions at home:  Medicines    Take over-the-counter and prescription medicines only as told by your health care provider.  Ask your health care provider if the medicine prescribed to you:  Requires you to avoid driving or using machinery.  Can cause constipation. You may need to take these actions to prevent or treat constipation:  Drink enough fluid to keep your urine pale yellow.  Take over-the-counter or prescription medicines.  Eat foods that are high in fiber, such as beans, whole grains, and fresh fruits and vegetables.  Limit foods that are high in fat and processed sugars, such as fried or sweet foods.  General instructions    Rest or limit your activity, if told by your health care provider.  Do not have sex or put anything in your vagina, such as tampons or douches, for 6 weeks or until your health care provider says it is safe.  Do not lift anything that is heavier than 10 lb (4.5 kg), or the limit that you are told, until your health care provider says that it is safe.  Return to your normal activities as told by your health care provider. Ask your health care provider what activities are safe for you.  Keep all follow-up visits. This is important.  Contact a health care provider if:  You have a fever or chills.  You have nausea and vomiting.  Get help right away if:  Your pain gets worse or is not relieved by medicine.  You feel dizzy or weak.  You feel light-headed or you faint.  You have sudden, severe pain in your abdomen.  You have sudden pain in the shoulder or neck area.  Summary  An ectopic pregnancy happens when a fertilized egg implants outside the uterus. Most ectopic pregnancies occur in one of the fallopian tubes.  An ectopic pregnancy is a medical emergency and can be life-threatening.  The most common cause of this condition is damage to one of the fallopian tubes.  This condition is usually treated with medicine or surgery. Some ectopic pregnancies resolve on their own, under close monitoring by your health care provider.  This information is not intended to replace advice given to you by your health care provider. Make sure you discuss any questions you have with your health care provider.    Document Revised: 03/30/2021 Document Reviewed: 03/30/2021  Elsevier Patient Education © 2023 Elsevier Inc.

## 2023-08-28 NOTE — CONSULT NOTE ADULT - ASSESSMENT
38yo  LMP  presenting for 3rd dose of Methotrexate in setting of multidose MTX therapy for management of cornual ectopic pregnancy. Patient received first dose of MTX  outpt w/ Dr. Medina. Patients bHCG downtrending 83552()->21525()->99397()->70423()->61057() and remaining labs within normal limits. Patient HCG decreased by 18.5% since prior. Since HCG decreased >15% patient does not require additional dose of MTX. Patient can stop leucovorin rescue as well.  - Will trend beta HCG weekly until beta HCG decreases to zero  - Patient to follow up in 1 week with Dr. Medina ([phone]) for repeat beta HCG  - Ectopic precautions reviewed with patient, including sevre abdominal pain not relieved with medications, nausea, vomiting, dizziness, lightheadedness, heavy vaginal bleeding. Discussed with patient importance of follow up for b-HCG given unknown pregnancy location at this time.  Patient expressed understanding.  All questions and concerns addressed to patient's apparent satisfaction.  - Patient stable for d/c home from GYN perspective.  Primary management per ED team.      Melanie Pan, PGY2  d/w Dr. Medina

## 2023-08-28 NOTE — CONSULT NOTE ADULT - SUBJECTIVE AND OBJECTIVE BOX
FRITZ ALDANA  37y  Female [id number]    HPI: 36 yo  LMP  presenting for management of cornual ectopic pregnancy. She has received three doses of methotrexate and is taking leucovorin rescue. Her trend is as follows    6248()->87990()->MTX ( in Dr. Medina's office)->12350()->()-> MTX( in [facility] ED) -> 79240 () -> MTX ( in [facility] ED)    She denies vaginal bleeding and abdominal cramping. She denies dizziness, lightheadedness, chest pain, SOB, palpitations, nausea, vomiting.      Name of GYN Physician: Dr Medina  OBHx: 2016 FT , SAB x1 6wk D&C requiring transfusion  GYNHx: denies fibroids, cysts, abnormal paps, STDs  PMH: denies  PSH: denies  Meds: Keflex  All: NKDA  Soc: no substance use, anxiety/depression      Vital Signs Last 24 Hrs  T(C): 36.7 (28 Aug 2023 09:10), Max: 36.7 (28 Aug 2023 09:10)  T(F): 98.1 (28 Aug 2023 09:10), Max: 98.1 (28 Aug 2023 09:10)  HR: 85 (28 Aug 2023 10:27) (80 - 85)  BP: 112/79 (28 Aug 2023 10:27) (112/79 - 117/78)  BP(mean): --  RR: 18 (28 Aug 2023 10:27) (18 - 18)  SpO2: 99% (28 Aug 2023 10:27) (99% - 99%)    Parameters below as of 28 Aug 2023 09:10  Patient On (Oxygen Delivery Method): room air        Physical Exam:   General: sitting comftorably in bed, NAD   Abd: Soft, non-tender, non-distended.        LABS:               11.7   9.38  )-----------( 286      ( 28 Aug 2023 10:06 )             37.4     08-    136  |  99  |  10  ----------------------------<  108<H>  4.1   |  24  |  0.49<L>    Ca    9.5      28 Aug 2023 10:06    TPro  7.7  /  Alb  4.2  /  TBili  0.3  /  DBili  x   /  AST  16  /  ALT  20  /  AlkPhos  98      I&O's Detail      Urinalysis Basic - ( 28 Aug 2023 10:06 )    Color: x / Appearance: x / SG: x / pH: x  Gluc: 108 mg/dL / Ketone: x  / Bili: x / Urobili: x   Blood: x / Protein: x / Nitrite: x   Leuk Esterase: x / RBC: x / WBC x   Sq Epi: x / Non Sq Epi: x / Bacteria: x

## 2023-09-04 ENCOUNTER — EMERGENCY (EMERGENCY)
Facility: HOSPITAL | Age: 38
LOS: 1 days | Discharge: HOME | End: 2023-09-04
Attending: EMERGENCY MEDICINE | Admitting: EMERGENCY MEDICINE
Payer: MEDICAID

## 2023-09-04 VITALS
SYSTOLIC BLOOD PRESSURE: 110 MMHG | DIASTOLIC BLOOD PRESSURE: 66 MMHG | TEMPERATURE: 98 F | HEIGHT: 62 IN | HEART RATE: 80 BPM | OXYGEN SATURATION: 100 % | RESPIRATION RATE: 18 BRPM

## 2023-09-04 VITALS
OXYGEN SATURATION: 100 % | SYSTOLIC BLOOD PRESSURE: 112 MMHG | DIASTOLIC BLOOD PRESSURE: 78 MMHG | RESPIRATION RATE: 18 BRPM | HEART RATE: 68 BPM | TEMPERATURE: 98 F

## 2023-09-04 LAB
ANION GAP SERPL CALC-SCNC: 11 MMOL/L — SIGNIFICANT CHANGE UP (ref 7–14)
BASOPHILS # BLD AUTO: 0.04 K/UL — SIGNIFICANT CHANGE UP (ref 0–0.2)
BASOPHILS NFR BLD AUTO: 0.4 % — SIGNIFICANT CHANGE UP (ref 0–2)
BUN SERPL-MCNC: 8 MG/DL — SIGNIFICANT CHANGE UP (ref 7–23)
CALCIUM SERPL-MCNC: 9.3 MG/DL — SIGNIFICANT CHANGE UP (ref 8.4–10.5)
CHLORIDE SERPL-SCNC: 104 MMOL/L — SIGNIFICANT CHANGE UP (ref 98–107)
CO2 SERPL-SCNC: 22 MMOL/L — SIGNIFICANT CHANGE UP (ref 22–31)
CREAT SERPL-MCNC: 0.45 MG/DL — LOW (ref 0.5–1.3)
EGFR: 128 ML/MIN/1.73M2 — SIGNIFICANT CHANGE UP
EOSINOPHIL # BLD AUTO: 0.63 K/UL — HIGH (ref 0–0.5)
EOSINOPHIL NFR BLD AUTO: 6.8 % — HIGH (ref 0–6)
GLUCOSE SERPL-MCNC: 105 MG/DL — HIGH (ref 70–99)
HCG SERPL-ACNC: 4471 MIU/ML — SIGNIFICANT CHANGE UP
HCT VFR BLD CALC: 36.3 % — SIGNIFICANT CHANGE UP (ref 34.5–45)
HGB BLD-MCNC: 11.4 G/DL — LOW (ref 11.5–15.5)
IANC: 5.87 K/UL — SIGNIFICANT CHANGE UP (ref 1.8–7.4)
IMM GRANULOCYTES NFR BLD AUTO: 0.3 % — SIGNIFICANT CHANGE UP (ref 0–0.9)
LYMPHOCYTES # BLD AUTO: 2.21 K/UL — SIGNIFICANT CHANGE UP (ref 1–3.3)
LYMPHOCYTES # BLD AUTO: 23.7 % — SIGNIFICANT CHANGE UP (ref 13–44)
MCHC RBC-ENTMCNC: 23.3 PG — LOW (ref 27–34)
MCHC RBC-ENTMCNC: 31.4 GM/DL — LOW (ref 32–36)
MCV RBC AUTO: 74.2 FL — LOW (ref 80–100)
MONOCYTES # BLD AUTO: 0.53 K/UL — SIGNIFICANT CHANGE UP (ref 0–0.9)
MONOCYTES NFR BLD AUTO: 5.7 % — SIGNIFICANT CHANGE UP (ref 2–14)
NEUTROPHILS # BLD AUTO: 5.87 K/UL — SIGNIFICANT CHANGE UP (ref 1.8–7.4)
NEUTROPHILS NFR BLD AUTO: 63.1 % — SIGNIFICANT CHANGE UP (ref 43–77)
NRBC # BLD: 0 /100 WBCS — SIGNIFICANT CHANGE UP (ref 0–0)
NRBC # FLD: 0 K/UL — SIGNIFICANT CHANGE UP (ref 0–0)
PLATELET # BLD AUTO: 296 K/UL — SIGNIFICANT CHANGE UP (ref 150–400)
POTASSIUM SERPL-MCNC: 4 MMOL/L — SIGNIFICANT CHANGE UP (ref 3.5–5.3)
POTASSIUM SERPL-SCNC: 4 MMOL/L — SIGNIFICANT CHANGE UP (ref 3.5–5.3)
RBC # BLD: 4.89 M/UL — SIGNIFICANT CHANGE UP (ref 3.8–5.2)
RBC # FLD: 14.7 % — HIGH (ref 10.3–14.5)
SODIUM SERPL-SCNC: 137 MMOL/L — SIGNIFICANT CHANGE UP (ref 135–145)
WBC # BLD: 9.31 K/UL — SIGNIFICANT CHANGE UP (ref 3.8–10.5)
WBC # FLD AUTO: 9.31 K/UL — SIGNIFICANT CHANGE UP (ref 3.8–10.5)

## 2023-09-04 PROCEDURE — 99285 EMERGENCY DEPT VISIT HI MDM: CPT

## 2023-09-04 NOTE — ED PROVIDER NOTE - OBJECTIVE STATEMENT
37-year-old female LMP June 16 status post methotrexate therapy for cornual ectopic pregnancy here for beta-hCG check.  She received her fourth dose of methotrexate on August 28 and is here to trend her hCG.  She denies abdominal pain vaginal bleeding near syncope palpitations shortness of breath.

## 2023-09-04 NOTE — ED ADULT NURSE NOTE - NSFALLUNIVINTERV_ED_ALL_ED
Bed/Stretcher in lowest position, wheels locked, appropriate side rails in place/Call bell, personal items and telephone in reach/Instruct patient to call for assistance before getting out of bed/chair/stretcher/Non-slip footwear applied when patient is off stretcher/[place] to call system/Physically safe environment - no spills, clutter or unnecessary equipment/Purposeful proactive rounding/Room/bathroom lighting operational, light cord in reach

## 2023-09-04 NOTE — ED PROVIDER NOTE - CLINICAL SUMMARY MEDICAL DECISION MAKING FREE TEXT BOX
37-year-old female known cornual ectopic pregnancy status post 4 doses of methotrexate here for hCG trend.  Pt hds, no complaints.  Will obtain quantitative hCG consult OB/GYN.

## 2023-09-04 NOTE — ED ADULT NURSE NOTE - OBJECTIVE STATEMENT
pt a&ox4 and ambulatory, comes to ED for follow up labs. pt received methotrexate last week. pt offers no medical complaints at this time. NAD noted, respirations even and nonlabored on RA. labs drawn and sent. pending results.

## 2023-09-04 NOTE — CONSULT NOTE ADULT - ASSESSMENT
38yo  LMP  presenting s/p 3rd dose of Methotrexate in setting of multidose MTX therapy for management of cornual ectopic pregnancy. Patient received first dose of MTX  outpt w/ Dr. Medina. Patients bHCG downtrending 26965()->84282()->02751()->96664()->45313()->4471 () and remaining labs within normal limits. Patient HCG decreased by 31% since prior. Since HCG decreased >15% patient does not require additional dose of MTX.     - Will trend beta HCG weekly until beta HCG decreases to zero  - Patient to follow up in 1 week with Dr. Medina ([phone]) for repeat beta HCG  - Ectopic precautions reviewed with patient, including sevre abdominal pain not relieved with medications, nausea, vomiting, dizziness, lightheadedness, heavy vaginal bleeding. Discussed with patient importance of follow up for b-HCG given unknown pregnancy location at this time.  Patient expressed understanding.  All questions and concerns addressed to patient's apparent satisfaction.  - Patient stable for d/c home from GYN perspective.  Primary management per ED team.      Apurva Brown PGY2   38yo  LMP  presenting s/p 3rd dose of Methotrexate in setting of multidose MTX therapy for management of cornual ectopic pregnancy. Patient received first dose of MTX  outpt w/ Dr. Medina. Patients bHCG downtrending 29381()->25405()->89484()->44753()->10540()->4471 () and remaining labs within normal limits. Patient HCG decreased by 69% since prior. Since HCG decreased >15% patient does not require additional dose of MTX.     - Will trend beta HCG weekly until beta HCG decreases to zero  - Patient to follow up in 1 week with Dr. Medina ([phone]) for repeat beta HCG  - Ectopic precautions reviewed with patient, including severe abdominal pain not relieved with medications, nausea, vomiting, dizziness, lightheadedness, heavy vaginal bleeding. Discussed with patient importance of follow up for b-HCG given unknown pregnancy location at this time.  Patient expressed understanding.  All questions and concerns addressed to patient's apparent satisfaction.  - Patient stable for d/c home from GYN perspective.  Primary management per ED team.    - recommend OTC Loratidine for itchiness, f/u with PCP      D/w Dr. Chi Brown PGY2

## 2023-09-04 NOTE — ED PROVIDER NOTE - PATIENT PORTAL LINK FT
You can access the FollowMyHealth Patient Portal offered by [facility] by registering at the following website: [email or web address]. By joining [facility]’s FollowMyHealth portal, you will also be able to view your health information using other applications (apps) compatible with our system.

## 2023-09-04 NOTE — CONSULT NOTE ADULT - SUBJECTIVE AND OBJECTIVE BOX
GYN Consult Note    FRITZ ALDANA  37y  Female [id number]    HPI:  36 yo  LMP  presenting for management of cornual ectopic pregnancy. She has received three doses of methotrexate and is no longer taking leucovorin rescue. Her trend is as follows    6248()->14753()->MTX ( in Dr. Medina's office)->05831()->06461()-> MTX( in [facility] ED) -> 24589 () -> MTX ( in [facility] ED) ->36226 () -> 4471 ()    She denies vaginal bleeding and abdominal cramping. She denies dizziness, lightheadedness, chest pain, SOB, palpitations, nausea, vomiting.      Name of GYN Physician: Dr Medina  OBHx: 2016 FT , SAB x1 6wk D&C requiring transfusion  GYNHx: denies fibroids, cysts, abnormal paps, STDs  PMH: denies  PSH: denies  Meds: Keflex  All: NKDA  Soc: no substance use, anxiety/depression      Vital Signs Last 24 Hrs  T(C): 36.8 (04 Sep 2023 12:15), Max: 36.9 (04 Sep 2023 09:38)  T(F): 98.3 (04 Sep 2023 12:15), Max: 98.5 (04 Sep 2023 09:38)  HR: 68 (04 Sep 2023 12:15) (68 - 80)  BP: 112/78 (04 Sep 2023 12:15) (110/66 - 112/78)  BP(mean): --  RR: 18 (04 Sep 2023 12:15) (18 - 18)  SpO2: 100% (04 Sep 2023 12:15) (100% - 100%)    Parameters below as of 04 Sep 2023 12:15  Patient On (Oxygen Delivery Method): room air        Physical Exam:   General: sitting comftorably in bed, NAD   Abd: Soft, non-tender, non-distended.      LABS:                              11.4   9.31  )-----------( 296      ( 04 Sep 2023 09:58 )             36.3     09-04    137  |  104  |  8   ----------------------------<  105<H>  4.0   |  22  |  0.45<L>    Ca    9.3      04 Sep 2023 09:58    [facility] [place]     I&O's Detail      Urinalysis Basic - ( 04 Sep 2023 09:58 )    Color: x / Appearance: x / SG: x / pH: x  Gluc: 105 mg/dL / Ketone: x  / Bili: x / Urobili: x   Blood: x / Protein: x / Nitrite: x   Leuk Esterase: x / RBC: x / WBC x   Sq Epi: x / Non Sq Epi: x / Bacteria: x       GYN Consult Note    FRITZ ALDANA  37y  Female [id number]    HPI:  38 yo  LMP  presenting for management of cornual ectopic pregnancy. She has received three doses of methotrexate and is no longer taking leucovorin rescue. Her trend is as follows    6248()->57736()->MTX ( in Dr. Medina's office)->12887()->13682()-> MTX( in [facility] ED) -> 43378 () -> MTX ( in [facility] ED) ->65046 () -> 4471 ()    She complains today of itchiness on her arms and legs, worse at night. She has not noticed any bug bites or marks on her extremities. She denies vaginal bleeding and abdominal cramping. She denies dizziness, lightheadedness, chest pain, SOB, palpitations, nausea, vomiting.      Name of GYN Physician: Dr Medina  OBHx: 2016 FT , SAB x1 6wk D&C requiring transfusion  GYNHx: denies fibroids, cysts, abnormal paps, STDs  PMH: denies  PSH: denies  Meds: Keflex  All: NKDA  Soc: no substance use, anxiety/depression      Vital Signs Last 24 Hrs  T(C): 36.8 (04 Sep 2023 12:15), Max: 36.9 (04 Sep 2023 09:38)  T(F): 98.3 (04 Sep 2023 12:15), Max: 98.5 (04 Sep 2023 09:38)  HR: 68 (04 Sep 2023 12:15) (68 - 80)  BP: 112/78 (04 Sep 2023 12:15) (110/66 - 112/78)  BP(mean): --  RR: 18 (04 Sep 2023 12:15) (18 - 18)  SpO2: 100% (04 Sep 2023 12:15) (100% - 100%)    Parameters below as of 04 Sep 2023 12:15  Patient On (Oxygen Delivery Method): room air        Physical Exam:   General: sitting comftorably in bed, NAD   Abd: Soft, non-tender, non-distended.    Ext: excoriations on arms and legs but no erythema or lesions    LABS:                              11.4   9.31  )-----------( 296      ( 04 Sep 2023 09:58 )             36.3     -    137  |  104  |  8   ----------------------------<  105<H>  4.0   |  22  |  0.45<L>    Ca    9.3      04 Sep 2023 09:58    [facility] [place]     I&O's Detail      Urinalysis Basic - ( 04 Sep 2023 09:58 )    Color: x / Appearance: x / SG: x / pH: x  Gluc: 105 mg/dL / Ketone: x  / Bili: x / Urobili: x   Blood: x / Protein: x / Nitrite: x   Leuk Esterase: x / RBC: x / WBC x   Sq Epi: x / Non Sq Epi: x / Bacteria: x

## 2023-09-13 ENCOUNTER — EMERGENCY (EMERGENCY)
Facility: HOSPITAL | Age: 38
LOS: 1 days | Discharge: HOME | End: 2023-09-13
Attending: EMERGENCY MEDICINE | Admitting: EMERGENCY MEDICINE
Payer: MEDICAID

## 2023-09-13 VITALS
HEART RATE: 85 BPM | OXYGEN SATURATION: 100 % | DIASTOLIC BLOOD PRESSURE: 77 MMHG | RESPIRATION RATE: 18 BRPM | TEMPERATURE: 98 F | SYSTOLIC BLOOD PRESSURE: 113 MMHG

## 2023-09-13 VITALS
TEMPERATURE: 98 F | OXYGEN SATURATION: 100 % | HEIGHT: 62 IN | DIASTOLIC BLOOD PRESSURE: 77 MMHG | HEART RATE: 81 BPM | SYSTOLIC BLOOD PRESSURE: 115 MMHG | RESPIRATION RATE: 18 BRPM

## 2023-09-13 LAB
HCG SERPL-ACNC: 1129 MIU/ML — SIGNIFICANT CHANGE UP
HCT VFR BLD CALC: 36.7 % — SIGNIFICANT CHANGE UP (ref 34.5–45)
HGB BLD-MCNC: 11.6 G/DL — SIGNIFICANT CHANGE UP (ref 11.5–15.5)
MCHC RBC-ENTMCNC: 23.3 PG — LOW (ref 27–34)
MCHC RBC-ENTMCNC: 31.6 GM/DL — LOW (ref 32–36)
MCV RBC AUTO: 73.7 FL — LOW (ref 80–100)
NRBC # BLD: 0 /100 WBCS — SIGNIFICANT CHANGE UP (ref 0–0)
NRBC # FLD: 0 K/UL — SIGNIFICANT CHANGE UP (ref 0–0)
PLATELET # BLD AUTO: 331 K/UL — SIGNIFICANT CHANGE UP (ref 150–400)
RBC # BLD: 4.98 M/UL — SIGNIFICANT CHANGE UP (ref 3.8–5.2)
RBC # FLD: 14.9 % — HIGH (ref 10.3–14.5)
WBC # BLD: 8.27 K/UL — SIGNIFICANT CHANGE UP (ref 3.8–10.5)
WBC # FLD AUTO: 8.27 K/UL — SIGNIFICANT CHANGE UP (ref 3.8–10.5)

## 2023-09-13 PROCEDURE — 99284 EMERGENCY DEPT VISIT MOD MDM: CPT

## 2023-09-13 PROCEDURE — 76830 TRANSVAGINAL US NON-OB: CPT | Mod: 26

## 2023-09-13 NOTE — ED ADULT NURSE NOTE - OBJECTIVE STATEMENT
Patient is a 36 yo female, denies phx, presenting for repeat hcg after receiving methotrexate for ectopic pregnancy 1 week ago. AAOx4, no signs of distress, denies abdominal pain, nausea, vomiting, fever. Endorsing small amount of vaginal bleeding. Labs sent per orders. Pending TVUS. Fall precautions maintained.

## 2023-09-13 NOTE — ED ADULT TRIAGE NOTE - CHIEF COMPLAINT QUOTE
Pt AOX4 returns for check up s/p miscarriage and Methotrexate administered here 1 week ago.  Pt states bleeding has stopped, denies pain

## 2023-09-13 NOTE — ED PROVIDER NOTE - PATIENT PORTAL LINK FT
You can access the FollowMyHealth Patient Portal offered by [facility] by registering at the following website: [email or web address]. By joining [employer]’s FollowMyHealth portal, you will also be able to view your health information using other applications (apps) compatible with our system.

## 2023-09-13 NOTE — CONSULT NOTE ADULT - ASSESSMENT
36yo  LMP  presenting for repeat bHCG s/p 3 doses of Methotrexate in setting of multidose MTX therapy for management of cornual ectopic pregnancy. Patient received first dose of MTX  outpt w/ Dr. Medina. Patients bHCG downtrending 56300()->35355()->51018()->20293()->87535()->4471 ()->1129() and remaining labs within normal limits. Patient HCG decreased by 74.75% since prior. Since HCG decreased >15% patient does not require additional dose of MTX. Repeat TVUS today per ED showing persistent gestational sac in the uterus, without evidence of cardiac activity, likely aborting cornual ectopic. Patient denies current vaginal bleeding or abdominal pain, remaining labs within normal limits.     - Will trend beta HCG weekly until beta HCG decreases to zero, no need for repeat TVUS unless patient presents with sx of severe abdominal pain or associated sx  - Patient to follow up in 1 week with Dr. Medina ([phone]) for repeat beta HCG  - Ectopic precautions reviewed with patient, including severe abdominal pain not relieved with medications, nausea, vomiting, dizziness, lightheadedness, heavy vaginal bleeding. Discussed with patient importance of follow up for b-HCG. Patient expressed understanding.  All questions and concerns addressed to patient's apparent satisfaction.  - Patient stable for d/c home from GYN perspective.    - Remaining care per ED team.        GILA Rosario  d/w Dr. Chiang

## 2023-09-13 NOTE — ED PROVIDER NOTE - CLINICAL SUMMARY MEDICAL DECISION MAKING FREE TEXT BOX
Patient presents emergency department due to entire pregnancy with a possible department for repeat methotrexate.  Patient states bleeding has improved this point.  OB has been evaluated patient agrees with plan for discharge states no other medical intervention at this point in pregnancy will Absorb itself. No no hemoglobin drop.  Patient well-appearing no signs of respiratory distress.  Medically stable otherwise.  Stable for DC

## 2023-09-13 NOTE — ED ADULT NURSE NOTE - NSFALLUNIVINTERV_ED_ALL_ED
Bed/Stretcher in lowest position, wheels locked, appropriate side rails in place/Call bell, personal items and telephone in reach/Instruct patient to call for assistance before getting out of bed/chair/stretcher/Non-slip footwear applied when patient is off stretcher/[place] to call system/Physically safe environment - no spills, clutter or unnecessary equipment/Purposeful proactive rounding/Room/bathroom lighting operational, light cord in reach

## 2023-09-13 NOTE — ED PROVIDER NOTE - OBJECTIVE STATEMENT
37-year-old female presents emergency department for repeat hCG and ultrasound secondary to 20 miscarriage ectopic pregnancy.  Patient states had vaginal bleeding with symptomatic improvement this point.  Patient denies any other symptoms aside for improvement including intermittent episodes of shortness of breath after long exertion.  Patient has no other acute complaints including no abdominal pain.

## 2023-09-13 NOTE — CONSULT NOTE ADULT - SUBJECTIVE AND OBJECTIVE BOX
FRITZ ALDANA  37y  Female [id number]    HPI:        Name of GYN Physician:   OBHx:    GynHx: Denies fibroids, cysts, endometriosis, STI's, Abnormal pap smears   PMH:  PSH:  Meds:  Allx:  Social History:  Denies smoking use, drug use, alcohol use.   +occasional social alcohol use    Vital Signs Last 24 Hrs  T(C): 36.7 (13 Sep 2023 08:41), Max: 36.7 (13 Sep 2023 08:41)  T(F): 98 (13 Sep 2023 08:41), Max: 98 (13 Sep 2023 08:41)  HR: 81 (13 Sep 2023 08:41) (81 - 81)  BP: 115/77 (13 Sep 2023 08:41) (115/77 - 115/77)  BP(mean): --  RR: 18 (13 Sep 2023 08:41) (18 - 18)  SpO2: 100% (13 Sep 2023 08:41) (100% - 100%)    Parameters below as of 13 Sep 2023 08:41  Patient On (Oxygen Delivery Method): room air        Physical Exam:   General: sitting comfortably in bed, NAD   HEENT: neck supple, full ROM  CV: RRR  Lungs: CTA b/l, good air flow b/l   Back: No CVA tenderness  Abd: Soft, non-tender, non-distended.  Bowel sounds present.    :  No bleeding on pad.    External labia wnl.  Bimanual exam with no cervical motion tenderness, uterus wnl, adnexa non palpable b/l.  Cervix closed vs. Cervix dilated  Speculum Exam: No active bleeding from os.  Physiologic discharge.    Ext: non-tender b/l, no edema     LABS:                              11.6   8.27  )-----------( 331      ( 13 Sep 2023 09:19 )             36.7           I&O's Detail          RADIOLOGY & ADDITIONAL STUDIES:     FRITZ ALDANA  37y  Female [id number]    HPI: 38 yo  LMP  presenting for management of cornual ectopic pregnancy. She has received three doses of methotrexate and is no longer taking leucovorin rescue. Her trend is as follows    6248()->98275()->MTX ( in Dr. Medina's office)->33630()->64687()-> MTX( in [facility] ED) -> 36634 () -> MTX ( in [facility] ED) ->36517 () -> 4471 () -> 1129()    She has no complaints today. States she had a menstrual cycle that lasted for 4 days from -9/10. She denies current vaginal bleeding or abdominal pain. She denies dizziness, lightheadedness, chest pain, SOB, palpitations, nausea, vomiting.      Name of GYN Physician: Dr Medina  OBHx: 2016 FT , SAB x1 6wk D&C requiring transfusion  GYNHx: denies fibroids, cysts, abnormal paps, STDs  PMH: denies  PSH: denies  Meds: Keflex  All: NKDA    Vital Signs Last 24 Hrs  T(C): 36.7 (13 Sep 2023 08:41), Max: 36.7 (13 Sep 2023 08:41)  T(F): 98 (13 Sep 2023 08:41), Max: 98 (13 Sep 2023 08:41)  HR: 81 (13 Sep 2023 08:41) (81 - 81)  BP: 115/77 (13 Sep 2023 08:41) (115/77 - 115/77)  BP(mean): --  RR: 18 (13 Sep 2023 08:41) (18 - 18)  SpO2: 100% (13 Sep 2023 08:41) (100% - 100%)    Parameters below as of 13 Sep 2023 08:41  Patient On (Oxygen Delivery Method): room air        Physical Exam:   General: sitting comfortably in bed, NAD   HEENT: neck supple, full ROM  CV: RRR  Lungs: CTA b/l, good air flow b/l   Back: No CVA tenderness  Abd: Soft, non-tender, non-distended.   :  No bleeding on pad.       LABS:                        11.6   8.27  )-----------( 331      ( 13 Sep 2023 09:19 )             36.7               RADIOLOGY & ADDITIONAL STUDIES:    < from: US Transvaginal (23 @ 10:15) >  ACC: [id number] EXAM:  US TRANSVAGINAL   ORDERED BY: ANGELA SALDIVAR     PROCEDURE DATE:  2023          INTERPRETATION:  CLINICAL INFORMATION: Vaginal bleeding and recent   miscarriage. Evaluate for retained products of conception.    LMP: 2023    COMPARISON: Pelvic ultrasound    TECHNIQUE:  Endovaginal pelvic sonogram only.    FINDINGS:  Uterus: 7.9 cm x 4.4 cm x 5.0 cm. Irregularly marginated thick walled   gestational sac in the uterus measuring 2.3 x 1.5 x 1.2 cm demonstrating   the presence of a fetal pole without heartbeat.  Endometrium: 3 mm. Within normal limits.    Right ovary: 3.3 cm x 1.1 cm x 2.5 cm. Within normal limits.  Left ovary: 2.8 cm x 1.0 cm x 2.2 cm. Within normal limits. Hemorrhagic   corpus luteum measuring 1.3 x 1.6 x 0.7 cm.    Fluid: None.    IMPRESSION:  *  Persistent gestational sac in the uterus, without evidence of cardiac   activity, consistent with failed pregnancy.  *  Hemorrhagic corpus luteum left ovary.    --- End of Report ---      ROWAN VILLEGAS MD; Resident Radiologist  This document has been electronically signed.  KATIA PASTRANA MD; Attending Radiologist  This document has been electronically signed. Sep 13 2023 11:37AM    < end of copied text >

## 2023-09-20 ENCOUNTER — EMERGENCY (EMERGENCY)
Facility: HOSPITAL | Age: 38
LOS: 1 days | Discharge: HOME | End: 2023-09-20
Attending: EMERGENCY MEDICINE | Admitting: EMERGENCY MEDICINE
Payer: MEDICAID

## 2023-09-20 VITALS
RESPIRATION RATE: 20 BRPM | OXYGEN SATURATION: 99 % | SYSTOLIC BLOOD PRESSURE: 119 MMHG | DIASTOLIC BLOOD PRESSURE: 78 MMHG | TEMPERATURE: 98 F | HEIGHT: 62 IN | HEART RATE: 77 BPM

## 2023-09-20 VITALS
HEART RATE: 87 BPM | SYSTOLIC BLOOD PRESSURE: 112 MMHG | OXYGEN SATURATION: 99 % | RESPIRATION RATE: 18 BRPM | TEMPERATURE: 98 F | DIASTOLIC BLOOD PRESSURE: 72 MMHG

## 2023-09-20 PROBLEM — O00.90 UNSPECIFIED ECTOPIC PREGNANCY WITHOUT INTRAUTERINE PREGNANCY: Chronic | Status: ACTIVE | Noted: 2023-09-13

## 2023-09-20 LAB — HCG SERPL-ACNC: 419.9 MIU/ML — SIGNIFICANT CHANGE UP

## 2023-09-20 PROCEDURE — 76830 TRANSVAGINAL US NON-OB: CPT | Mod: 26

## 2023-09-20 PROCEDURE — 99284 EMERGENCY DEPT VISIT MOD MDM: CPT

## 2023-09-20 NOTE — ED PROVIDER NOTE - NSFOLLOWUPINSTRUCTIONS_ED_ALL_ED_FT
Follow with Dr. Medina ([phone]) for continued care. Follow with Dr. Medina ([phone]) for repeat HCG test.    Rerurn if heavy vaginal bleeding or abdominal pain.    If unable to get labs from Dr. Medina, then come to ER for repeat HCG test. Follow with Dr. Medina ([phone]) for repeat HCG test.    Rerurn if heavy vaginal bleeding or abdominal pain.    If unable to get labs from Dr. Medina, then come to ER for repeat HCG test in 1 week.

## 2023-09-20 NOTE — CONSULT NOTE ADULT - ASSESSMENT
38yo  LMP  presenting for repeat bHCG s/p 3 doses of Methotrexate in setting of multidose MTX therapy for management of cornual ectopic pregnancy. Patient received first dose of MTX  outpt w/ Dr. Medina. Patients bHCG downtrending 50286()->62570()->11135()->80107()->31489()->4471 ()->1129()->419(). Patient HCG decreased by 62.8% since prior. Since HCG decreased >15% patient does not require additional dose of MTX. Repeat TVUS today per ED showing     - Will trend beta HCG weekly until beta HCG decreases to zero, no need for repeat TVUS unless patient presents with sx of severe abdominal pain or associated sx  - Patient to follow up in 1 week with Dr. Medina ([phone]) for repeat beta HCG  - Ectopic precautions reviewed with patient, including severe abdominal pain not relieved with medications, nausea, vomiting, dizziness, lightheadedness, heavy vaginal bleeding. Discussed with patient importance of follow up for b-HCG. Patient expressed understanding.  All questions and concerns addressed to patient's apparent satisfaction.  - Patient stable for d/c home from GYN perspective.    - Remaining care per ED team.        GILA Rosario  d/w Dr. Chiang    36yo  LMP  presenting for repeat bHCG s/p 3 doses of Methotrexate in setting of multidose MTX therapy for management of cornual ectopic pregnancy. Patient received first dose of MTX  outpt w/ Dr. Medina. Patients bHCG downtrending 21737()->00656()->85533()->60623()->38511()->4471 ()->1129()->419(). Patient HCG decreased by 62.8% since prior. Since HCG decreased >15% patient does not require additional dose of MTX. Repeat TVUS today per ED showing     - Will trend beta HCG weekly until beta HCG decreases to zero, no need for repeat TVUS unless patient presents with sx of severe abdominal pain or associated sx  - Patient to follow up in 1 week with Dr. Medina ([phone]) for repeat beta HCG. Patient strongly encouraged to reach out to outpatient office for lab script to have repeat bHCG outpatient.   - Ectopic precautions reviewed with patient, including severe abdominal pain not relieved with medications, nausea, vomiting, dizziness, lightheadedness, heavy vaginal bleeding. Discussed with patient importance of follow up for b-HCG. Patient expressed understanding.  All questions and concerns addressed to patient's apparent satisfaction.  - Patient stable for d/c home from GYN perspective.    - Remaining care per ED team.        GILA Rosario  d/w Dr. Chiang

## 2023-09-20 NOTE — ED PROVIDER NOTE - OBJECTIVE STATEMENT
Perlita: Took MTX for cornual ectopic. Seen last wk. here. Here for HCG and TVUS check. Minimal bleeding. No pain. Rh positive.

## 2023-09-20 NOTE — ED PROVIDER NOTE - CARE PLAN
1 Principal Discharge DX:	Miscarriage   Principal Discharge DX:	Laboratory test  Secondary Diagnosis:	Ectopic pregnancy

## 2023-09-20 NOTE — ED PROVIDER NOTE - PATIENT PORTAL LINK FT
You can access the FollowMyHealth Patient Portal offered by [facility] by registering at the following website: [email or web address]. By joining [employer]’s FollowMyHealth portal, you will also be able to view your health information using other applications (apps) compatible with our system.

## 2023-09-20 NOTE — CONSULT NOTE ADULT - SUBJECTIVE AND OBJECTIVE BOX
FRITZ AKTER  38y  Female [id number]    HPI: 36 yo  LMP  presenting for management of cornual ectopic pregnancy. She has received three doses of methotrexate and is no longer taking leucovorin rescue. Her trend is as follows  6248()->65175()->MTX ( in Dr. Medina's office)->89749()->04919()-> MTX( in [facility] ED) -> 02156 () -> MTX ( in [facility] ED) ->90153 () -> 4471 () -> 1129() -> 419.9()  She has no complaints today. She denies current vaginal bleeding or abdominal pain. She denies dizziness, lightheadedness, chest pain, SOB, palpitations, nausea, vomiting.      Name of GYN Physician: Dr Medina  OBHx: 2016 FT , SAB x1 6wk D&C requiring transfusion  GYNHx: denies fibroids, cysts, abnormal paps, STDs  PMH: denies  PSH: denies  Meds: Denies  All: NKDA    Vital Signs Last 24 Hrs  T(C): 36.6 (20 Sep 2023 10:29), Max: 36.6 (20 Sep 2023 08:41)  T(F): 97.9 (20 Sep 2023 10:29), Max: 97.9 (20 Sep 2023 10:29)  HR: 87 (20 Sep 2023 10:29) (77 - 87)  BP: 112/72 (20 Sep 2023 10:29) (112/72 - 119/78)  BP(mean): --  RR: 18 (20 Sep 2023 10:29) (18 - 20)  SpO2: 99% (20 Sep 2023 10:29) (99% - 99%)    Parameters below as of 20 Sep 2023 08:41  Patient On (Oxygen Delivery Method): room air        Physical Exam:   General: sitting comfortably in bed, NAD   HEENT: neck supple, full ROM  CV: RRR  Lungs: CTA b/l, good air flow b/l   Back: No CVA tenderness  Abd: Soft, non-tender, non-distended.  Bowel sounds present.    :  No bleeding on pad.                RADIOLOGY & ADDITIONAL STUDIES:     FRITZ AKTER  38y  Female [id number]    HPI: 36 yo  LMP  presenting for management of cornual ectopic pregnancy. She has received three doses of methotrexate and is no longer taking leucovorin rescue. Her trend is as follows  6248()->37257()->MTX ( in Dr. Medina's office)->75852()->44512()-> MTX( in [facility] ED) -> 20772 () -> MTX ( in [facility] ED) ->05730 () -> 4471 () -> 1129() -> 419.9()  She has no complaints today. She denies current vaginal bleeding or abdominal pain. She denies dizziness, lightheadedness, chest pain, SOB, palpitations, nausea, vomiting.      Name of GYN Physician: Dr Medina  OBHx: 2016 FT , SAB x1 6wk D&C requiring transfusion  GYNHx: denies fibroids, cysts, abnormal paps, STDs  PMH: denies  PSH: denies  Meds: Denies  All: NKDA    Vital Signs Last 24 Hrs  T(C): 36.6 (20 Sep 2023 10:29), Max: 36.6 (20 Sep 2023 08:41)  T(F): 97.9 (20 Sep 2023 10:29), Max: 97.9 (20 Sep 2023 10:29)  HR: 87 (20 Sep 2023 10:29) (77 - 87)  BP: 112/72 (20 Sep 2023 10:29) (112/72 - 119/78)  BP(mean): --  RR: 18 (20 Sep 2023 10:29) (18 - 20)  SpO2: 99% (20 Sep 2023 10:29) (99% - 99%)    Parameters below as of 20 Sep 2023 08:41  Patient On (Oxygen Delivery Method): room air        Physical Exam:   General: sitting comfortably in bed, NAD   HEENT: neck supple, full ROM  CV: RRR  Lungs: CTA b/l, good air flow b/l   Back: No CVA tenderness  Abd: Soft, non-tender, non-distended.    :  No bleeding on pad.                RADIOLOGY & ADDITIONAL STUDIES:

## 2023-09-20 NOTE — ED ADULT NURSE NOTE - OBJECTIVE STATEMENT
received patient to intake for +hCG, pt had methotrexate injection at [facility] last week, needs repeat blood work. C/o some vaginal spotting at this time, offers no other complaints, butterflied for labs

## 2023-09-27 ENCOUNTER — EMERGENCY (EMERGENCY)
Facility: HOSPITAL | Age: 38
LOS: 1 days | Discharge: HOME | End: 2023-09-27
Attending: STUDENT IN AN ORGANIZED HEALTH CARE EDUCATION/TRAINING PROGRAM | Admitting: STUDENT IN AN ORGANIZED HEALTH CARE EDUCATION/TRAINING PROGRAM
Payer: MEDICAID

## 2023-09-27 VITALS
TEMPERATURE: 98 F | DIASTOLIC BLOOD PRESSURE: 68 MMHG | SYSTOLIC BLOOD PRESSURE: 123 MMHG | HEART RATE: 72 BPM | OXYGEN SATURATION: 100 % | HEIGHT: 62 IN | RESPIRATION RATE: 18 BRPM

## 2023-09-27 VITALS
SYSTOLIC BLOOD PRESSURE: 99 MMHG | DIASTOLIC BLOOD PRESSURE: 59 MMHG | RESPIRATION RATE: 18 BRPM | OXYGEN SATURATION: 99 % | HEART RATE: 63 BPM | TEMPERATURE: 98 F

## 2023-09-27 LAB — HCG SERPL-ACNC: 132.2 MIU/ML — SIGNIFICANT CHANGE UP

## 2023-09-27 PROCEDURE — 99284 EMERGENCY DEPT VISIT MOD MDM: CPT

## 2023-09-27 NOTE — CONSULT NOTE ADULT - ASSESSMENT
39yo  LMP  presenting for repeat bHCG s/p 3 doses of Methotrexate in setting of multidose MTX therapy for management of cornual ectopic pregnancy. Patient received first dose of MTX  outpt w/ Dr. Medina. Patients bHCG downtrending 69118()->43942()->12471()->68318()->55613()->4471 ()->1129()->419()->132(). Patient HCG decreased by 68.5% since prior. Since HCG decreased >15% patient does not require additional dose of MTX.     - Will trend beta HCG weekly until beta HCG decreases to zero, no need for repeat TVUS unless patient presents with sx of severe abdominal pain or associated sx  - Patient to follow up in 1 week for repeat beta HCG. Patient strongly encouraged to reach out to outpatient office for lab script to have repeat bHCG outpatient.   - Ectopic precautions reviewed with patient, including severe abdominal pain not relieved with medications, nausea, vomiting, dizziness, lightheadedness, heavy vaginal bleeding. Discussed with patient importance of follow up for b-HCG. Patient expressed understanding.  All questions and concerns addressed to patient's apparent satisfaction.  - Patient stable for d/c home from GYN perspective.    - Remaining care per ED team.        GILA Rosario  d/w Dr. Gunn

## 2023-09-27 NOTE — ED PROVIDER NOTE - PATIENT PORTAL LINK FT
You can access the FollowMyHealth Patient Portal offered by [facility] by registering at the following website: [email or web address]. By joining [employer]’s FollowMyHealth portal, you will also be able to view your health information using other applications (apps) compatible with our system.

## 2023-09-27 NOTE — ED ADULT NURSE NOTE - OBJECTIVE STATEMENT
Patient received to intake room 7 A&Ox4, ambulatory states to have had methotrexate and is coming to the ED for follow up HCG testing. Endorsing mild vaginal spotting. Denies dizziness, chest pain, SOB, abdominal pain headache at this time. RR equal and unlabored. Butterflied for labs. Labs drawn and sent. Care plan continued. Comfort measures provided. Safety maintained. Awaiting lab results.

## 2023-09-27 NOTE — ED PROVIDER NOTE - OBJECTIVE STATEMENT
38-year-old female no significant past medical history diagnosed with ectopic status post methotrexate x3 doses.  Patient with overall downtrending hCG returns to ED for 1 week follow-up since last hCG.  Patient denies fever, chills, chest pain, shortness of breath, abdominal pain, nausea vomiting.  She denies lower abdominal pain.  She reports minimal occasional vaginal spotting which has been gradually slowing since her prior visit.  Patient reports previously seen by Dr. Millard however insurance is no longer excepted and therefore she represents to ED.  Prior GYN consult note reviewed.

## 2023-09-27 NOTE — ED PROVIDER NOTE - CLINICAL SUMMARY MEDICAL DECISION MAKING FREE TEXT BOX
Patient with ectopic pregnancy status post methotrexate with downtrending hCG Jessica presents to ED for repeat hCG.  Patient without significant vaginal bleeding or abdominal pain, per prior GYN note only hCG required.  Plan hCG, reassess

## 2023-09-27 NOTE — ED PROVIDER NOTE - PROGRESS NOTE DETAILS
hCG downtrending, GYN to see patient in ED Patient seen by GYN in ED.  No acute intervention at this time.  Patient feeling well.  Denies any symptoms.  GYN recommending patient return to ED for repeat hCG at some point in the next week patient currently does not have private GYN secondary to insurance issues.  Strict return precautions with patient at bedside she has no standing. Patient seen by GYN in ED.  No acute intervention at this time.  Patient feeling well.  Denies any symptoms.  GYN recommending patient return to ED for repeat hCG at some point in the next week patient currently does not have private GYN secondary to insurance issues.  Strict return precautions with patient at bedside she expressed understanding.

## 2023-09-27 NOTE — ED ADULT TRIAGE NOTE - CHIEF COMPLAINT QUOTE
Pt presents to ED ambulatory from home for follow up. Pt received methotrexate 1 month ago and was advised to come to ED for follow up. Pt endorses vaginal spotting.

## 2023-09-27 NOTE — ED PROVIDER NOTE - PHYSICAL EXAMINATION
GEN: no acute respiratory distress. nontoxic, speaking comfortably in full sentences, ambulating with steady gait.  HEENT: NCAT. face symmetrical. PERRL 4mm, EOMI, MMM, oropharynx wnl.  Neck: no JVD, trachea midline, no lymphadenopathy, FROM  CV: RRR. +S1S2, no murmur. 2+ pulses in 4 extremities, cap refill <2 sec  Chest: CTA B/l. no wheezing, rales, rhonchi. no retractions. good air movement.  ABD: +BS, soft, non distended, non tender.   MSK: No clubbing, cyanosis, edema. FROM of all extremities. no tenderness to palpation. No midline or paraspinal tenderness. .  Neuro: AAOX3.  Sensation and motor grossly intact.

## 2023-09-27 NOTE — ED PROVIDER NOTE - NSFOLLOWUPINSTRUCTIONS_ED_ALL_ED_FT
1) Please follow-up in emergency department between October 1 and October 4 for repeat hCG blood level.  2) If you have any worsening of symptoms, increased abdominal pain, vaginal bleeding, lightheadedness, palpitations or any other concerns please return to the ED immediately.  3) You may have been given a copy of your labs and/or imaging.  Please go over these with your primary care doctor.

## 2023-09-27 NOTE — CONSULT NOTE ADULT - SUBJECTIVE AND OBJECTIVE BOX
FRITZ AKTER  38y  Female [id number]    HPI: 39 yo  LMP  presenting for management of cornual ectopic pregnancy. She has received three doses of methotrexate and is no longer taking leucovorin rescue. Her trend is as follows  6248()->34682()->MTX ( in Dr. Medina's office)->30395()->08146()-> MTX( in [facility] ED) -> 93898 () -> MTX ( in [facility] ED) ->70884 () -> 4471 () -> 1129() -> 419.9() -> 132.2()  She has no complaints today, admits to intermittent vaginal spotting. She denies recent abdominal pain. She denies dizziness, lightheadedness, chest pain, SOB, palpitations, nausea, vomiting.      Name of GYN Physician: Dr Medina  OBHx: 2016 FT , SAB x1 6wk D&C requiring transfusion  GYNHx: denies fibroids, cysts, abnormal paps, STDs  PMH: denies  PSH: denies  Meds: Denies  All: NKDA    Vital Signs Last 24 Hrs  T(C): 36.8 (27 Sep 2023 12:19), Max: 36.8 (27 Sep 2023 12:19)  T(F): 98.3 (27 Sep 2023 12:19), Max: 98.3 (27 Sep 2023 12:19)  HR: 63 (27 Sep 2023 12:19) (63 - 72)  BP: 99/59 (27 Sep 2023 12:19) (99/59 - 123/68)  BP(mean): --  RR: 18 (27 Sep 2023 12:19) (18 - 18)  SpO2: 99% (27 Sep 2023 12:19) (99% - 100%)    Parameters below as of 27 Sep 2023 12:19  Patient On (Oxygen Delivery Method): room air        Physical Exam:   General: sitting comfortably in bed, NAD   HEENT: neck supple, full ROM  CV: RRR  Lungs: CTA b/l, good air flow b/l   Back: No CVA tenderness  Abd: Soft, non-tender, non-distended.    :  No bleeding on pad.

## 2023-09-27 NOTE — ED ADULT TRIAGE NOTE - GLASGOW COMA SCALE: BEST VERBAL RESPONSE, MLM
Problem: PHYSICAL THERAPY ADULT  Goal: Performs mobility at highest level of function for planned discharge setting  See evaluation for individualized goals  Treatment/Interventions: Functional transfer training, LE strengthening/ROM, Elevations, Therapeutic exercise, Endurance training, Patient/family training, Equipment eval/education, Bed mobility, Gait training, Compensatory technique education, Continued evaluation, Spoke to nursing  Equipment Recommended: Namrata Sebastian       See flowsheet documentation for full assessment, interventions and recommendations  Prognosis: Good  Problem List: Decreased strength, Decreased range of motion, Decreased endurance, Impaired balance, Decreased mobility, Decreased skin integrity, Orthopedic restrictions, Pain  Assessment: Tim Steven is a 77year old male who presents to [facility] to receive elective R JUDIE (anterior approach)  Current factors affecting complexity include cont pulse ox, NV checks, regression from baseline mobility, falls risk  Upon PT eval, pt impairments include decreased strength, decreased mobility, decreased balance, decreased endurance, increased pain, and orthopedic restrictions  Pt was min A when performing supine-sit and sit-stand transfers  Pt was able to amb 3 ft from bed-chair w/ RW and min A; WBAT RLE, short stride, improper weight shift, decreased R stance  Pt states that he was having slight lightheadedness once upright in chair and PT educated him that it is not uncommon after surgery  Pt prior level of func is independent w/ ADLS and func mobility  Pt lives w/ wife in 2 story home w/ first floor set up available and 1 MORENA  Skilled PT needed in order to increase pt strength, endurance, balance, mobility and maximize func independence   PT rec is home with family support and home PT upon DC from hospital    Barriers to Discharge: Inaccessible home environment     Recommendation: Home with family support, Home PT     PT - OK to Discharge: No    See flowsheet documentation for full assessment  (V5) oriented

## 2023-10-07 ENCOUNTER — EMERGENCY (EMERGENCY)
Facility: HOSPITAL | Age: 38
LOS: 1 days | Discharge: HOME | End: 2023-10-07
Admitting: STUDENT IN AN ORGANIZED HEALTH CARE EDUCATION/TRAINING PROGRAM
Payer: MEDICAID

## 2023-10-07 VITALS
DIASTOLIC BLOOD PRESSURE: 82 MMHG | TEMPERATURE: 98 F | RESPIRATION RATE: 16 BRPM | OXYGEN SATURATION: 100 % | HEART RATE: 74 BPM | SYSTOLIC BLOOD PRESSURE: 121 MMHG

## 2023-10-07 VITALS
TEMPERATURE: 98 F | DIASTOLIC BLOOD PRESSURE: 71 MMHG | SYSTOLIC BLOOD PRESSURE: 116 MMHG | OXYGEN SATURATION: 100 % | HEART RATE: 77 BPM | HEIGHT: 62 IN | RESPIRATION RATE: 16 BRPM

## 2023-10-07 LAB — HCG SERPL-ACNC: 11.1 MIU/ML — SIGNIFICANT CHANGE UP

## 2023-10-07 PROCEDURE — 99284 EMERGENCY DEPT VISIT MOD MDM: CPT

## 2023-10-07 NOTE — ED ADULT NURSE NOTE - OBJECTIVE STATEMENT
pt to wellness, a&ox4 and ambulatory, presents to for vaginal bleeding and repeat HCG. pt reports receiving 4 doses of methotrexate since august. pt denies any dizziness, cramping, abnormal vaginal discharge, fevers, chest pain, sob, n/v/d, urinary complaints. NAD noted at this time. lab work drawn and sent, pending results. this RN chaperoned NP Lady during pelvic exam, pt tolerated well. comfort and safety maintained.

## 2023-10-07 NOTE — CONSULT NOTE ADULT - SUBJECTIVE AND OBJECTIVE BOX
HPI: 37 yo  LMP  presenting for management of cornual ectopic pregnancy. She has received three doses of methotrexate. Her trend is as follows -   6248()->19626()->MTX ( in Dr. Medina's office)->39445()->77758()-> MTX( in [facility] ED) -> 49694 () -> MTX ( in [facility] ED) ->84726 () -> 4471 () -> 1129() -> 419.9() -> 132.2()->11.1(10/7)    She has no complaints today, admits to intermittent vaginal spotting. She denies recent abdominal pain. She denies dizziness, lightheadedness, chest pain, SOB, palpitations, nausea, vomiting.      Name of GYN Physician: Dr Medina  OBHx: 2016 FT , SAB x1 6wk D&C requiring transfusion  GYNHx: denies fibroids, cysts, abnormal paps, STDs  PMH: denies  PSH: denies  Meds: Denies  All: NKDA      REVIEW OF SYSTEMS  General: denies fevers, chills, tiredness  Skin/Breast: denies breast pain  Respiratory and Thorax: denies shortness of breath, denies cough  Cardiovascular: denies chest pain and denies palpitations  Gastrointestinal: denies abdominal pain, nausea/ vomiting	  Genitourinary: denies dysuria, increased urinary frequency, urgency	  Constitutional, Cardiovascular, Respiratory, Gastrointestinal, Genitourinary, Musculoskeletal and Integumentary review of systems are normal except as noted. 	      Vital Signs Last 24 Hrs  T(C): 36.9 (07 Oct 2023 12:08), Max: 36.9 (07 Oct 2023 12:08)  T(F): 98.5 (07 Oct 2023 12:08), Max: 98.5 (07 Oct 2023 12:08)  HR: 77 (07 Oct 2023 12:08) (77 - 77)  BP: 116/71 (07 Oct 2023 12:08) (116/71 - 116/71)  BP(mean): --  RR: 16 (07 Oct 2023 12:08) (16 - 16)  SpO2: 100% (07 Oct 2023 12:08) (100% - 100%)    Parameters below as of 07 Oct 2023 12:08  Patient On (Oxygen Delivery Method): room air        PHYSICAL EXAM:   Jt: Darlene PUGH  Gen: NAD   Cardiovascular: Clinically well perfused  Respiratory: Breathing comfortably on RA  Abd: Soft, non-tender, non-distended  Pelvic: <5 cc of blood in vaginal vault. no TTP on bimanual exam.  Neuro: AOx3

## 2023-10-07 NOTE — ED PROVIDER NOTE - CLINICAL SUMMARY MEDICAL DECISION MAKING FREE TEXT BOX
This is a 39 yo pmh migraine and ectopic pregnancy,  LMP  c/o vaginal bleeding. Reports he has received 4 doses of methotrexate  and today she is here for a follow up. Her OBGYN Dr Medina. Patient reports she is here today for repeat blood work as per her OBGYN consult told her to do so. She said could not come in 7 days because she was working. Reports had heavy bleeding 2 days ago,  today she only using a  liner. Denies fever, chills, shortness of breath, abdominal cramping, n, v, denies urinary symptoms, weakness.

## 2023-10-07 NOTE — ED PROVIDER NOTE - CPE EDP GASTRO NORM
(K07.213) Keratoconjunct sicca, not specified as Sjogren's, bilateral - Assesment : Examination revealed Dry Eye Syndrome - Plan : Monitor for changes. Advised patient to call our office with decreased vision or increased symptoms. Reviewed causes and treatment plan.   ATS OU TID/ PRN  1 YEAR EXAM normal...

## 2023-10-07 NOTE — ED PROVIDER NOTE - NSFOLLOWUPINSTRUCTIONS_ED_ALL_ED_FT
You were see in the ER for follow up on your bHCG, resulted 11.1.     Please follow up with your outpatient OBGYN within a week, for follow up blood work.     There are no signs of emergency conditions requiring admission to the hospital on today's workup.  Based on the evaluation, a presumptive diagnosis was made, however, further evaluation may be required by your primary care physician or a specialist for a more definitive diagnosis.  Therefore, please follow-up as directed or return to the Emergency Department if your symptoms change or worsen.    We recommend that you:  See your primary care physician within the next 72 hours for follow up.  Bring a copy of your discharge paperwork (including any test results) to your doctor.        *** Return immediately if you have worsening symptoms, chest pain, Shortness of Breath, abdominal pain, Nausea/Vomiting/Diarrhea, dizziness, weakness, confusion, severe headache, vision changes, urinary symptoms, syncope, falls, trauma, discharge, bleeding, fevers, or any other new/concerning symptoms. ***

## 2023-10-07 NOTE — ED PROVIDER NOTE - PATIENT PORTAL LINK FT
You can access the FollowMyHealth Patient Portal offered by [facility] by registering at the following website: [email or web address]. By joining [employer]’s FollowMyHealth portal, you will also be able to view your health information using other applications (apps) compatible with our system.

## 2023-10-07 NOTE — ED PROVIDER NOTE - PROGRESS NOTE DETAILS
[place] and see [place] [facility] [place] NP Jean- ob recommendation out patient follow up with pt's private obgyn doctor, verbalizes understanding instructions.

## 2023-10-07 NOTE — ED PROVIDER NOTE - OBJECTIVE STATEMENT
This is a 37 yo,    LMP  c/o vaginal bleeding. Reports he has received 4 doses of methotrexate  and today she is here for a follow up. Her OBGYN Dr Medina. Patient reports she is here today for repeat blood work as per her OBGYN consult told her to do so. She said could not come in 7 days because she was working. Reports had heavy bleeding 2 days ago,  today she only using a  liner. Denies fever, chills, shortness of breath, abdominal cramping, n, v, denies urinary symptoms, weakness.

## 2023-10-07 NOTE — ED ADULT TRIAGE NOTE - CHIEF COMPLAINT QUOTE
Pt was given Methotrexate for miscarriage August 2023. Pt was instructed to come to ED for follow up by her OBGYN. Pt unsure OBGYN MD's name. Pt has vaginal bleeding

## 2023-10-07 NOTE — CONSULT NOTE ADULT - ASSESSMENT
39yo  LMP  presenting for repeat bHCG s/p 3 doses of Methotrexate in setting of multidose MTX therapy for management of cornual ectopic pregnancy. Patient received first dose of MTX  outpt w/ Dr. Medina. Patients bHCG downtrending 24138()->44170()->29227()->04677()->01411()->4471 ()->1129()->419()->132()->11.1(10/7).    - Will trend beta HCG weekly until beta HCG decreases to <5, no need for repeat TVUS unless patient presents with sx of severe abdominal pain or associated sx  - Patient to follow up in approximately 1 week for repeat beta HCG. Patient strongly encouraged to reach out to outpatient office for lab script to have repeat bHCG outpatient.   - Ectopic precautions reviewed with patient, including severe abdominal pain not relieved with medications, nausea, vomiting, dizziness, lightheadedness, heavy vaginal bleeding. Discussed with patient importance of follow up for b-HCG. Patient expressed understanding.  All questions and concerns addressed to patient's apparent satisfaction.  - Patient stable for d/c home from GYN perspective.    - Remaining care per ED team.        TOMASZ Sun PGY2  d/w Dr. Alonso

## 2023-10-13 ENCOUNTER — EMERGENCY (EMERGENCY)
Facility: HOSPITAL | Age: 38
LOS: 1 days | Discharge: HOME | End: 2023-10-13
Attending: STUDENT IN AN ORGANIZED HEALTH CARE EDUCATION/TRAINING PROGRAM | Admitting: STUDENT IN AN ORGANIZED HEALTH CARE EDUCATION/TRAINING PROGRAM
Payer: MEDICAID

## 2023-10-13 VITALS
RESPIRATION RATE: 16 BRPM | OXYGEN SATURATION: 99 % | HEART RATE: 87 BPM | TEMPERATURE: 98 F | SYSTOLIC BLOOD PRESSURE: 130 MMHG | DIASTOLIC BLOOD PRESSURE: 97 MMHG

## 2023-10-13 VITALS
SYSTOLIC BLOOD PRESSURE: 116 MMHG | OXYGEN SATURATION: 100 % | TEMPERATURE: 99 F | RESPIRATION RATE: 16 BRPM | DIASTOLIC BLOOD PRESSURE: 76 MMHG | HEART RATE: 81 BPM | HEIGHT: 62 IN

## 2023-10-13 LAB — HCG SERPL-ACNC: 10.2 MIU/ML — SIGNIFICANT CHANGE UP

## 2023-10-13 PROCEDURE — 99283 EMERGENCY DEPT VISIT LOW MDM: CPT

## 2023-10-13 NOTE — CONSULT NOTE ADULT - SUBJECTIVE AND OBJECTIVE BOX
FRITZ AKTER  38y  Female [id number]    HPI: 37 yo  LMP  presenting for management of cornual ectopic pregnancy. She has received three doses of methotrexate. Her trend is as follows -   6248()->54419()->MTX ( in Dr. Medina's office)->24437()->75666()-> MTX( in [facility] ED) -> 78128 () -> MTX ( in [facility] ED) ->92347 () -> 4471 () -> 1129() -> 419.9() -> 132.2()->11.1(10/7)->10.2(10/13)    She has no complaints today, admits to intermittent vaginal spotting. She denies recent abdominal pain. She denies dizziness, lightheadedness, chest pain, SOB, palpitations, nausea, vomiting.      Name of GYN Physician: Dr Medina  OBHx: 2016 FT , SAB x1 6wk D&C requiring transfusion  GYNHx: denies fibroids, cysts, abnormal paps, STDs  PMH: denies  PSH: denies  Meds: Denies  All: NKDA      Vital Signs Last 24 Hrs  T(C): 37 (13 Oct 2023 15:30), Max: 37 (13 Oct 2023 15:30)  T(F): 98.6 (13 Oct 2023 15:30), Max: 98.6 (13 Oct 2023 15:30)  HR: 81 (13 Oct 2023 15:30) (81 - 81)  BP: 116/76 (13 Oct 2023 15:30) (116/76 - 116/76)  BP(mean): --  RR: 16 (13 Oct 2023 15:30) (16 - 16)  SpO2: 100% (13 Oct 2023 15:30) (100% - 100%)    Parameters below as of 13 Oct 2023 15:30  Patient On (Oxygen Delivery Method): room air        Physical Exam:   General: sitting comfortably in bed, NAD   HEENT: neck supple, full ROM  CV: RRR  Lungs: CTA b/l, good air flow b/l   Back: No CVA tenderness  Abd: Soft, non-tender, non-distended.  Bowel sounds present.    :  No bleeding on pad.    External labia wnl.  Bimanual exam with no cervical motion tenderness, uterus wnl, adnexa non palpable b/l.  Cervix closed vs. Cervix dilated  Speculum Exam: No active bleeding from os.  Physiologic discharge.    Ext: non-tender b/l, no edema     LABS:                I&O's Detail          RADIOLOGY & ADDITIONAL STUDIES:     FRITZ AKTER  38y  Female [id number]    HPI: 39 yo  LMP  presenting for management of cornual ectopic pregnancy. She has received three doses of methotrexate. Her trend is as follows -   6248()->13750()->MTX ( in Dr. Medina's office)->90906()->88333()-> MTX( in [facility] ED) -> 59511 () -> MTX ( in [facility] ED) ->66109 () -> 4471 () -> 1129() -> 419.9() -> 132.2()->11.1(10/7)->10.2(10/13)    She has no complaints today, admits to intermittent vaginal spotting. She denies recent abdominal pain. She denies dizziness, lightheadedness, chest pain, SOB, palpitations, nausea, vomiting.      Name of GYN Physician: Dr Medina  OBHx: 2016 FT , SAB x1 6wk D&C requiring transfusion  GYNHx: denies fibroids, cysts, abnormal paps, STDs  PMH: denies  PSH: denies  Meds: Denies  All: NKDA      Vital Signs Last 24 Hrs  T(C): 37 (13 Oct 2023 15:30), Max: 37 (13 Oct 2023 15:30)  T(F): 98.6 (13 Oct 2023 15:30), Max: 98.6 (13 Oct 2023 15:30)  HR: 81 (13 Oct 2023 15:30) (81 - 81)  BP: 116/76 (13 Oct 2023 15:30) (116/76 - 116/76)  BP(mean): --  RR: 16 (13 Oct 2023 15:30) (16 - 16)  SpO2: 100% (13 Oct 2023 15:30) (100% - 100%)    Parameters below as of 13 Oct 2023 15:30  Patient On (Oxygen Delivery Method): room air        Physical Exam:   General: sitting comfortably in bed, NAD   Lungs: breathing comfortably on RA  Abd: Soft, non-tender, non-distended.    :  No bleeding on pad.

## 2023-10-13 NOTE — ED ADULT NURSE NOTE - NSFALLUNIVINTERV_ED_ALL_ED
Bed/Stretcher in lowest position, wheels locked, appropriate side rails in place/Call bell, personal items and telephone in reach/Instruct patient to call for assistance before getting out of bed/chair/stretcher/Non-slip footwear applied when patient is off stretcher/[place] to call system/Physically safe environment - no spills, clutter or unnecessary equipment/Purposeful proactive rounding/Room/bathroom lighting operational, light cord in reach

## 2023-10-13 NOTE — ED PROVIDER NOTE - OBJECTIVE STATEMENT
37 yo  LMP  who presents to the ED for follow-up. Reports has received 4 doses of methotrexate for ectopic and today she is here for a follow up. She was last seen here on 10/7 when she had a beta HCG of 11.1. Was told to get a repeat beta HCG in one week and decided to come to the ED today in order to get it. She has no current symptoms or complaints. No abdominal pain, light-headedness, dizziness, dysuria, pelvic pain.

## 2023-10-13 NOTE — ED ADULT TRIAGE NOTE - CHIEF COMPLAINT QUOTE
pt received methotrexate approx. 1 month ago, was told to come to ER to follow up, offers no complaints

## 2023-10-13 NOTE — ED PROVIDER NOTE - NSPTACCESSSVCSAPPTDETAILS_ED_ALL_ED_FT
Pt needs to follow up with Dr. Medina's office on monday for repeat hcg levels. She has multiple doses of methotrexate after having an ectopic pregnancy with downtrending hcg but not < 5 as of yet. Pt states she get turned down by the  and will not allow them to speak to the doctor. At time of pt's discharge the office was closed and I could not help with appointment.

## 2023-10-13 NOTE — ED PROVIDER NOTE - NSFOLLOWUPINSTRUCTIONS_ED_ALL_ED_FT
Received in the emergency room for repeat blood work and GYN follow-up.  Your hCG level is still increased at 10.2 therefore OB/GYN is recommending you see that your private OB/GYN Dr. Medina on Monday 10/16/23, for repeat testing.  Please return to the emergency room if you are experiencing any new, worsening, or concerning symptoms which include but are not limited to severe abdominal pain not relieved with medications, nausea, vomiting, dizziness, lightheadedness, heavy vaginal bleeding. You were seen in the emergency room for repeat blood work and GYN follow-up.  Your hCG level is still increased at 10.2 therefore OB/GYN is recommending you see that your private OB/GYN Dr. Medina on Monday 10/16/23, for repeat testing.  Please return to the emergency room if you are experiencing any new, worsening, or concerning symptoms which include but are not limited to severe abdominal pain not relieved with medications, nausea, vomiting, dizziness, lightheadedness, heavy vaginal bleeding.

## 2023-10-13 NOTE — ED PROVIDER NOTE - PATIENT PORTAL LINK FT
You can access the FollowMyHealth Patient Portal offered by [facility] by registering at the following website: [email or web address]. By joining [employer]’s FollowMyHealth portal, you will also be able to view your health information using other applications (apps) compatible with our system.

## 2023-10-13 NOTE — ED PROVIDER NOTE - CLINICAL SUMMARY MEDICAL DECISION MAKING FREE TEXT BOX
37 yo F who presents to the ED today for a repeat beta-HCG. She is s/p multiple doses of methotrexate after having an ectopic. beta HCG has been down-trending appropriately, was last 11.1 on 10/7. Coming in today for a repeat HCG. No current symptoms/complaints. Will get repeat HCg

## 2023-10-13 NOTE — CONSULT NOTE ADULT - ASSESSMENT
39yo  LMP  presenting for repeat bHCG s/p 3 doses of Methotrexate in setting of multidose MTX therapy for management of cornual ectopic pregnancy. Patient received first dose of MTX  outpt w/ Dr. Medina. Patients bHCG downtrending 31682()->22452()->55535()->60046()->91912()->4471 ()->1129()->419()->132()->11.1(10/7)->10.2(10/13).    - Will trend beta HCG weekly until beta HCG decreases to <5, no need for repeat TVUS unless patient presents with sx of severe abdominal pain or associated sx  - Patient to follow up on Monday with private OBGYN  - Ectopic precautions reviewed with patient, including severe abdominal pain not relieved with medications, nausea, vomiting, dizziness, lightheadedness, heavy vaginal bleeding. Discussed with patient importance of follow up for b-HCG. Patient expressed understanding.  All questions and concerns addressed to patient's apparent satisfaction.  - Patient stable for d/c home from GYN perspective.    - Remaining care per ED team.        DTelsie PGY2   d/w Dr. Alonso

## 2023-10-13 NOTE — ED ADULT NURSE NOTE - OBJECTIVE STATEMENT
Patient received in wellness, exam room 3. Patient A&Ox3 and ambulatory at baseline. Patient presents to the ED s/p 1 month methotrexate for follow up. phx migraines, ectopic pregnancy. Patient states she is following s/p receiving methotrexate appoximately one month ago. Patient denies headache, dizziness, nausea/vomiting, fever/chills, and pain. Patient offers no complaints at this time. Respirations even and unlabored, no signs/symptoms of acute distress; patient denies dyspnea, shortness of breath, and chest pain. Labs butterflied and sent, patient is stable at this time. Steady gait observed.

## 2024-04-17 NOTE — ED ADULT TRIAGE NOTE - HEIGHT IN FEET
----- Message from Diamond Hodge MD sent at 4/17/2024 10:46 AM CDT -----  Please call Mr. Contreras and make him a follow up with me in 2 months. Thank you.   5

## 2024-05-10 ENCOUNTER — APPOINTMENT (OUTPATIENT)
Dept: OBGYN | Facility: CLINIC | Age: 39
End: 2024-05-10
Payer: MEDICAID

## 2024-05-10 VITALS
SYSTOLIC BLOOD PRESSURE: 125 MMHG | WEIGHT: 158 LBS | BODY MASS INDEX: 29.08 KG/M2 | HEIGHT: 62 IN | DIASTOLIC BLOOD PRESSURE: 78 MMHG

## 2024-05-10 DIAGNOSIS — N92.6 IRREGULAR MENSTRUATION, UNSPECIFIED: ICD-10-CM

## 2024-05-10 PROCEDURE — 99213 OFFICE O/P EST LOW 20 MIN: CPT | Mod: 25

## 2024-05-10 RX ORDER — PNV NO.95/FERROUS FUM/FOLIC AC 28MG-0.8MG
27-0.8-25 TABLET ORAL
Qty: 90 | Refills: 3 | Status: ACTIVE | COMMUNITY
Start: 2024-05-10 | End: 1900-01-01

## 2024-05-20 LAB
HCG SERPL-MCNC: 5429 MIU/ML
PROGEST SERPL-MCNC: 14 NG/ML

## 2024-05-20 NOTE — HISTORY OF PRESENT ILLNESS
[FreeTextEntry1] : Patient is a 38 year  old . LMP 24. She had a positive home pregnancy test. Patient concerned due to prior h/o ectopic pregnancy  OBHx:  c/b GDM 2023: MAB with DVC 2023: ectopic s/p MTX (managed by Dr. Medina)

## 2024-05-20 NOTE — DISCUSSION/SUMMARY
[FreeTextEntry1] : 38 year old P1 at 5w1d by LMP presenting with amenorrhea, ho ectopic pregnancy -f/u hcg and progesterone -f/u 2 weeks for confirmation of pregnancy dating sonogram

## 2024-05-24 ENCOUNTER — ASOB RESULT (OUTPATIENT)
Age: 39
End: 2024-05-24

## 2024-05-24 ENCOUNTER — APPOINTMENT (OUTPATIENT)
Dept: OBGYN | Facility: CLINIC | Age: 39
End: 2024-05-24
Payer: MEDICAID

## 2024-05-24 ENCOUNTER — APPOINTMENT (OUTPATIENT)
Dept: ANTEPARTUM | Facility: CLINIC | Age: 39
End: 2024-05-24

## 2024-05-24 VITALS
WEIGHT: 160 LBS | DIASTOLIC BLOOD PRESSURE: 79 MMHG | HEIGHT: 62 IN | SYSTOLIC BLOOD PRESSURE: 119 MMHG | BODY MASS INDEX: 29.44 KG/M2

## 2024-05-24 DIAGNOSIS — Z3A.01 LESS THAN 8 WEEKS GESTATION OF PREGNANCY: ICD-10-CM

## 2024-05-24 PROCEDURE — 76817 TRANSVAGINAL US OBSTETRIC: CPT | Mod: 59

## 2024-05-24 PROCEDURE — 99212 OFFICE O/P EST SF 10 MIN: CPT | Mod: TH,25

## 2024-05-24 PROCEDURE — 76815 OB US LIMITED FETUS(S): CPT

## 2024-05-27 LAB
ABO + RH PNL BLD: NORMAL
APPEARANCE: CLEAR
BACTERIA UR CULT: NORMAL
BACTERIA: NEGATIVE /HPF
BASOPHILS # BLD AUTO: 0.06 K/UL
BASOPHILS NFR BLD AUTO: 0.5 %
BILIRUBIN URINE: NEGATIVE
BLD GP AB SCN SERPL QL: NORMAL
BLOOD URINE: NEGATIVE
CAST: 0 /LPF
COLOR: YELLOW
EOSINOPHIL # BLD AUTO: 0.61 K/UL
EOSINOPHIL NFR BLD AUTO: 5.2 %
EPITHELIAL CELLS: 2 /HPF
ESTIMATED AVERAGE GLUCOSE: 111 MG/DL
GLUCOSE QUALITATIVE U: NEGATIVE MG/DL
GLUCOSE SERPL-MCNC: 93 MG/DL
HAV IGM SER QL: NONREACTIVE
HBA1C MFR BLD HPLC: 5.5 %
HBV CORE IGM SER QL: NONREACTIVE
HBV SURFACE AG SER QL: NONREACTIVE
HBV SURFACE AG SER QL: NONREACTIVE
HCG SERPL-MCNC: ABNORMAL MIU/ML
HCT VFR BLD CALC: 38.4 %
HCV AB SER QL: NONREACTIVE
HCV S/CO RATIO: 0.09 S/CO
HGB BLD-MCNC: 11.6 G/DL
HIV1+2 AB SPEC QL IA.RAPID: NONREACTIVE
IMM GRANULOCYTES NFR BLD AUTO: 0.6 %
KETONES URINE: NEGATIVE MG/DL
LEAD BLD-MCNC: 1.1 UG/DL
LEUKOCYTE ESTERASE URINE: NEGATIVE
LYMPHOCYTES # BLD AUTO: 3.07 K/UL
LYMPHOCYTES NFR BLD AUTO: 26.1 %
MAN DIFF?: NORMAL
MCHC RBC-ENTMCNC: 23.4 PG
MCHC RBC-ENTMCNC: 30.2 GM/DL
MCV RBC AUTO: 77.6 FL
MEV IGG FLD QL IA: 41.5 AU/ML
MEV IGG+IGM SER-IMP: POSITIVE
MICROSCOPIC-UA: NORMAL
MONOCYTES # BLD AUTO: 0.56 K/UL
MONOCYTES NFR BLD AUTO: 4.8 %
MUV AB SER-ACNC: POSITIVE
MUV IGG SER QL IA: 63.5 AU/ML
NEUTROPHILS # BLD AUTO: 7.39 K/UL
NEUTROPHILS NFR BLD AUTO: 62.8 %
NITRITE URINE: NEGATIVE
PH URINE: 7.5
PLATELET # BLD AUTO: 278 K/UL
PROTEIN URINE: NEGATIVE MG/DL
RBC # BLD: 4.95 M/UL
RBC # FLD: 16.8 %
RED BLOOD CELLS URINE: 0 /HPF
RUBV IGG FLD-ACNC: 3.2 INDEX
RUBV IGG SER-IMP: POSITIVE
SPECIFIC GRAVITY URINE: 1.01
T PALLIDUM AB SER QL IA: NEGATIVE
UROBILINOGEN URINE: 0.2 MG/DL
WBC # FLD AUTO: 11.76 K/UL
WHITE BLOOD CELLS URINE: 0 /HPF

## 2024-05-30 LAB
M TB IFN-G BLD-IMP: NEGATIVE
QUANTIFERON TB PLUS MITOGEN MINUS NIL: >10 IU/ML
QUANTIFERON TB PLUS NIL: 0.05 IU/ML
QUANTIFERON TB PLUS TB1 MINUS NIL: 0.04 IU/ML
QUANTIFERON TB PLUS TB2 MINUS NIL: 0 IU/ML

## 2024-06-13 ENCOUNTER — APPOINTMENT (OUTPATIENT)
Dept: OBGYN | Facility: CLINIC | Age: 39
End: 2024-06-13
Payer: MEDICAID

## 2024-06-13 VITALS
BODY MASS INDEX: 30 KG/M2 | WEIGHT: 163 LBS | DIASTOLIC BLOOD PRESSURE: 80 MMHG | HEIGHT: 62 IN | SYSTOLIC BLOOD PRESSURE: 127 MMHG

## 2024-06-13 DIAGNOSIS — O21.9 VOMITING OF PREGNANCY, UNSPECIFIED: ICD-10-CM

## 2024-06-13 DIAGNOSIS — Z3A.10 10 WEEKS GESTATION OF PREGNANCY: ICD-10-CM

## 2024-06-13 PROCEDURE — 99213 OFFICE O/P EST LOW 20 MIN: CPT | Mod: TH,25

## 2024-06-13 RX ORDER — DOXYLAMINE SUCCINATE AND PYRIDOXINE HYDROCHLORIDE 10; 10 MG/1; MG/1
10-10 TABLET, DELAYED RELEASE ORAL
Qty: 270 | Refills: 1 | Status: ACTIVE | COMMUNITY
Start: 2024-06-13 | End: 1900-01-01

## 2024-06-19 LAB
CHROMOSOME13 INTERPRETATION: NORMAL
CHROMOSOME13 TEST RESULT: NORMAL
CHROMOSOME18 INTERPRETATION: NORMAL
CHROMOSOME18 TEST RESULT: NORMAL
CHROMOSOME21 INTERPRETATION: NORMAL
CHROMOSOME21 TEST RESULT: NORMAL
FETAL FRACTION: NORMAL
PERFORMANCE AND LIMITATIONS: NORMAL
SEX CHROMOSOME INTERPRETATION: NORMAL
SEX CHROMOSOME TEST RESULT: NORMAL
VERIFI PRENATAL TEST: NOT DETECTED

## 2024-06-24 ENCOUNTER — APPOINTMENT (OUTPATIENT)
Dept: OBGYN | Facility: CLINIC | Age: 39
End: 2024-06-24

## 2024-06-28 ENCOUNTER — APPOINTMENT (OUTPATIENT)
Dept: OBGYN | Facility: CLINIC | Age: 39
End: 2024-06-28
Payer: MEDICAID

## 2024-06-28 ENCOUNTER — APPOINTMENT (OUTPATIENT)
Dept: ANTEPARTUM | Facility: CLINIC | Age: 39
End: 2024-06-28
Payer: MEDICAID

## 2024-06-28 ENCOUNTER — ASOB RESULT (OUTPATIENT)
Age: 39
End: 2024-06-28

## 2024-06-28 VITALS
DIASTOLIC BLOOD PRESSURE: 80 MMHG | WEIGHT: 162 LBS | BODY MASS INDEX: 29.81 KG/M2 | SYSTOLIC BLOOD PRESSURE: 122 MMHG | HEIGHT: 62 IN

## 2024-06-28 PROCEDURE — 76801 OB US < 14 WKS SINGLE FETUS: CPT | Mod: 59

## 2024-06-28 PROCEDURE — 76813 OB US NUCHAL MEAS 1 GEST: CPT

## 2024-06-28 PROCEDURE — 99213 OFFICE O/P EST LOW 20 MIN: CPT | Mod: TH

## 2024-07-23 ENCOUNTER — APPOINTMENT (OUTPATIENT)
Dept: OBGYN | Facility: CLINIC | Age: 39
End: 2024-07-23
Payer: MEDICAID

## 2024-07-23 VITALS
BODY MASS INDEX: 30.18 KG/M2 | DIASTOLIC BLOOD PRESSURE: 83 MMHG | WEIGHT: 164 LBS | SYSTOLIC BLOOD PRESSURE: 117 MMHG | HEIGHT: 62 IN

## 2024-07-23 DIAGNOSIS — O21.9 VOMITING OF PREGNANCY, UNSPECIFIED: ICD-10-CM

## 2024-07-23 PROCEDURE — 99213 OFFICE O/P EST LOW 20 MIN: CPT | Mod: TH,25

## 2024-07-30 LAB
AF-AFP DISCLAIMER: NORMAL
AFP  MOM: 1.06
AFP CONCENTRATION: 29.24 NG/ML
AFP INTERPRETATION: NORMAL
AFP MOM CUT-OFF: 2.5
AFP PERCENTILE: 57
AFP SCREENING RESULT: NORMAL
AFTER SCREENING RISK OPEN SPINA BIFIDA: NORMAL
BEFORE SCREENING RISK OPEN SPINA BIFIDA: NORMAL
EXTREME ANALYTE ALERT: NO
GESTATIONAL  AGE: NORMAL
MATERNAL WGT: 164
RACE/ETHNICITY: NORMAL

## 2024-08-12 ENCOUNTER — EMERGENCY (EMERGENCY)
Facility: HOSPITAL | Age: 39
LOS: 1 days | Discharge: HOME | End: 2024-08-12
Attending: STUDENT IN AN ORGANIZED HEALTH CARE EDUCATION/TRAINING PROGRAM | Admitting: STUDENT IN AN ORGANIZED HEALTH CARE EDUCATION/TRAINING PROGRAM
Payer: MEDICAID

## 2024-08-12 VITALS
DIASTOLIC BLOOD PRESSURE: 72 MMHG | HEIGHT: 62 IN | HEART RATE: 85 BPM | OXYGEN SATURATION: 98 % | RESPIRATION RATE: 17 BRPM | SYSTOLIC BLOOD PRESSURE: 107 MMHG | WEIGHT: 162.92 LBS | TEMPERATURE: 98 F

## 2024-08-12 LAB
FLUAV AG NPH QL: SIGNIFICANT CHANGE UP
FLUBV AG NPH QL: SIGNIFICANT CHANGE UP
RSV RNA NPH QL NAA+NON-PROBE: SIGNIFICANT CHANGE UP
SARS-COV-2 RNA SPEC QL NAA+PROBE: SIGNIFICANT CHANGE UP

## 2024-08-12 PROCEDURE — 71045 X-RAY EXAM CHEST 1 VIEW: CPT | Mod: 26

## 2024-08-12 PROCEDURE — 99284 EMERGENCY DEPT VISIT MOD MDM: CPT

## 2024-08-12 RX ORDER — CROMOLYN SODIUM 20 MG/2ML
2 SOLUTION INTRABRONCHIAL
Qty: 1 | Refills: 0
Start: 2024-08-12 | End: 2024-08-18

## 2024-08-12 NOTE — ED ADULT TRIAGE NOTE - CHIEF COMPLAINT QUOTE
patient  18 weeks pregnant, RICK , NORY Armstrong c/o cough and sneezing x3 weeks. patient states she has seen a doctor for this who told her she had allergies and gave her an allergy pill however it is not improving. past medical history of migraines. patient to stay in main ER per L&D triage provider Tenn[place]

## 2024-08-12 NOTE — ED PROVIDER NOTE - PATIENT PORTAL LINK FT
You can access the FollowMyHealth Patient Portal offered by [facility] by registering at the following website: [email or web address]. By joining [employer]’s FollowMyHealth portal, you will also be able to view your health information using other applications (apps) compatible with our system.

## 2024-08-12 NOTE — ED PROVIDER NOTE - PHYSICAL EXAMINATION
Physical Exam:  Gen: NAD, AOx4, non-toxic appearing, able to ambulate without assistance  Head: NCAT  HEENT: EOMI, PEERLA, normal conjunctiva, tongue midline, oral mucosa moist, tympanic membranes gray b/l  Lung: CTAB, no respiratory distress, no wheezes/rhonchi/rales B/L, speaking in full sentences  CV: RRR, no murmurs, rubs or gallops  Abd: gravid for 4 months, soft, NT, ND, no guarding, no rigidity, no rebound tenderness, no CVA tenderness   MSK: no visible deformities, ROM normal in UE/LE, no back pain  Neuro: No focal sensory or motor deficits  Skin: Warm, well perfused, no rash, no leg swelling

## 2024-08-12 NOTE — ED PROVIDER NOTE - CLINICAL SUMMARY MEDICAL DECISION MAKING FREE TEXT BOX
38-year-old woman G2, P4 16 weeks pregnant with no significant past medical history complaining of runny nose ongoing for the past 3 weeks worst in the morning or at night, denies shortness of breath, wheezing, nausea, abdominal pain, urinary symptoms, lightheadedness, recent travel, sick contacts.  Her OB prescribed her Zyrtec, which she admits helps after a couple hours. Admits she was prescribed antibiotics couple weeks ago for a right sided otitis media, has finished her course and no longer has ear pain. she does admit to some muffled hearing in that ear.  Low suspicion for infection, 38-year-old woman G2, P4 16 weeks pregnant with no significant past medical history complaining of runny nose ongoing for the past 3 weeks worst in the morning or at night, denies shortness of breath, wheezing, nausea, abdominal pain, urinary symptoms, lightheadedness, recent travel, sick contacts.  Her OB prescribed her Zyrtec, which she admits helps after a couple hours. Admits she was prescribed antibiotics couple weeks ago for a right sided otitis media, has finished her course and no longer has ear pain. she does admit to some muffled hearing in that ear.  In setting of no fever, SOB, CP or systemic symptoms and long duration, low suspicion for infection. Pna, Covid, flu r/o, with low suspicion for cardiac or lower respiratory causes.

## 2024-08-12 NOTE — ED PROVIDER NOTE - NS ED ROS FT
CONSTITUTIONAL: No fevers, no chills, no lightheadedness, no dizziness  EYES: no visual changes, no eye pain  EARS: no ear drainage, no ear pain, no change in hearing  NOSE: no nasal congestion  MOUTH/THROAT: no sore throat  CV: No chest pain, no palpitations  RESP: No SOB, no cough, +stuffy nose, some muffled hearing in the R  GI: No n/v/d, no abd pain  : no dysuria, no hematuria, no flank pain  MSK: no back pain, no extremity pain  SKIN: no rashes  NEURO: no headache, no focal weakness, no decreased sensation/parasthesias

## 2024-08-12 NOTE — ED PROVIDER NOTE - ATTENDING CONTRIBUTION TO CARE
39yo F At about 18 weeks gestation pw 3 + weeks of runny nose and cough. was seen by her dr and given cetirizine which helps for a time, the cough is worse at night and lasts a lot of the night no chest pain or sob  no fevers or chills, cough is nonproductive   pt well apeparing, lungs clear  likely allergic rhinitis vs pergnancy rhitnits  will check xr ro pna  start nasal steroid, allergy fu

## 2024-08-12 NOTE — ED PROVIDER NOTE - NSFOLLOWUPINSTRUCTIONS_ED_ALL_ED_FT
You were seen in the ED for allergic rhinitis which is a stuffy nose due to allergies. You have a prescription sent to your pharmacy which is a nasal spray that you can spray once in each nostril up to 6 times a day, as needed. Please do not use it more than as prescribed.     Return to the ED if you have any fever, chills, nausea, vomiting, chest pain, palpitations, cough, shortness of breath, trouble breathing, diarrhea.     Please follow up with your PCP for a more thorough evaluation of your health.

## 2024-08-30 ENCOUNTER — APPOINTMENT (OUTPATIENT)
Dept: OBGYN | Facility: CLINIC | Age: 39
End: 2024-08-30
Payer: MEDICAID

## 2024-08-30 ENCOUNTER — APPOINTMENT (OUTPATIENT)
Dept: ANTEPARTUM | Facility: CLINIC | Age: 39
End: 2024-08-30

## 2024-08-30 VITALS
DIASTOLIC BLOOD PRESSURE: 77 MMHG | SYSTOLIC BLOOD PRESSURE: 114 MMHG | WEIGHT: 168 LBS | HEIGHT: 62 IN | BODY MASS INDEX: 30.91 KG/M2

## 2024-08-30 PROCEDURE — 76811 OB US DETAILED SNGL FETUS: CPT

## 2024-08-30 PROCEDURE — 76817 TRANSVAGINAL US OBSTETRIC: CPT | Mod: 59

## 2024-08-30 PROCEDURE — 99213 OFFICE O/P EST LOW 20 MIN: CPT | Mod: TH

## 2024-09-04 ENCOUNTER — APPOINTMENT (OUTPATIENT)
Dept: ANTEPARTUM | Facility: CLINIC | Age: 39
End: 2024-09-04
Payer: MEDICAID

## 2024-09-04 ENCOUNTER — ASOB RESULT (OUTPATIENT)
Age: 39
End: 2024-09-04

## 2024-09-04 PROCEDURE — 76817 TRANSVAGINAL US OBSTETRIC: CPT

## 2024-09-04 PROCEDURE — 99212 OFFICE O/P EST SF 10 MIN: CPT | Mod: TH,25

## 2024-09-04 PROCEDURE — 76815 OB US LIMITED FETUS(S): CPT

## 2024-09-04 RX ORDER — PROGESTERONE 200 MG/1
200 CAPSULE ORAL
Qty: 30 | Refills: 2 | Status: ACTIVE | COMMUNITY
Start: 2024-09-04 | End: 1900-01-01

## 2024-09-11 ENCOUNTER — APPOINTMENT (OUTPATIENT)
Dept: ANTEPARTUM | Facility: CLINIC | Age: 39
End: 2024-09-11
Payer: MEDICAID

## 2024-09-11 ENCOUNTER — ASOB RESULT (OUTPATIENT)
Age: 39
End: 2024-09-11

## 2024-09-11 PROCEDURE — 76815 OB US LIMITED FETUS(S): CPT

## 2024-09-11 PROCEDURE — 76817 TRANSVAGINAL US OBSTETRIC: CPT

## 2024-09-17 ENCOUNTER — NON-APPOINTMENT (OUTPATIENT)
Age: 39
End: 2024-09-17

## 2024-09-17 ENCOUNTER — APPOINTMENT (OUTPATIENT)
Dept: OBGYN | Facility: CLINIC | Age: 39
End: 2024-09-17
Payer: MEDICAID

## 2024-09-17 VITALS
WEIGHT: 170 LBS | HEIGHT: 62 IN | DIASTOLIC BLOOD PRESSURE: 79 MMHG | SYSTOLIC BLOOD PRESSURE: 119 MMHG | BODY MASS INDEX: 31.28 KG/M2

## 2024-09-17 DIAGNOSIS — Z3A.23 23 WEEKS GESTATION OF PREGNANCY: ICD-10-CM

## 2024-09-17 DIAGNOSIS — J31.0 CHRONIC RHINITIS: ICD-10-CM

## 2024-09-17 PROCEDURE — 99213 OFFICE O/P EST LOW 20 MIN: CPT | Mod: TH

## 2024-09-17 RX ORDER — IPRATROPIUM BROMIDE 21 UG/1
0.03 SPRAY NASAL 3 TIMES DAILY
Qty: 1 | Refills: 3 | Status: ACTIVE | COMMUNITY
Start: 2024-09-17 | End: 1900-01-01

## 2024-09-18 DIAGNOSIS — O99.810 ABNORMAL GLUCOSE COMPLICATING PREGNANCY: ICD-10-CM

## 2024-09-18 DIAGNOSIS — O99.019 ANEMIA COMPLICATING PREGNANCY, UNSPECIFIED TRIMESTER: ICD-10-CM

## 2024-09-18 LAB
GLUCOSE 1H P 50 G GLC PO SERPL-MCNC: 183 MG/DL
HCT VFR BLD CALC: 34.3 %
HCV AB SER QL: NONREACTIVE
HCV S/CO RATIO: 0.07 S/CO
HGB BLD-MCNC: 10.4 G/DL
MCHC RBC-ENTMCNC: 24 PG
MCHC RBC-ENTMCNC: 30.3 GM/DL
MCV RBC AUTO: 79.2 FL
PLATELET # BLD AUTO: 248 K/UL
RBC # BLD: 4.33 M/UL
RBC # FLD: 15.9 %
WBC # FLD AUTO: 13.42 K/UL

## 2024-09-18 RX ORDER — DOCUSATE SODIUM 100 MG/1
100 CAPSULE ORAL
Qty: 180 | Refills: 3 | Status: ACTIVE | COMMUNITY
Start: 2024-09-18 | End: 1900-01-01

## 2024-09-19 RX ORDER — PRENATAL SUPPLEMENT WITH DHA 1100; 30; 1.6; 1.8; 15; 2.5; .012; 1; .15; 20; 25; 2; 1000; 200; 20; 29 [IU]/1; MG/1; MG/1; MG/1; MG/1; MG/1; MG/1; MG/1; MG/1; MG/1; MG/1; MG/1; [IU]/1; MG/1; [IU]/1; MG/1
29-1-200 CAPSULE, GELATIN COATED ORAL
Qty: 30 | Refills: 6 | Status: ACTIVE | COMMUNITY
Start: 2024-09-19 | End: 1900-01-01

## 2024-09-28 LAB
ESTIMATED AVERAGE GLUCOSE: 120 MG/DL
HBA1C MFR BLD HPLC: 5.8 %

## 2024-09-30 RX ORDER — BLOOD SUGAR DIAGNOSTIC
STRIP MISCELLANEOUS
Qty: 2 | Refills: 4 | Status: ACTIVE | COMMUNITY
Start: 2024-09-30 | End: 1900-01-01

## 2024-09-30 RX ORDER — LANCETS
EACH MISCELLANEOUS
Qty: 2 | Refills: 4 | Status: ACTIVE | COMMUNITY
Start: 2024-09-30 | End: 1900-01-01

## 2024-09-30 RX ORDER — ISOPROPYL ALCOHOL 0.7 ML/ML
SWAB TOPICAL
Qty: 2 | Refills: 4 | Status: ACTIVE | COMMUNITY
Start: 2024-09-30 | End: 1900-01-01

## 2024-09-30 RX ORDER — BLOOD-GLUCOSE METER
W/DEVICE KIT MISCELLANEOUS
Qty: 1 | Refills: 0 | Status: ACTIVE | COMMUNITY
Start: 2024-09-30 | End: 1900-01-01

## 2024-10-04 ENCOUNTER — ASOB RESULT (OUTPATIENT)
Age: 39
End: 2024-10-04

## 2024-10-04 ENCOUNTER — APPOINTMENT (OUTPATIENT)
Dept: MATERNAL FETAL MEDICINE | Facility: CLINIC | Age: 39
End: 2024-10-04

## 2024-10-04 PROCEDURE — G0109 DIAB MANAGE TRN IND/GROUP: CPT | Mod: 95

## 2024-10-14 ENCOUNTER — NON-APPOINTMENT (OUTPATIENT)
Age: 39
End: 2024-10-14

## 2024-10-15 ENCOUNTER — LABORATORY RESULT (OUTPATIENT)
Age: 39
End: 2024-10-15

## 2024-10-15 ENCOUNTER — MED ADMIN CHARGE (OUTPATIENT)
Age: 39
End: 2024-10-15

## 2024-10-15 ENCOUNTER — APPOINTMENT (OUTPATIENT)
Dept: OBGYN | Facility: CLINIC | Age: 39
End: 2024-10-15
Payer: MEDICAID

## 2024-10-15 VITALS — WEIGHT: 172 LBS | SYSTOLIC BLOOD PRESSURE: 119 MMHG | DIASTOLIC BLOOD PRESSURE: 78 MMHG | BODY MASS INDEX: 31.46 KG/M2

## 2024-10-15 PROCEDURE — 99213 OFFICE O/P EST LOW 20 MIN: CPT | Mod: TH

## 2024-10-16 ENCOUNTER — APPOINTMENT (OUTPATIENT)
Dept: MATERNAL FETAL MEDICINE | Facility: CLINIC | Age: 39
End: 2024-10-16
Payer: MEDICAID

## 2024-10-16 ENCOUNTER — ASOB RESULT (OUTPATIENT)
Age: 39
End: 2024-10-16

## 2024-10-16 PROCEDURE — G0108 DIAB MANAGE TRN  PER INDIV: CPT | Mod: 95

## 2024-10-17 RX ORDER — METFORMIN ER 500 MG 500 MG/1
500 TABLET ORAL
Qty: 1 | Refills: 1 | Status: ACTIVE | COMMUNITY
Start: 2024-10-16 | End: 1900-01-01

## 2024-10-21 DIAGNOSIS — O26.649 INTRAHEPATIC CHOLESTASIS OF PREGNANCY, UNSPECIFIED TRIMESTER: ICD-10-CM

## 2024-10-21 LAB
ALBUMIN SERPL ELPH-MCNC: 3.8 G/DL
ALP BLD-CCNC: 252 U/L
ALT SERPL-CCNC: 185 U/L
ANION GAP SERPL CALC-SCNC: 14 MMOL/L
AST SERPL-CCNC: 78 U/L
BILE AC SER-MCNC: 40.5 UMOL/L
BILIRUB SERPL-MCNC: 0.4 MG/DL
BUN SERPL-MCNC: 7 MG/DL
CALCIUM SERPL-MCNC: 9.6 MG/DL
CHLORIDE SERPL-SCNC: 100 MMOL/L
CO2 SERPL-SCNC: 21 MMOL/L
CREAT SERPL-MCNC: 0.38 MG/DL
EGFR: 131 ML/MIN/1.73M2
GLUCOSE SERPL-MCNC: 110 MG/DL
POTASSIUM SERPL-SCNC: 4.2 MMOL/L
PROT SERPL-MCNC: 7 G/DL
SODIUM SERPL-SCNC: 136 MMOL/L

## 2024-10-21 RX ORDER — URSODIOL 300 MG/1
300 CAPSULE ORAL
Qty: 60 | Refills: 6 | Status: ACTIVE | COMMUNITY
Start: 2024-10-21 | End: 1900-01-01

## 2024-10-23 ENCOUNTER — APPOINTMENT (OUTPATIENT)
Dept: MATERNAL FETAL MEDICINE | Facility: CLINIC | Age: 39
End: 2024-10-23

## 2024-10-28 ENCOUNTER — APPOINTMENT (OUTPATIENT)
Dept: ANTEPARTUM | Facility: CLINIC | Age: 39
End: 2024-10-28
Payer: MEDICAID

## 2024-10-28 ENCOUNTER — ASOB RESULT (OUTPATIENT)
Age: 39
End: 2024-10-28

## 2024-10-28 PROCEDURE — 76816 OB US FOLLOW-UP PER FETUS: CPT

## 2024-10-28 PROCEDURE — 76819 FETAL BIOPHYS PROFIL W/O NST: CPT

## 2024-10-29 ENCOUNTER — ASOB RESULT (OUTPATIENT)
Age: 39
End: 2024-10-29

## 2024-10-29 ENCOUNTER — APPOINTMENT (OUTPATIENT)
Dept: MATERNAL FETAL MEDICINE | Facility: CLINIC | Age: 39
End: 2024-10-29
Payer: MEDICAID

## 2024-10-29 DIAGNOSIS — O24.419 GESTATIONAL DIABETES MELLITUS IN PREGNANCY, UNSPECIFIED CONTROL: ICD-10-CM

## 2024-10-29 PROCEDURE — G0108 DIAB MANAGE TRN  PER INDIV: CPT | Mod: 95

## 2024-10-30 ENCOUNTER — APPOINTMENT (OUTPATIENT)
Dept: OBGYN | Facility: CLINIC | Age: 39
End: 2024-10-30
Payer: MEDICAID

## 2024-10-30 ENCOUNTER — APPOINTMENT (OUTPATIENT)
Dept: ANTEPARTUM | Facility: CLINIC | Age: 39
End: 2024-10-30
Payer: MEDICAID

## 2024-10-30 ENCOUNTER — ASOB RESULT (OUTPATIENT)
Age: 39
End: 2024-10-30

## 2024-10-30 ENCOUNTER — APPOINTMENT (OUTPATIENT)
Dept: MATERNAL FETAL MEDICINE | Facility: CLINIC | Age: 39
End: 2024-10-30

## 2024-10-30 VITALS — WEIGHT: 172 LBS | SYSTOLIC BLOOD PRESSURE: 116 MMHG | BODY MASS INDEX: 31.46 KG/M2 | DIASTOLIC BLOOD PRESSURE: 74 MMHG

## 2024-10-30 PROCEDURE — 99213 OFFICE O/P EST LOW 20 MIN: CPT | Mod: TH

## 2024-10-30 PROCEDURE — 76819 FETAL BIOPHYS PROFIL W/O NST: CPT

## 2024-10-30 RX ORDER — ISOPROPYL ALCOHOL 0.7 ML/ML
SWAB TOPICAL
Qty: 1 | Refills: 2 | Status: ACTIVE | COMMUNITY
Start: 2024-10-29 | End: 1900-01-01

## 2024-10-30 RX ORDER — ELECTROLYTES/DEXTROSE
32G X 4 MM SOLUTION, ORAL ORAL
Qty: 2 | Refills: 1 | Status: ACTIVE | COMMUNITY
Start: 2024-10-29 | End: 1900-01-01

## 2024-10-30 RX ORDER — INSULIN HUMAN 100 [IU]/ML
100 INJECTION, SUSPENSION SUBCUTANEOUS
Qty: 1 | Refills: 1 | Status: ACTIVE | COMMUNITY
Start: 2024-10-29 | End: 1900-01-01

## 2024-10-31 ENCOUNTER — NON-APPOINTMENT (OUTPATIENT)
Age: 39
End: 2024-10-31

## 2024-11-07 ENCOUNTER — NON-APPOINTMENT (OUTPATIENT)
Age: 39
End: 2024-11-07

## 2024-11-07 ENCOUNTER — ASOB RESULT (OUTPATIENT)
Age: 39
End: 2024-11-07

## 2024-11-07 ENCOUNTER — APPOINTMENT (OUTPATIENT)
Dept: ANTEPARTUM | Facility: CLINIC | Age: 39
End: 2024-11-07
Payer: MEDICAID

## 2024-11-07 PROCEDURE — 76818 FETAL BIOPHYS PROFILE W/NST: CPT

## 2024-11-07 PROCEDURE — 99213 OFFICE O/P EST LOW 20 MIN: CPT | Mod: TH,25

## 2024-11-11 ENCOUNTER — APPOINTMENT (OUTPATIENT)
Dept: ANTEPARTUM | Facility: CLINIC | Age: 39
End: 2024-11-11
Payer: MEDICAID

## 2024-11-11 ENCOUNTER — ASOB RESULT (OUTPATIENT)
Age: 39
End: 2024-11-11

## 2024-11-11 PROCEDURE — 76818 FETAL BIOPHYS PROFILE W/NST: CPT

## 2024-11-12 ENCOUNTER — ASOB RESULT (OUTPATIENT)
Age: 39
End: 2024-11-12

## 2024-11-12 ENCOUNTER — APPOINTMENT (OUTPATIENT)
Dept: MATERNAL FETAL MEDICINE | Facility: CLINIC | Age: 39
End: 2024-11-12
Payer: MEDICAID

## 2024-11-12 PROCEDURE — G0108 DIAB MANAGE TRN  PER INDIV: CPT | Mod: 95

## 2024-11-14 ENCOUNTER — APPOINTMENT (OUTPATIENT)
Dept: ANTEPARTUM | Facility: CLINIC | Age: 39
End: 2024-11-14

## 2024-11-14 ENCOUNTER — APPOINTMENT (OUTPATIENT)
Dept: OBGYN | Facility: CLINIC | Age: 39
End: 2024-11-14

## 2024-11-14 ENCOUNTER — ASOB RESULT (OUTPATIENT)
Age: 39
End: 2024-11-14

## 2024-11-14 VITALS — SYSTOLIC BLOOD PRESSURE: 131 MMHG | BODY MASS INDEX: 32.19 KG/M2 | DIASTOLIC BLOOD PRESSURE: 80 MMHG | WEIGHT: 176 LBS

## 2024-11-14 PROCEDURE — 99213 OFFICE O/P EST LOW 20 MIN: CPT | Mod: TH

## 2024-11-14 PROCEDURE — 76818 FETAL BIOPHYS PROFILE W/NST: CPT

## 2024-11-18 ENCOUNTER — APPOINTMENT (OUTPATIENT)
Dept: ANTEPARTUM | Facility: CLINIC | Age: 39
End: 2024-11-18
Payer: MEDICAID

## 2024-11-18 ENCOUNTER — ASOB RESULT (OUTPATIENT)
Age: 39
End: 2024-11-18

## 2024-11-18 PROCEDURE — 76816 OB US FOLLOW-UP PER FETUS: CPT

## 2024-11-18 PROCEDURE — 76818 FETAL BIOPHYS PROFILE W/NST: CPT | Mod: 59

## 2024-11-18 PROCEDURE — 99212 OFFICE O/P EST SF 10 MIN: CPT | Mod: TH,25

## 2024-11-20 ENCOUNTER — ASOB RESULT (OUTPATIENT)
Age: 39
End: 2024-11-20

## 2024-11-20 ENCOUNTER — APPOINTMENT (OUTPATIENT)
Dept: MATERNAL FETAL MEDICINE | Facility: CLINIC | Age: 39
End: 2024-11-20
Payer: MEDICAID

## 2024-11-20 PROCEDURE — G0108 DIAB MANAGE TRN  PER INDIV: CPT | Mod: 95

## 2024-11-21 ENCOUNTER — APPOINTMENT (OUTPATIENT)
Dept: ANTEPARTUM | Facility: CLINIC | Age: 39
End: 2024-11-21

## 2024-11-21 ENCOUNTER — APPOINTMENT (OUTPATIENT)
Dept: OBGYN | Facility: CLINIC | Age: 39
End: 2024-11-21

## 2024-11-21 VITALS
DIASTOLIC BLOOD PRESSURE: 83 MMHG | SYSTOLIC BLOOD PRESSURE: 131 MMHG | WEIGHT: 177 LBS | BODY MASS INDEX: 32.57 KG/M2 | HEIGHT: 62 IN

## 2024-11-21 PROCEDURE — 99213 OFFICE O/P EST LOW 20 MIN: CPT | Mod: TH,25

## 2024-11-21 PROCEDURE — 76818 FETAL BIOPHYS PROFILE W/NST: CPT

## 2024-11-22 ENCOUNTER — APPOINTMENT (OUTPATIENT)
Dept: ANTEPARTUM | Facility: CLINIC | Age: 39
End: 2024-11-22

## 2024-11-25 ENCOUNTER — APPOINTMENT (OUTPATIENT)
Dept: ANTEPARTUM | Facility: CLINIC | Age: 39
End: 2024-11-25
Payer: MEDICAID

## 2024-11-25 ENCOUNTER — ASOB RESULT (OUTPATIENT)
Age: 39
End: 2024-11-25

## 2024-11-25 LAB
ALBUMIN SERPL ELPH-MCNC: 3.5 G/DL
ALP BLD-CCNC: 191 U/L
ALT SERPL-CCNC: 44 U/L
ANION GAP SERPL CALC-SCNC: 13 MMOL/L
AST SERPL-CCNC: 27 U/L
BILE AC SER-MCNC: 14.9 UMOL/L
BILIRUB SERPL-MCNC: <0.2 MG/DL
BUN SERPL-MCNC: 10 MG/DL
CALCIUM SERPL-MCNC: 9.7 MG/DL
CHLORIDE SERPL-SCNC: 104 MMOL/L
CO2 SERPL-SCNC: 19 MMOL/L
CREAT SERPL-MCNC: 0.61 MG/DL
EGFR: 117 ML/MIN/1.73M2
GLUCOSE SERPL-MCNC: 106 MG/DL
POTASSIUM SERPL-SCNC: 4.4 MMOL/L
PROT SERPL-MCNC: 6.4 G/DL
SODIUM SERPL-SCNC: 136 MMOL/L

## 2024-11-25 PROCEDURE — 76818 FETAL BIOPHYS PROFILE W/NST: CPT

## 2024-11-27 ENCOUNTER — ASOB RESULT (OUTPATIENT)
Age: 39
End: 2024-11-27

## 2024-11-27 ENCOUNTER — APPOINTMENT (OUTPATIENT)
Dept: ANTEPARTUM | Facility: CLINIC | Age: 39
End: 2024-11-27
Payer: MEDICAID

## 2024-11-27 ENCOUNTER — APPOINTMENT (OUTPATIENT)
Dept: OBGYN | Facility: CLINIC | Age: 39
End: 2024-11-27
Payer: MEDICAID

## 2024-11-27 VITALS
HEIGHT: 62 IN | DIASTOLIC BLOOD PRESSURE: 81 MMHG | BODY MASS INDEX: 32.2 KG/M2 | WEIGHT: 175 LBS | SYSTOLIC BLOOD PRESSURE: 125 MMHG

## 2024-11-27 PROCEDURE — 99212 OFFICE O/P EST SF 10 MIN: CPT | Mod: TH

## 2024-11-27 PROCEDURE — 76818 FETAL BIOPHYS PROFILE W/NST: CPT

## 2024-12-02 ENCOUNTER — ASOB RESULT (OUTPATIENT)
Age: 39
End: 2024-12-02

## 2024-12-02 ENCOUNTER — APPOINTMENT (OUTPATIENT)
Dept: ANTEPARTUM | Facility: CLINIC | Age: 39
End: 2024-12-02
Payer: MEDICAID

## 2024-12-02 ENCOUNTER — APPOINTMENT (OUTPATIENT)
Dept: MATERNAL FETAL MEDICINE | Facility: CLINIC | Age: 39
End: 2024-12-02
Payer: MEDICAID

## 2024-12-02 PROBLEM — Q62.0 CONGENITAL HYDRONEPHROSIS: Status: ACTIVE | Noted: 2024-12-02

## 2024-12-02 PROCEDURE — 76818 FETAL BIOPHYS PROFILE W/NST: CPT

## 2024-12-02 PROCEDURE — G0108 DIAB MANAGE TRN  PER INDIV: CPT | Mod: 95

## 2024-12-03 ENCOUNTER — APPOINTMENT (OUTPATIENT)
Dept: PEDIATRIC UROLOGY | Facility: CLINIC | Age: 39
End: 2024-12-03
Payer: MEDICAID

## 2024-12-03 DIAGNOSIS — Q62.0 CONGENITAL HYDRONEPHROSIS: ICD-10-CM

## 2024-12-04 ENCOUNTER — APPOINTMENT (OUTPATIENT)
Dept: PEDIATRIC UROLOGY | Facility: CLINIC | Age: 39
End: 2024-12-04

## 2024-12-04 PROCEDURE — 99204 OFFICE O/P NEW MOD 45 MIN: CPT

## 2024-12-05 ENCOUNTER — APPOINTMENT (OUTPATIENT)
Dept: ANTEPARTUM | Facility: CLINIC | Age: 39
End: 2024-12-05
Payer: MEDICAID

## 2024-12-05 ENCOUNTER — ASOB RESULT (OUTPATIENT)
Age: 39
End: 2024-12-05

## 2024-12-05 ENCOUNTER — APPOINTMENT (OUTPATIENT)
Dept: OBGYN | Facility: CLINIC | Age: 39
End: 2024-12-05
Payer: MEDICAID

## 2024-12-05 ENCOUNTER — NON-APPOINTMENT (OUTPATIENT)
Age: 39
End: 2024-12-05

## 2024-12-05 VITALS — DIASTOLIC BLOOD PRESSURE: 89 MMHG | SYSTOLIC BLOOD PRESSURE: 138 MMHG | WEIGHT: 181 LBS | BODY MASS INDEX: 33.11 KG/M2

## 2024-12-05 DIAGNOSIS — R14.1 GAS PAIN: ICD-10-CM

## 2024-12-05 PROCEDURE — 76818 FETAL BIOPHYS PROFILE W/NST: CPT

## 2024-12-05 PROCEDURE — 99213 OFFICE O/P EST LOW 20 MIN: CPT | Mod: TH,25

## 2024-12-05 RX ORDER — SIMETHICONE 80 MG/1
80 TABLET, CHEWABLE ORAL
Qty: 30 | Refills: 0 | Status: ACTIVE | COMMUNITY
Start: 2024-12-05 | End: 1900-01-01

## 2024-12-09 ENCOUNTER — ASOB RESULT (OUTPATIENT)
Age: 39
End: 2024-12-09

## 2024-12-09 ENCOUNTER — APPOINTMENT (OUTPATIENT)
Dept: ANTEPARTUM | Facility: CLINIC | Age: 39
End: 2024-12-09
Payer: MEDICAID

## 2024-12-09 PROCEDURE — 76818 FETAL BIOPHYS PROFILE W/NST: CPT

## 2024-12-12 ENCOUNTER — ASOB RESULT (OUTPATIENT)
Age: 39
End: 2024-12-12

## 2024-12-12 ENCOUNTER — APPOINTMENT (OUTPATIENT)
Dept: OBGYN | Facility: CLINIC | Age: 39
End: 2024-12-12
Payer: MEDICAID

## 2024-12-12 ENCOUNTER — APPOINTMENT (OUTPATIENT)
Dept: ANTEPARTUM | Facility: CLINIC | Age: 39
End: 2024-12-12
Payer: MEDICAID

## 2024-12-12 VITALS — DIASTOLIC BLOOD PRESSURE: 85 MMHG | WEIGHT: 176 LBS | BODY MASS INDEX: 32.19 KG/M2 | SYSTOLIC BLOOD PRESSURE: 134 MMHG

## 2024-12-12 DIAGNOSIS — K64.9 UNSPECIFIED HEMORRHOIDS: ICD-10-CM

## 2024-12-12 DIAGNOSIS — Z3A.36 36 WEEKS GESTATION OF PREGNANCY: ICD-10-CM

## 2024-12-12 PROCEDURE — 76819 FETAL BIOPHYS PROFIL W/O NST: CPT | Mod: 59

## 2024-12-12 PROCEDURE — 99213 OFFICE O/P EST LOW 20 MIN: CPT | Mod: TH

## 2024-12-12 PROCEDURE — 76816 OB US FOLLOW-UP PER FETUS: CPT

## 2024-12-12 RX ORDER — HYDROCORTISONE ACETATE 25 MG/1
25 SUPPOSITORY RECTAL
Qty: 1 | Refills: 1 | Status: ACTIVE | COMMUNITY
Start: 2024-12-12 | End: 1900-01-01

## 2024-12-14 LAB
FERRITIN SERPL-MCNC: 51 NG/ML
FOLATE SERPL-MCNC: 18.8 NG/ML
GP B STREP DNA SPEC QL NAA+PROBE: DETECTED
HCT VFR BLD CALC: 37.9 %
HGB BLD-MCNC: 11.5 G/DL
HIV1+2 AB SPEC QL IA.RAPID: NONREACTIVE
IRON SATN MFR SERPL: 17 %
IRON SERPL-MCNC: 73 UG/DL
MCHC RBC-ENTMCNC: 24 PG
MCHC RBC-ENTMCNC: 30.3 G/DL
MCV RBC AUTO: 79 FL
PLATELET # BLD AUTO: 166 K/UL
RBC # BLD: 4.8 M/UL
RBC # FLD: 17 %
SOURCE GBS: NORMAL
T PALLIDUM AB SER QL IA: NEGATIVE
TIBC SERPL-MCNC: 417 UG/DL
UIBC SERPL-MCNC: 344 UG/DL
VIT B12 SERPL-MCNC: 941 PG/ML
WBC # FLD AUTO: 9.23 K/UL

## 2024-12-16 ENCOUNTER — APPOINTMENT (OUTPATIENT)
Dept: ANTEPARTUM | Facility: CLINIC | Age: 39
End: 2024-12-16
Payer: MEDICAID

## 2024-12-16 ENCOUNTER — ASOB RESULT (OUTPATIENT)
Age: 39
End: 2024-12-16

## 2024-12-16 ENCOUNTER — APPOINTMENT (OUTPATIENT)
Dept: MATERNAL FETAL MEDICINE | Facility: CLINIC | Age: 39
End: 2024-12-16
Payer: MEDICAID

## 2024-12-16 PROCEDURE — 76818 FETAL BIOPHYS PROFILE W/NST: CPT

## 2024-12-16 PROCEDURE — G0108 DIAB MANAGE TRN  PER INDIV: CPT | Mod: 95

## 2024-12-19 ENCOUNTER — APPOINTMENT (OUTPATIENT)
Dept: ANTEPARTUM | Facility: CLINIC | Age: 39
End: 2024-12-19

## 2024-12-19 ENCOUNTER — APPOINTMENT (OUTPATIENT)
Dept: OBGYN | Facility: CLINIC | Age: 39
End: 2024-12-19
Payer: MEDICAID

## 2024-12-19 VITALS — BODY MASS INDEX: 32.56 KG/M2 | SYSTOLIC BLOOD PRESSURE: 139 MMHG | DIASTOLIC BLOOD PRESSURE: 87 MMHG | WEIGHT: 178 LBS

## 2024-12-19 VITALS — DIASTOLIC BLOOD PRESSURE: 87 MMHG | SYSTOLIC BLOOD PRESSURE: 127 MMHG

## 2024-12-19 PROCEDURE — 99213 OFFICE O/P EST LOW 20 MIN: CPT | Mod: TH,25

## 2024-12-19 PROCEDURE — 76818 FETAL BIOPHYS PROFILE W/NST: CPT

## 2024-12-22 ENCOUNTER — INPATIENT (INPATIENT)
Facility: HOSPITAL | Age: 39
LOS: 2 days | Discharge: HOME | End: 2024-12-25
Attending: OBSTETRICS & GYNECOLOGY | Admitting: OBSTETRICS & GYNECOLOGY
Payer: MEDICAID

## 2024-12-22 VITALS
SYSTOLIC BLOOD PRESSURE: 144 MMHG | TEMPERATURE: 98 F | WEIGHT: 171.96 LBS | RESPIRATION RATE: 18 BRPM | DIASTOLIC BLOOD PRESSURE: 91 MMHG | HEART RATE: 87 BPM | HEIGHT: 62 IN

## 2024-12-22 DIAGNOSIS — O24.419 GESTATIONAL DIABETES MELLITUS IN PREGNANCY, UNSPECIFIED CONTROL: ICD-10-CM

## 2024-12-22 LAB
ALBUMIN SERPL ELPH-MCNC: 3.1 G/DL — LOW (ref 3.3–5)
ALP SERPL-CCNC: 315 U/L — HIGH (ref 40–120)
ALT FLD-CCNC: 218 U/L — HIGH (ref 4–33)
ANION GAP SERPL CALC-SCNC: 14 MMOL/L — SIGNIFICANT CHANGE UP (ref 7–14)
AST SERPL-CCNC: 106 U/L — HIGH (ref 4–32)
BILIRUB SERPL-MCNC: 0.3 MG/DL — SIGNIFICANT CHANGE UP (ref 0.2–1.2)
BLD GP AB SCN SERPL QL: NEGATIVE — SIGNIFICANT CHANGE UP
BUN SERPL-MCNC: 8 MG/DL — SIGNIFICANT CHANGE UP (ref 7–23)
CALCIUM SERPL-MCNC: 9.5 MG/DL — SIGNIFICANT CHANGE UP (ref 8.4–10.5)
CHLORIDE SERPL-SCNC: 102 MMOL/L — SIGNIFICANT CHANGE UP (ref 98–107)
CO2 SERPL-SCNC: 19 MMOL/L — LOW (ref 22–31)
CREAT SERPL-MCNC: 0.55 MG/DL — SIGNIFICANT CHANGE UP (ref 0.5–1.3)
EGFR: 120 ML/MIN/1.73M2 — SIGNIFICANT CHANGE UP
GLUCOSE BLDC GLUCOMTR-MCNC: 99 MG/DL — SIGNIFICANT CHANGE UP (ref 70–99)
GLUCOSE SERPL-MCNC: 84 MG/DL — SIGNIFICANT CHANGE UP (ref 70–99)
HCT VFR BLD CALC: 35.9 % — SIGNIFICANT CHANGE UP (ref 34.5–45)
HGB BLD-MCNC: 11.4 G/DL — LOW (ref 11.5–15.5)
IANC: 4.62 K/UL — SIGNIFICANT CHANGE UP (ref 1.8–7.4)
LDH SERPL L TO P-CCNC: 258 U/L — HIGH (ref 135–225)
MCHC RBC-ENTMCNC: 23.8 PG — LOW (ref 27–34)
MCHC RBC-ENTMCNC: 31.8 G/DL — LOW (ref 32–36)
MCV RBC AUTO: 74.9 FL — LOW (ref 80–100)
PLATELET # BLD AUTO: 216 K/UL — SIGNIFICANT CHANGE UP (ref 150–400)
POTASSIUM SERPL-MCNC: 3.9 MMOL/L — SIGNIFICANT CHANGE UP (ref 3.5–5.3)
POTASSIUM SERPL-SCNC: 3.9 MMOL/L — SIGNIFICANT CHANGE UP (ref 3.5–5.3)
PROT SERPL-MCNC: 6.6 G/DL — SIGNIFICANT CHANGE UP (ref 6–8.3)
RBC # BLD: 4.79 M/UL — SIGNIFICANT CHANGE UP (ref 3.8–5.2)
RBC # FLD: 16.1 % — HIGH (ref 10.3–14.5)
RH IG SCN BLD-IMP: POSITIVE — SIGNIFICANT CHANGE UP
RH IG SCN BLD-IMP: POSITIVE — SIGNIFICANT CHANGE UP
SODIUM SERPL-SCNC: 135 MMOL/L — SIGNIFICANT CHANGE UP (ref 135–145)
URATE SERPL-MCNC: 5.3 MG/DL — SIGNIFICANT CHANGE UP (ref 2.5–7)
WBC # BLD: 7.37 K/UL — SIGNIFICANT CHANGE UP (ref 3.8–10.5)
WBC # FLD AUTO: 7.37 K/UL — SIGNIFICANT CHANGE UP (ref 3.8–10.5)

## 2024-12-22 PROCEDURE — 59025 FETAL NON-STRESS TEST: CPT | Mod: 26

## 2024-12-22 RX ORDER — SODIUM CHLORIDE 9 MG/ML
1000 INJECTION, SOLUTION INTRAMUSCULAR; INTRAVENOUS; SUBCUTANEOUS
Refills: 0 | Status: DISCONTINUED | OUTPATIENT
Start: 2024-12-22 | End: 2024-12-23

## 2024-12-22 RX ORDER — OXYTOCIN IN 5 % DEXTROSE 20/500ML
167 PLASTIC BAG, INJECTION (ML) INTRAVENOUS
Qty: 30 | Refills: 0 | Status: DISCONTINUED | OUTPATIENT
Start: 2024-12-22 | End: 2024-12-23

## 2024-12-22 RX ORDER — SODIUM CHLORIDE 9 MG/ML
1000 INJECTION, SOLUTION INTRAVENOUS
Refills: 0 | Status: DISCONTINUED | OUTPATIENT
Start: 2024-12-22 | End: 2024-12-23

## 2024-12-22 RX ORDER — AMPICILLIN TRIHYDRATE 125 MG/5ML
1 SUSPENSION, RECONSTITUTED, ORAL (ML) ORAL EVERY 4 HOURS
Refills: 0 | Status: DISCONTINUED | OUTPATIENT
Start: 2024-12-22 | End: 2024-12-23

## 2024-12-22 RX ORDER — INFLUENZA A VIRUS A/WISCONSIN/588/2019 (H1N1) RECOMBINANT HEMAGGLUTININ ANTIGEN, INFLUENZA A VIRUS A/DARWIN/6/2021 (H3N2) RECOMBINANT HEMAGGLUTININ ANTIGEN, INFLUENZA B VIRUS B/AUSTRIA/1359417/2021 RECOMBINANT HEMAGGLUTININ ANTIGEN, AND INFLUENZA B VIRUS B/PHUKET/3073/2013 RECOMBINANT HEMAGGLUTININ ANTIGEN 45; 45; 45; 45 UG/.5ML; UG/.5ML; UG/.5ML; UG/.5ML
0.5 INJECTION INTRAMUSCULAR ONCE
Refills: 0 | Status: DISCONTINUED | OUTPATIENT
Start: 2024-12-22 | End: 2024-12-25

## 2024-12-22 RX ORDER — AMPICILLIN TRIHYDRATE 125 MG/5ML
2 SUSPENSION, RECONSTITUTED, ORAL (ML) ORAL ONCE
Refills: 0 | Status: COMPLETED | OUTPATIENT
Start: 2024-12-22 | End: 2024-12-22

## 2024-12-22 RX ORDER — CITRIC ACID/SODIUM CITRATE 334-500MG
15 SOLUTION, ORAL ORAL EVERY 6 HOURS
Refills: 0 | Status: DISCONTINUED | OUTPATIENT
Start: 2024-12-22 | End: 2024-12-23

## 2024-12-22 RX ORDER — CHLORHEXIDINE GLUCONATE 1.2 MG/ML
1 RINSE ORAL DAILY
Refills: 0 | Status: DISCONTINUED | OUTPATIENT
Start: 2024-12-22 | End: 2024-12-23

## 2024-12-22 RX ADMIN — Medication 200 GRAM(S): at 21:39

## 2024-12-22 RX ADMIN — SODIUM CHLORIDE 125 MILLILITER(S): 9 INJECTION, SOLUTION INTRAVENOUS at 21:39

## 2024-12-22 RX ADMIN — CHLORHEXIDINE GLUCONATE 1 APPLICATION(S): 1.2 RINSE ORAL at 21:40

## 2024-12-22 NOTE — OB PROVIDER H&P - ASSESSMENT
A/P: 39y GP at _ weeks GA presents to L&D for .   -Admit to L&D  -Consent  -Admission labs  -CLD or NPO, except ice chips   -IV fluids or Rotating IV fluids (LR/D5LR)  -Labor: Intact/*ROM. Latent/Active labor. [place] *. Induce labor with ______.  -Fetus: Cat I tracing. Continuous toco and fetal monitoring.   -GBS: Negative, no GBS ppx required   -Analgesia: epi PRN  -DVT ppx: Ambulate and SCD's while in bed     Discussed with Dr. Tapia, attending    Valerie Estrada, PGY1 A/P: 39y  at 38.3 weeks GA presents to L&D for IOL for GDMA2 and ICP.  -Admit to L&D  -Consent signed  -Admission labs  -HELLP labs sent d/t labile BP and headache today  NPO, except ice chips   -Rotating IV fluids (LR/D5LR)  -Labor: Intact. Not in labor. No contractions. Induce labor with PO cytotec x 1 dose at 3am, then pitocin 2x2 at 6am  -Fetus: Reactive tracing. Continuous toco and fetal monitoring.   -GBS: positive to start Ampicillin  -Analgesia: epi PRN  -DVT ppx: Ambulate and SCD's while in bed   -2u on hold d/t AMA and h/o hemorrhage s/p D&C    Discussed with Dr. Tapia, attending    Valerie Estrada, PGY1 A/P: 39y  at 37.3 weeks GA presents to L&D for IOL for GDMA2 and ICP.  -Admit to L&D  -Consent signed  -Admission labs  -HELLP labs sent d/t labile BP and headache today  NPO, except ice chips   -Rotating IV fluids (LR/D5LR)  -Labor: Intact. Not in labor. No contractions. Induce labor with PO cytotec x 1 dose at 3am, then pitocin 2x2 at 6am  -Fetus: Reactive tracing. Continuous toco and fetal monitoring.   -GBS: positive to start Ampicillin  -Analgesia: epi PRN  -DVT ppx: Ambulate and SCD's while in bed   -2u on hold d/t AMA and h/o hemorrhage s/p D&C    Discussed with Dr. Tapia, attending    Valerie Estrada, PGY1

## 2024-12-22 NOTE — OB RN PATIENT PROFILE - FUNCTIONAL ASSESSMENT - DAILY ACTIVITY SCORE HIDDEN
Pt  brought in by mother for  antibiotics treatment due to + clamydia/ gonorrhea findings. Pt endorsing no complaints 24

## 2024-12-22 NOTE — OB PROVIDER H&P - NSHPPHYSICALEXAM_GEN_ALL_CORE
T(C): 36.8 (12-22-24 @ 20:50), Max: 36.8 (12-22-24 @ 20:50)  HR: 87 (12-22-24 @ 21:02) (87 - 87)  BP: 144/91 (12-22-24 @ 21:02) (144/91 - 144/91)  RR: 18 (12-22-24 @ 20:50) (18 - 18)  SpO2: --  Gen: NAD, well-appearing   Lungs: CTAB  Heart: RRR   Abd: Soft, gravid  SVE:    Bedside sono:  FHT:  [place]: T(C): 36.8 (12-22-24 @ 20:50), Max: 36.8 (12-22-24 @ 20:50)  HR: 87 (12-22-24 @ 21:02) (87 - 87)  BP: 144/91 (12-22-24 @ 21:02) (144/91 - 144/91)  RR: 18 (12-22-24 @ 20:50) (18 - 18)  SpO2: --  Gen: NAD, well-appearing   Abd: Soft, gravid  SVE: 3/50/-3  Bedside sono: vertex  FHT: baseline   [place]: no ctx T(C): 36.8 (12-22-24 @ 20:50), Max: 36.8 (12-22-24 @ 20:50)  HR: 87 (12-22-24 @ 21:02) (87 - 87)  BP: 144/91 (12-22-24 @ 21:02) (144/91 - 144/91)  RR: 18 (12-22-24 @ 20:50) (18 - 18)  SpO2: --  Gen: NAD, well-appearing   Abd: Soft, gravid  SVE: 3/50/-3  Bedside sono: vertex  FHT: baseline 135bpm, moderate variability, +accels, -decels  [place]: no ctx

## 2024-12-22 NOTE — OB RN PATIENT PROFILE - NSMATERNALFETALCONCERNS_OBGYN_ALL_OB_FT
Maternal/Fetal Alert  AMA , GDM , CHOLESTASIS OF PREGNANCY S/P PLAN OF CARE WITH MFM (11/11/2024 )  FETAL BILATERAL RENAL DILATION . NIPS LOW RISK MALE - REFERRAL TO PEDS UROLOGY 12/3/2024; requires follow up   Anabelle Allen RN 11/13/2024  IOL 12/22/2024

## 2024-12-22 NOTE — OB PROVIDER H&P - NSLOWPPHRISK_OBGYN_A_OB
No previous uterine incision/[place] Pregnancy/Less than or equal to 4 previous vaginal births/No known bleeding disorder

## 2024-12-22 NOTE — OB PROVIDER H&P - HISTORY OF PRESENT ILLNESS
39y GP at _ weeks GA presents to L&D for . Patient denies vaginal bleeding, contractions and leakage of fluid. She endorses good fetal movement. Denies fevers, chills, nausea and vomiting. No other complaints at this time. Prenatal course is significant for:     Prenatal course uncomplicated.    RICK:      POB:  PGYN: denies fibroids, ovarian cysts, STD hx, or abnormal PAPs   PMH: Denies  PSH: Denies  SH: Denies EtOH, tobacco and illicit drug use during this pregnancy; feels safe at home   Psych Hx: denies hx of anxiety or depression  Meds: PNVs  Allergies: NKDA    EFW:    GBS:              39y  at 37.3 weeks GA presents to L&D for IOL for GDMA2 on 24u NPH qhs and ICP on ursodiol 300mg TID. Patient denies itching today. Patient states FS wnl at home. Patient denies vaginal bleeding, contractions and leakage of fluid. She endorses good fetal movement. Denies fevers, chills, nausea and vomiting. No other complaints at this time.   RICK: 2025    POB: GDMA2 on 24u NPH (FS wnl), ICP (BA 14.9 latest) on ursodiol 300mg TID    delivery at 36-37w per pt, c/b ICP and GDMA1. 7#4   SAB s/p D&C c/b hemorrhage requiring 4u pRBC   ectopic pregnancy (pt unsure which side) s/p MTX  PGYN: denies fibroids, ovarian cysts, STD hx, or abnormal PAPs   PMH: recurrent UTIs (not in pregnancy)  PSH: D&C  SH: Denies EtOH, tobacco and illicit drug use during this pregnancy; feels safe at home   Psych Hx: denies hx of anxiety or depression  Meds: PNVs, ursodiol 300mg TID, NPH 24u qhs  Allergies: NKDA    EFW: 3161g (extrapolated from sono 2561g on )    GBS: positive

## 2024-12-23 ENCOUNTER — TRANSCRIPTION ENCOUNTER (OUTPATIENT)
Age: 39
End: 2024-12-23

## 2024-12-23 ENCOUNTER — APPOINTMENT (OUTPATIENT)
Dept: ANTEPARTUM | Facility: CLINIC | Age: 39
End: 2024-12-23

## 2024-12-23 LAB
ALBUMIN SERPL ELPH-MCNC: 3 G/DL — LOW (ref 3.3–5)
ALP SERPL-CCNC: 321 U/L — HIGH (ref 40–120)
ALT FLD-CCNC: 186 U/L — HIGH (ref 4–33)
ANION GAP SERPL CALC-SCNC: 14 MMOL/L — SIGNIFICANT CHANGE UP (ref 7–14)
ANISOCYTOSIS BLD QL: SLIGHT — SIGNIFICANT CHANGE UP
APPEARANCE UR: CLEAR — SIGNIFICANT CHANGE UP
APTT BLD: 29.2 SEC — SIGNIFICANT CHANGE UP (ref 24.5–35.6)
AST SERPL-CCNC: 101 U/L — HIGH (ref 4–32)
BACTERIA # UR AUTO: NEGATIVE /HPF — SIGNIFICANT CHANGE UP
BASOPHILS # BLD AUTO: 0.03 K/UL — SIGNIFICANT CHANGE UP (ref 0–0.2)
BASOPHILS # BLD AUTO: 0.07 K/UL — SIGNIFICANT CHANGE UP (ref 0–0.2)
BASOPHILS NFR BLD AUTO: 0.4 % — SIGNIFICANT CHANGE UP (ref 0–2)
BASOPHILS NFR BLD AUTO: 0.9 % — SIGNIFICANT CHANGE UP (ref 0–2)
BILIRUB SERPL-MCNC: 0.3 MG/DL — SIGNIFICANT CHANGE UP (ref 0.2–1.2)
BILIRUB UR-MCNC: NEGATIVE — SIGNIFICANT CHANGE UP
BUN SERPL-MCNC: 8 MG/DL — SIGNIFICANT CHANGE UP (ref 7–23)
CALCIUM SERPL-MCNC: 9.1 MG/DL — SIGNIFICANT CHANGE UP (ref 8.4–10.5)
CAST: 0 /LPF — SIGNIFICANT CHANGE UP (ref 0–4)
CHLORIDE SERPL-SCNC: 107 MMOL/L — SIGNIFICANT CHANGE UP (ref 98–107)
CO2 SERPL-SCNC: 18 MMOL/L — LOW (ref 22–31)
COLOR SPEC: YELLOW — SIGNIFICANT CHANGE UP
CREAT ?TM UR-MCNC: 42 MG/DL — SIGNIFICANT CHANGE UP
CREAT SERPL-MCNC: 0.53 MG/DL — SIGNIFICANT CHANGE UP (ref 0.5–1.3)
DIFF PNL FLD: NEGATIVE — SIGNIFICANT CHANGE UP
EGFR: 121 ML/MIN/1.73M2 — SIGNIFICANT CHANGE UP
EOSINOPHIL # BLD AUTO: 0 K/UL — SIGNIFICANT CHANGE UP (ref 0–0.5)
EOSINOPHIL # BLD AUTO: 0.06 K/UL — SIGNIFICANT CHANGE UP (ref 0–0.5)
EOSINOPHIL NFR BLD AUTO: 0 % — SIGNIFICANT CHANGE UP (ref 0–6)
EOSINOPHIL NFR BLD AUTO: 0.9 % — SIGNIFICANT CHANGE UP (ref 0–6)
FIBRINOGEN PPP-MCNC: 676 MG/DL — HIGH (ref 200–465)
GIANT PLATELETS BLD QL SMEAR: PRESENT — SIGNIFICANT CHANGE UP
GLUCOSE BLDC GLUCOMTR-MCNC: 81 MG/DL — SIGNIFICANT CHANGE UP (ref 70–99)
GLUCOSE BLDC GLUCOMTR-MCNC: 83 MG/DL — SIGNIFICANT CHANGE UP (ref 70–99)
GLUCOSE BLDC GLUCOMTR-MCNC: 99 MG/DL — SIGNIFICANT CHANGE UP (ref 70–99)
GLUCOSE BLDC GLUCOMTR-MCNC: 99 MG/DL — SIGNIFICANT CHANGE UP (ref 70–99)
GLUCOSE SERPL-MCNC: 92 MG/DL — SIGNIFICANT CHANGE UP (ref 70–99)
GLUCOSE UR QL: NEGATIVE MG/DL — SIGNIFICANT CHANGE UP
HCT VFR BLD CALC: 36.7 % — SIGNIFICANT CHANGE UP (ref 34.5–45)
HGB BLD-MCNC: 11.2 G/DL — LOW (ref 11.5–15.5)
IANC: 4.12 K/UL — SIGNIFICANT CHANGE UP (ref 1.8–7.4)
IMM GRANULOCYTES NFR BLD AUTO: 1.5 % — HIGH (ref 0–0.9)
INR BLD: 0.91 RATIO — SIGNIFICANT CHANGE UP (ref 0.85–1.16)
KETONES UR-MCNC: NEGATIVE MG/DL — SIGNIFICANT CHANGE UP
LDH SERPL L TO P-CCNC: 193 U/L — SIGNIFICANT CHANGE UP (ref 135–225)
LEUKOCYTE ESTERASE UR-ACNC: NEGATIVE — SIGNIFICANT CHANGE UP
LYMPHOCYTES # BLD AUTO: 0.92 K/UL — LOW (ref 1–3.3)
LYMPHOCYTES # BLD AUTO: 12.5 % — LOW (ref 13–44)
LYMPHOCYTES # BLD AUTO: 2.09 K/UL — SIGNIFICANT CHANGE UP (ref 1–3.3)
LYMPHOCYTES # BLD AUTO: 30.7 % — SIGNIFICANT CHANGE UP (ref 13–44)
MAGNESIUM SERPL-MCNC: 5.3 MG/DL — HIGH (ref 1.6–2.6)
MANUAL SMEAR VERIFICATION: SIGNIFICANT CHANGE UP
MCHC RBC-ENTMCNC: 23.1 PG — LOW (ref 27–34)
MCHC RBC-ENTMCNC: 30.5 G/DL — LOW (ref 32–36)
MCV RBC AUTO: 75.8 FL — LOW (ref 80–100)
MICROCYTES BLD QL: SLIGHT — SIGNIFICANT CHANGE UP
MONOCYTES # BLD AUTO: 0.2 K/UL — SIGNIFICANT CHANGE UP (ref 0–0.9)
MONOCYTES # BLD AUTO: 0.4 K/UL — SIGNIFICANT CHANGE UP (ref 0–0.9)
MONOCYTES NFR BLD AUTO: 2.7 % — SIGNIFICANT CHANGE UP (ref 2–14)
MONOCYTES NFR BLD AUTO: 5.9 % — SIGNIFICANT CHANGE UP (ref 2–14)
MYELOCYTES NFR BLD: 0.9 % — HIGH (ref 0–0)
NEUTROPHILS # BLD AUTO: 4.12 K/UL — SIGNIFICANT CHANGE UP (ref 1.8–7.4)
NEUTROPHILS # BLD AUTO: 5.73 K/UL — SIGNIFICANT CHANGE UP (ref 1.8–7.4)
NEUTROPHILS NFR BLD AUTO: 60.6 % — SIGNIFICANT CHANGE UP (ref 43–77)
NEUTROPHILS NFR BLD AUTO: 77.7 % — HIGH (ref 43–77)
NITRITE UR-MCNC: NEGATIVE — SIGNIFICANT CHANGE UP
NRBC # BLD: 0 /100 WBCS — SIGNIFICANT CHANGE UP (ref 0–0)
NRBC # FLD: 0 K/UL — SIGNIFICANT CHANGE UP (ref 0–0)
PH UR: 6.5 — SIGNIFICANT CHANGE UP (ref 5–8)
PLAT MORPH BLD: ABNORMAL
PLATELET # BLD AUTO: 201 K/UL — SIGNIFICANT CHANGE UP (ref 150–400)
PLATELET COUNT - ESTIMATE: NORMAL — SIGNIFICANT CHANGE UP
POLYCHROMASIA BLD QL SMEAR: SLIGHT — SIGNIFICANT CHANGE UP
POTASSIUM SERPL-MCNC: 3.7 MMOL/L — SIGNIFICANT CHANGE UP (ref 3.5–5.3)
POTASSIUM SERPL-SCNC: 3.7 MMOL/L — SIGNIFICANT CHANGE UP (ref 3.5–5.3)
PROT ?TM UR-MCNC: 41 MG/DL — SIGNIFICANT CHANGE UP
PROT SERPL-MCNC: 6.4 G/DL — SIGNIFICANT CHANGE UP (ref 6–8.3)
PROT UR-MCNC: 30 MG/DL
PROT/CREAT UR-RTO: 1 RATIO — HIGH (ref 0–0.2)
PROTHROM AB SERPL-ACNC: 10.6 SEC — SIGNIFICANT CHANGE UP (ref 9.9–13.4)
RBC # BLD: 4.84 M/UL — SIGNIFICANT CHANGE UP (ref 3.8–5.2)
RBC # FLD: 16.4 % — HIGH (ref 10.3–14.5)
RBC BLD AUTO: ABNORMAL
RBC CASTS # UR COMP ASSIST: 1 /HPF — SIGNIFICANT CHANGE UP (ref 0–4)
SMUDGE CELLS # BLD: PRESENT — SIGNIFICANT CHANGE UP
SODIUM SERPL-SCNC: 139 MMOL/L — SIGNIFICANT CHANGE UP (ref 135–145)
SP GR SPEC: 1.01 — SIGNIFICANT CHANGE UP (ref 1–1.03)
SQUAMOUS # UR AUTO: 1 /HPF — SIGNIFICANT CHANGE UP (ref 0–5)
URATE SERPL-MCNC: 5.7 MG/DL — SIGNIFICANT CHANGE UP (ref 2.5–7)
UROBILINOGEN FLD QL: 0.2 MG/DL — SIGNIFICANT CHANGE UP (ref 0.2–1)
VARIANT LYMPHS # BLD: 5.3 % — SIGNIFICANT CHANGE UP (ref 0–6)
WBC # BLD: 6.8 K/UL — SIGNIFICANT CHANGE UP (ref 3.8–10.5)
WBC # FLD AUTO: 6.8 K/UL — SIGNIFICANT CHANGE UP (ref 3.8–10.5)
WBC UR QL: 1 /HPF — SIGNIFICANT CHANGE UP (ref 0–5)

## 2024-12-23 PROCEDURE — 59409 OBSTETRICAL CARE: CPT | Mod: U7

## 2024-12-23 RX ORDER — BENZOCAINE/MENTH/CETYLPYRD CL 15 MG-4 MG
1 LOZENGE MUCOUS MEMBRANE EVERY 6 HOURS
Refills: 0 | Status: DISCONTINUED | OUTPATIENT
Start: 2024-12-23 | End: 2024-12-25

## 2024-12-23 RX ORDER — KETOROLAC TROMETHAMINE 30 MG/ML
30 INJECTION INTRAMUSCULAR; INTRAVENOUS ONCE
Refills: 0 | Status: DISCONTINUED | OUTPATIENT
Start: 2024-12-23 | End: 2024-12-23

## 2024-12-23 RX ORDER — IBUPROFEN 200 MG
600 TABLET ORAL EVERY 6 HOURS
Refills: 0 | Status: DISCONTINUED | OUTPATIENT
Start: 2024-12-23 | End: 2024-12-25

## 2024-12-23 RX ORDER — SODIUM CHLORIDE 9 MG/ML
500 INJECTION, SOLUTION INTRAVENOUS ONCE
Refills: 0 | Status: COMPLETED | OUTPATIENT
Start: 2024-12-23 | End: 2024-12-23

## 2024-12-23 RX ORDER — SIMETHICONE 125 MG/1
80 CAPSULE, LIQUID FILLED ORAL EVERY 4 HOURS
Refills: 0 | Status: DISCONTINUED | OUTPATIENT
Start: 2024-12-23 | End: 2024-12-25

## 2024-12-23 RX ORDER — DIPHENHYDRAMINE HCL 25 MG
25 TABLET ORAL EVERY 6 HOURS
Refills: 0 | Status: DISCONTINUED | OUTPATIENT
Start: 2024-12-23 | End: 2024-12-25

## 2024-12-23 RX ORDER — SODIUM CHLORIDE 9 MG/ML
3 INJECTION, SOLUTION INTRAMUSCULAR; INTRAVENOUS; SUBCUTANEOUS EVERY 8 HOURS
Refills: 0 | Status: DISCONTINUED | OUTPATIENT
Start: 2024-12-23 | End: 2024-12-25

## 2024-12-23 RX ORDER — ERTAPENEM SODIUM 1 G/1
1000 INJECTION, POWDER, LYOPHILIZED, FOR SOLUTION INTRAMUSCULAR; INTRAVENOUS EVERY 24 HOURS
Refills: 0 | Status: COMPLETED | OUTPATIENT
Start: 2024-12-23 | End: 2024-12-24

## 2024-12-23 RX ORDER — PNV/FERROUS FUM/DOCUSATE/FOLIC 90-50-1MG
1 TABLET, EXTENDED RELEASE ORAL DAILY
Refills: 0 | Status: DISCONTINUED | OUTPATIENT
Start: 2024-12-23 | End: 2024-12-25

## 2024-12-23 RX ORDER — SODIUM CHLORIDE 9 MG/ML
1000 INJECTION, SOLUTION INTRAVENOUS
Refills: 0 | Status: DISCONTINUED | OUTPATIENT
Start: 2024-12-23 | End: 2024-12-23

## 2024-12-23 RX ORDER — IBUPROFEN 200 MG
600 TABLET ORAL EVERY 6 HOURS
Refills: 0 | Status: COMPLETED | OUTPATIENT
Start: 2024-12-23 | End: 2025-11-21

## 2024-12-23 RX ORDER — MAGNESIUM SULFATE 500 MG/ML
4 INJECTION, SOLUTION INTRAMUSCULAR; INTRAVENOUS ONCE
Refills: 0 | Status: COMPLETED | OUTPATIENT
Start: 2024-12-23 | End: 2024-12-23

## 2024-12-23 RX ORDER — OXYTOCIN IN 5 % DEXTROSE 20/500ML
167 PLASTIC BAG, INJECTION (ML) INTRAVENOUS
Qty: 30 | Refills: 0 | Status: DISCONTINUED | OUTPATIENT
Start: 2024-12-23 | End: 2024-12-25

## 2024-12-23 RX ORDER — ACETAMINOPHEN 80 MG/.8ML
975 SOLUTION/ DROPS ORAL
Refills: 0 | Status: DISCONTINUED | OUTPATIENT
Start: 2024-12-23 | End: 2024-12-25

## 2024-12-23 RX ORDER — MAGNESIUM HYDROXIDE 400 MG/5ML
30 SUSPENSION, ORAL (FINAL DOSE FORM) ORAL
Refills: 0 | Status: DISCONTINUED | OUTPATIENT
Start: 2024-12-23 | End: 2024-12-25

## 2024-12-23 RX ORDER — ACETAMINOPHEN 80 MG/.8ML
1000 SOLUTION/ DROPS ORAL ONCE
Refills: 0 | Status: DISCONTINUED | OUTPATIENT
Start: 2024-12-23 | End: 2024-12-23

## 2024-12-23 RX ORDER — MAGNESIUM SULFATE 500 MG/ML
2 INJECTION, SOLUTION INTRAMUSCULAR; INTRAVENOUS
Qty: 40 | Refills: 0 | Status: DISCONTINUED | OUTPATIENT
Start: 2024-12-23 | End: 2024-12-23

## 2024-12-23 RX ORDER — HYDROCORTISONE 1 %
1 CREAM (GRAM) TOPICAL EVERY 6 HOURS
Refills: 0 | Status: DISCONTINUED | OUTPATIENT
Start: 2024-12-23 | End: 2024-12-25

## 2024-12-23 RX ORDER — WITCH HAZEL 0.5 ML/ML
1 SOLUTION TOPICAL EVERY 4 HOURS
Refills: 0 | Status: DISCONTINUED | OUTPATIENT
Start: 2024-12-23 | End: 2024-12-25

## 2024-12-23 RX ORDER — OXYCODONE HCL 15 MG
5 TABLET ORAL ONCE
Refills: 0 | Status: DISCONTINUED | OUTPATIENT
Start: 2024-12-23 | End: 2024-12-25

## 2024-12-23 RX ORDER — OXYTOCIN IN 5 % DEXTROSE 20/500ML
PLASTIC BAG, INJECTION (ML) INTRAVENOUS
Qty: 30 | Refills: 0 | Status: DISCONTINUED | OUTPATIENT
Start: 2024-12-23 | End: 2024-12-23

## 2024-12-23 RX ORDER — MAGNESIUM SULFATE 500 MG/ML
2 INJECTION, SOLUTION INTRAMUSCULAR; INTRAVENOUS
Qty: 40 | Refills: 0 | Status: DISCONTINUED | OUTPATIENT
Start: 2024-12-23 | End: 2024-12-24

## 2024-12-23 RX ORDER — OXYTOCIN IN 5 % DEXTROSE 20/500ML
20 PLASTIC BAG, INJECTION (ML) INTRAVENOUS ONCE
Refills: 0 | Status: COMPLETED | OUTPATIENT
Start: 2024-12-23 | End: 2024-12-23

## 2024-12-23 RX ORDER — OXYCODONE HCL 15 MG
5 TABLET ORAL
Refills: 0 | Status: DISCONTINUED | OUTPATIENT
Start: 2024-12-23 | End: 2024-12-25

## 2024-12-23 RX ORDER — CLOSTRIDIUM TETANI TOXOID ANTIGEN (FORMALDEHYDE INACTIVATED), CORYNEBACTERIUM DIPHTHERIAE TOXOID ANTIGEN (FORMALDEHYDE INACTIVATED), BORDETELLA PERTUSSIS TOXOID ANTIGEN (GLUTARALDEHYDE INACTIVATED), BORDETELLA PERTUSSIS FILAMENTOUS HEMAGGLUTININ ANTIGEN (FORMALDEHYDE INACTIVATED), BORDETELLA PERTUSSIS PERTACTIN ANTIGEN, AND BORDETELLA PERTUSSIS FIMBRIAE 2/3 ANTIGEN 5; 2; 2.5; 5; 3; 5 [LF]/.5ML; [LF]/.5ML; UG/.5ML; UG/.5ML; UG/.5ML; UG/.5ML
0.5 INJECTION, SUSPENSION INTRAMUSCULAR ONCE
Refills: 0 | Status: DISCONTINUED | OUTPATIENT
Start: 2024-12-23 | End: 2024-12-25

## 2024-12-23 RX ORDER — LANOLIN
1 OINTMENT (GRAM) TOPICAL EVERY 6 HOURS
Refills: 0 | Status: DISCONTINUED | OUTPATIENT
Start: 2024-12-23 | End: 2024-12-25

## 2024-12-23 RX ORDER — SODIUM CHLORIDE 9 MG/ML
1000 INJECTION, SOLUTION INTRAVENOUS
Refills: 0 | Status: DISCONTINUED | OUTPATIENT
Start: 2024-12-23 | End: 2024-12-24

## 2024-12-23 RX ADMIN — Medication 108 GRAM(S): at 01:24

## 2024-12-23 RX ADMIN — SODIUM CHLORIDE 1000 MILLILITER(S): 9 INJECTION, SOLUTION INTRAVENOUS at 16:51

## 2024-12-23 RX ADMIN — MAGNESIUM SULFATE 50 GM/HR: 500 INJECTION, SOLUTION INTRAMUSCULAR; INTRAVENOUS at 19:39

## 2024-12-23 RX ADMIN — Medication 108 GRAM(S): at 05:37

## 2024-12-23 RX ADMIN — Medication 20 UNIT(S): at 14:29

## 2024-12-23 RX ADMIN — ACETAMINOPHEN 975 MILLIGRAM(S): 80 SOLUTION/ DROPS ORAL at 18:01

## 2024-12-23 RX ADMIN — Medication 2 MILLIUNIT(S)/MIN: at 06:09

## 2024-12-23 RX ADMIN — Medication 167 MILLIUNIT(S)/MIN: at 16:32

## 2024-12-23 RX ADMIN — SODIUM CHLORIDE 3 MILLILITER(S): 9 INJECTION, SOLUTION INTRAMUSCULAR; INTRAVENOUS; SUBCUTANEOUS at 22:00

## 2024-12-23 RX ADMIN — MAGNESIUM SULFATE 300 GRAM(S): 500 INJECTION, SOLUTION INTRAMUSCULAR; INTRAVENOUS at 11:50

## 2024-12-23 RX ADMIN — KETOROLAC TROMETHAMINE 30 MILLIGRAM(S): 30 INJECTION INTRAMUSCULAR; INTRAVENOUS at 16:59

## 2024-12-23 RX ADMIN — MAGNESIUM SULFATE 50 GM/HR: 500 INJECTION, SOLUTION INTRAMUSCULAR; INTRAVENOUS at 12:12

## 2024-12-23 RX ADMIN — ACETAMINOPHEN 975 MILLIGRAM(S): 80 SOLUTION/ DROPS ORAL at 23:56

## 2024-12-23 RX ADMIN — Medication 108 GRAM(S): at 09:50

## 2024-12-23 RX ADMIN — KETOROLAC TROMETHAMINE 30 MILLIGRAM(S): 30 INJECTION INTRAMUSCULAR; INTRAVENOUS at 16:29

## 2024-12-23 NOTE — DISCHARGE NOTE OB - CARE PLAN
1 Principal Discharge DX:	Vaginal delivery  Assessment and plan of treatment:	Routine PP care  Pelvic rest

## 2024-12-23 NOTE — DISCHARGE NOTE OB - PATIENT PORTAL LINK FT
You can access the FollowMyHealth Patient Portal offered by [facility] by registering at the following website: [email or web address]. By joining [employer]’s FollowMyHealth portal, you will also be able to view your health information using other applications (apps) compatible with our system.

## 2024-12-23 NOTE — PROVIDER CONTACT NOTE (OTHER) - ASSESSMENT
bp laying down 139/74, HR 98  bp sitting up 141/89,   upon standing patient reported dizziness and HR reached 133. Fundus firm @ light rubra lochia.  bp laying down 139/74, HR 98  bp sitting up 141/89,   upon standing patient reported dizziness and HR reached 133.

## 2024-12-23 NOTE — PROVIDER CONTACT NOTE (OTHER) - BACKGROUND
patient failed pp orthostatics. TOD of viable male @1409. patient failed pp orthostatics. TOD of viable male @1409. .

## 2024-12-23 NOTE — DISCHARGE NOTE OB - FINANCIAL ASSISTANCE
[facility] provides services at a reduced cost to those who are determined to be eligible through [facility]’s financial assistance program. Information regarding [facility]’s financial assistance program can be found by going to [email or web address] or by calling [phone].

## 2024-12-23 NOTE — OB RN DELIVERY SUMMARY - NSSELHIDDEN_OBGYN_ALL_OB_FT
[NS_DeliveryAttending1_OBGYN_ALL_OB_FT:[id number]==],[NS_DeliveryRN_OBGYN_ALL_OB_FT:[id number]=],[NS_CirculateRN2_OBGYN_ALL_OB_FT:[id number]=]

## 2024-12-23 NOTE — CHART NOTE - NSCHARTNOTEFT_GEN_A_CORE
Patient discussed at safety rounds.  BA 40 in 10/2024, AST/ALT elevated at that time  BA 15 in 11/2024, AST/ALT normalized.    On admission AST/ALT elevated. Patient with mild range BPs. P/C 1.0.  [facility] Dr. Mendoza recommended Mg for suspected PEC w/ SF.    Plan  - Mg gtt  - PT to be updated by [facility] [place]    D/jett Sun

## 2024-12-23 NOTE — OB RN DELIVERY SUMMARY - NS_SEPSISRSKCALC_OBGYN_ALL_OB_FT
EOS calculated successfully. EOS Risk Factor: 0.5/1000 live births ([facility] national incidence); GA=37w4d; Temp=98.6; ROM=2.867; GBS='Negative'; Antibiotics='GBS specific antibiotics > 2 hrs prior to birth'

## 2024-12-23 NOTE — DISCHARGE NOTE OB - MATERIALS PROVIDED
Vaccinations/[facility]  Screening Program/  Immunization Record/Breastfeeding Log/Bottle Feeding Log/Breastfeeding Mother’s Support Group Information/Guide to Postpartum Care/[facility] Hearing Screen Program/Back To Sleep Handout/Shaken Baby Prevention Handout/Breastfeeding Guide and Packet/Birth Certificate Instructions/Discharge Medication Information for Patients and Families Pocket Guide/Tdap Vaccination (VIS Pub Date: 2012)

## 2024-12-23 NOTE — OB PROVIDER LABOR PROGRESS NOTE - NS_SUBJECTIVE/OBJECTIVE_OBGYN_ALL_OB_FT
PGY4 Labor Note    Patient seen and evaluated at bedside.  Reports rectal pressure.     T(C): 36.9 (12-23-24 @ 05:40), Max: 36.9 (12-23-24 @ 05:40)  HR: 81 (12-23-24 @ 05:40) (81 - 91)  BP: 123/72 (12-23-24 @ 05:40) (120/65 - 144/91)  RR: 18 (12-23-24 @ 05:40) (14 - 18)  SpO2: --

## 2024-12-23 NOTE — OB NEONATOLOGY/PEDIATRICIAN DELIVERY SUMMARY - NSPEDSNEONOTESA_OBGYN_ALL_OB_FT
Peds called to LDR for category II tracing and SPEC requiring Mag 37.4 wk AGA male born via IOL to a 40 y/o  mother. Maternal history of hemorrhage s/p SAB requiring x4 pRBCs. Prenatal history significant for AMA, GDMA2, cholestasis on Ursodiol and SPEC. Fetal US found to have b/l renal dilation, f/u with peds urology recommended. Maternal labs include Blood Type O+ , HIV -, RPR NR, Rubella immune, Hep B -, GBS+ on  (received amp x3). AROM at 1117 on  with clear fluids (ROM hours: 2H 52M).  Baby emerged vigorous, crying, was w/d/s/s with APGARS of 7/9. Nuchal x1. Resuscitation included stimulation. Mom plans to initiate breastfeeding and formula feed, consents Hep B vaccine and consents circ. Highest maternal temp: 36.9C. EOS 0.06.    : 24  TOB: 14:09  Weight: 2930g    Physical Exam:  Gen: no acute distress, +grimace  HEENT: +mild artificial cranial deformation, anterior fontanel open soft and flat, nondysmorphic facies, no cleft lip/palate, ears normal set, no ear pits or tags, nares clinically patent  Resp: Normal respiratory effort without grunting or retractions, good air entry b/l, clear to auscultation bilaterally  Cardio: Present S1/S2, regular rate and rhythm, no murmurs  Abd: soft, non tender, non distended, umbilical cord with 3 vessels  Neuro: +palmar and plantar grasp, +suck, +arnel, normal tone  Extremities: negative myers and ortolani maneuvers, moving all extremities, no clavicular crepitus or stepoff  Skin: pink, warm  Genitals: Normal male anatomy, testicles palpable in scrotum b/l, Steven 1, anus patent

## 2024-12-23 NOTE — CHART NOTE - NSCHARTNOTEFT_GEN_A_CORE
Patient confirmed her height and weight.       Electronically signed by Vicky Alfaro on 3/22/23 at 1:30 PM EDT OB Attending Progress Note    Patient seen and evaluated at bedside.  Feeling some rectal pressure     T(C): 36.8 (12-23-24 @ 12:06), Max: 37.0 (12-23-24 @ 09:43)  HR: 89 (12-23-24 @ 13:40) (74 - 95)  BP: 132/73 (12-23-24 @ 13:14) (115/78 - 144/91)  RR: 18 (12-23-24 @ 12:06) (14 - 18)  SpO2: 99% (12-23-24 @ 13:40) (94% - 99%)    SVE: 10/100/+1    EFM: cat I  [place]:  contractions q 2-3 mins    A/P 39y P1  admitted for IOL cholestasis, GDMA2      -Labor: currently on pitocin, will start pushing  -Fetal Status: reassuring  -GBS: neg  -Analgesia: epidural in place    ISAIAS Leal MD

## 2024-12-23 NOTE — CHART NOTE - NSCHARTNOTEFT_GEN_A_CORE
OB Attending Progress Note    Patient seen and evaluated at bedside.  Denies complaints.  Comfortable w/ epidural.      T(C): 37.0 (12-23-24 @ 09:43), Max: 37.0 (12-23-24 @ 09:43)  HR: 86 (12-23-24 @ 11:18) (78 - 91)  BP: 123/86 (12-23-24 @ 11:18) (120/65 - 144/91)  RR: 16 (12-23-24 @ 09:43) (14 - 18)  SpO2: 98% (12-23-24 @ 11:15) (96% - 98%)    SVE: 4/70/-3. AROM clear    EFM: Cat I  [place]:  contractions q 3-4 mins    A/P 39y P1 admitted for IOL cholestasis and GDMA2      -Labor: s/p misoprostol, currently on pitocin. AROM - will position in high fowlers  -Fetal Status:  overall reassuring  -GBS: negative  -Analgesia: epidural in place   -labile BP, P/C 1 and transaminitis  - patient meets criteria for sPEC - will start magnesium for seizure prophylaxis. Plan discussed with patient     ISAIAS Leal MD

## 2024-12-23 NOTE — OB PROVIDER DELIVERY SUMMARY - NSPROVIDERDELIVERYNOTE_OBGYN_ALL_OB_FT
Delivery of liveborn male infant from OA position, APGARS 7/9  Head, shoulders and body delivered easily  Tight nuchal cord x1 noted   Placenta delivered spontaneously and intact   1st degree laceration repaired with chromic suture, adequate hemostasis noted  Fundus firm CALEB atony noted treated with manual evacuation of clots, rectal cytotec and IM pitocin

## 2024-12-23 NOTE — DISCHARGE NOTE OB - NS MD DC FALL RISK RISK
For information on Fall & Injury Prevention, visit: [email or web address] OR  [email or web address] OR  https://www.cdc.gov/steadi/patient.html

## 2024-12-23 NOTE — OB PROVIDER LABOR PROGRESS NOTE - ASSESSMENT
A/P: 39y P1 admitted for IOL 2/2 A2 and ICP  - reports rectal pressure, likely 2/2 descent of fetal station   - c/w pitocin   - continue with pitocin   - continuous monitoring/toco   - epi for pain     Jasmin Ahmed PGY4

## 2024-12-23 NOTE — OB RN DELIVERY SUMMARY - BABY A: APGAR 1 MIN RESP RATE, DELIVERY
Please let pt know:    Her U/S abdomen showed some mild dilatation of the common duct. Her last set of labs did not show high liver numbers but I will order a repeat to make sure her liver numbers are normal. Pending those labs, if they are abnormal we can do an MRCP (MRI of the abdomen) or if she is having abdominal pain we can do it as well.     It also showed a stable left hepatic cyst and right hepatic lobe hemangioma.    I cannot refill the xanax but can send in her citalopram. We can put in a referral for Psychiatry to take over xanax prescriptions if they feel it is appropriate. If she has an appointment with them scheduled, I can do 1 week at a time until she sees them but only if appt is scheduled and they take over going forward if they feel it is appropriate.     Thanks,     Dr. Martin   (2) good, crying

## 2024-12-23 NOTE — DISCHARGE NOTE OB - CARE PROVIDER_API CALL
Sample-Nayla Ravi  Obstetrics and Gynecology  [address], [address]  [place] [address]  Phone: [phone]  Fax: [phone]  Follow Up Time:

## 2024-12-24 LAB
ALBUMIN SERPL ELPH-MCNC: 2.8 G/DL — LOW (ref 3.3–5)
ALP SERPL-CCNC: 280 U/L — HIGH (ref 40–120)
ALT FLD-CCNC: 191 U/L — HIGH (ref 4–33)
ANION GAP SERPL CALC-SCNC: 15 MMOL/L — HIGH (ref 7–14)
AST SERPL-CCNC: 120 U/L — HIGH (ref 4–32)
BASOPHILS # BLD AUTO: 0.03 K/UL — SIGNIFICANT CHANGE UP (ref 0–0.2)
BASOPHILS # BLD AUTO: 0.05 K/UL — SIGNIFICANT CHANGE UP (ref 0–0.2)
BASOPHILS NFR BLD AUTO: 0.3 % — SIGNIFICANT CHANGE UP (ref 0–2)
BASOPHILS NFR BLD AUTO: 0.6 % — SIGNIFICANT CHANGE UP (ref 0–2)
BILIRUB SERPL-MCNC: 0.3 MG/DL — SIGNIFICANT CHANGE UP (ref 0.2–1.2)
BUN SERPL-MCNC: 6 MG/DL — LOW (ref 7–23)
CALCIUM SERPL-MCNC: 7.4 MG/DL — LOW (ref 8.4–10.5)
CHLORIDE SERPL-SCNC: 100 MMOL/L — SIGNIFICANT CHANGE UP (ref 98–107)
CO2 SERPL-SCNC: 18 MMOL/L — LOW (ref 22–31)
CREAT SERPL-MCNC: 0.59 MG/DL — SIGNIFICANT CHANGE UP (ref 0.5–1.3)
EGFR: 118 ML/MIN/1.73M2 — SIGNIFICANT CHANGE UP
EOSINOPHIL # BLD AUTO: 0.01 K/UL — SIGNIFICANT CHANGE UP (ref 0–0.5)
EOSINOPHIL # BLD AUTO: 0.02 K/UL — SIGNIFICANT CHANGE UP (ref 0–0.5)
EOSINOPHIL NFR BLD AUTO: 0.1 % — SIGNIFICANT CHANGE UP (ref 0–6)
EOSINOPHIL NFR BLD AUTO: 0.2 % — SIGNIFICANT CHANGE UP (ref 0–6)
GLUCOSE SERPL-MCNC: 94 MG/DL — SIGNIFICANT CHANGE UP (ref 70–99)
HCT VFR BLD CALC: 34.8 % — SIGNIFICANT CHANGE UP (ref 34.5–45)
HCT VFR BLD CALC: 35.2 % — SIGNIFICANT CHANGE UP (ref 34.5–45)
HGB BLD-MCNC: 11 G/DL — LOW (ref 11.5–15.5)
HGB BLD-MCNC: 11.2 G/DL — LOW (ref 11.5–15.5)
IANC: 6.73 K/UL — SIGNIFICANT CHANGE UP (ref 1.8–7.4)
IANC: 8.61 K/UL — HIGH (ref 1.8–7.4)
IMM GRANULOCYTES NFR BLD AUTO: 0.9 % — SIGNIFICANT CHANGE UP (ref 0–0.9)
IMM GRANULOCYTES NFR BLD AUTO: 0.9 % — SIGNIFICANT CHANGE UP (ref 0–0.9)
LDH SERPL L TO P-CCNC: 286 U/L — HIGH (ref 135–225)
LYMPHOCYTES # BLD AUTO: 1.19 K/UL — SIGNIFICANT CHANGE UP (ref 1–3.3)
LYMPHOCYTES # BLD AUTO: 1.3 K/UL — SIGNIFICANT CHANGE UP (ref 1–3.3)
LYMPHOCYTES # BLD AUTO: 11.5 % — LOW (ref 13–44)
LYMPHOCYTES # BLD AUTO: 14.9 % — SIGNIFICANT CHANGE UP (ref 13–44)
MAGNESIUM SERPL-MCNC: 6 MG/DL — HIGH (ref 1.6–2.6)
MAGNESIUM SERPL-MCNC: 6.4 MG/DL — HIGH (ref 1.6–2.6)
MAGNESIUM SERPL-MCNC: 6.6 MG/DL — HIGH (ref 1.6–2.6)
MCHC RBC-ENTMCNC: 23.3 PG — LOW (ref 27–34)
MCHC RBC-ENTMCNC: 23.6 PG — LOW (ref 27–34)
MCHC RBC-ENTMCNC: 31.3 G/DL — LOW (ref 32–36)
MCHC RBC-ENTMCNC: 32.2 G/DL — SIGNIFICANT CHANGE UP (ref 32–36)
MCV RBC AUTO: 73.4 FL — LOW (ref 80–100)
MCV RBC AUTO: 74.4 FL — LOW (ref 80–100)
MONOCYTES # BLD AUTO: 0.44 K/UL — SIGNIFICANT CHANGE UP (ref 0–0.9)
MONOCYTES # BLD AUTO: 0.57 K/UL — SIGNIFICANT CHANGE UP (ref 0–0.9)
MONOCYTES NFR BLD AUTO: 4.2 % — SIGNIFICANT CHANGE UP (ref 2–14)
MONOCYTES NFR BLD AUTO: 6.5 % — SIGNIFICANT CHANGE UP (ref 2–14)
NEUTROPHILS # BLD AUTO: 6.73 K/UL — SIGNIFICANT CHANGE UP (ref 1.8–7.4)
NEUTROPHILS # BLD AUTO: 8.61 K/UL — HIGH (ref 1.8–7.4)
NEUTROPHILS NFR BLD AUTO: 76.9 % — SIGNIFICANT CHANGE UP (ref 43–77)
NEUTROPHILS NFR BLD AUTO: 83 % — HIGH (ref 43–77)
NRBC # BLD: 0 /100 WBCS — SIGNIFICANT CHANGE UP (ref 0–0)
NRBC # BLD: 0 /100 WBCS — SIGNIFICANT CHANGE UP (ref 0–0)
NRBC # FLD: 0 K/UL — SIGNIFICANT CHANGE UP (ref 0–0)
NRBC # FLD: 0 K/UL — SIGNIFICANT CHANGE UP (ref 0–0)
PLATELET # BLD AUTO: 187 K/UL — SIGNIFICANT CHANGE UP (ref 150–400)
PLATELET # BLD AUTO: 199 K/UL — SIGNIFICANT CHANGE UP (ref 150–400)
POTASSIUM SERPL-MCNC: 3.7 MMOL/L — SIGNIFICANT CHANGE UP (ref 3.5–5.3)
POTASSIUM SERPL-SCNC: 3.7 MMOL/L — SIGNIFICANT CHANGE UP (ref 3.5–5.3)
PROT SERPL-MCNC: 6 G/DL — SIGNIFICANT CHANGE UP (ref 6–8.3)
RBC # BLD: 4.73 M/UL — SIGNIFICANT CHANGE UP (ref 3.8–5.2)
RBC # BLD: 4.74 M/UL — SIGNIFICANT CHANGE UP (ref 3.8–5.2)
RBC # FLD: 15.8 % — HIGH (ref 10.3–14.5)
RBC # FLD: 16.2 % — HIGH (ref 10.3–14.5)
SODIUM SERPL-SCNC: 133 MMOL/L — LOW (ref 135–145)
T PALLIDUM AB TITR SER: NEGATIVE — SIGNIFICANT CHANGE UP
URATE SERPL-MCNC: 6.3 MG/DL — SIGNIFICANT CHANGE UP (ref 2.5–7)
WBC # BLD: 10.37 K/UL — SIGNIFICANT CHANGE UP (ref 3.8–10.5)
WBC # BLD: 8.75 K/UL — SIGNIFICANT CHANGE UP (ref 3.8–10.5)
WBC # FLD AUTO: 10.37 K/UL — SIGNIFICANT CHANGE UP (ref 3.8–10.5)
WBC # FLD AUTO: 8.75 K/UL — SIGNIFICANT CHANGE UP (ref 3.8–10.5)

## 2024-12-24 RX ADMIN — Medication 600 MILLIGRAM(S): at 12:20

## 2024-12-24 RX ADMIN — Medication 1 TABLET(S): at 12:20

## 2024-12-24 RX ADMIN — Medication 600 MILLIGRAM(S): at 12:50

## 2024-12-24 RX ADMIN — Medication 600 MILLIGRAM(S): at 04:00

## 2024-12-24 RX ADMIN — SODIUM CHLORIDE 3 MILLILITER(S): 9 INJECTION, SOLUTION INTRAMUSCULAR; INTRAVENOUS; SUBCUTANEOUS at 06:26

## 2024-12-24 RX ADMIN — ERTAPENEM SODIUM 120 MILLIGRAM(S): 1 INJECTION, POWDER, LYOPHILIZED, FOR SOLUTION INTRAMUSCULAR; INTRAVENOUS at 00:58

## 2024-12-24 RX ADMIN — ACETAMINOPHEN 975 MILLIGRAM(S): 80 SOLUTION/ DROPS ORAL at 01:17

## 2024-12-24 RX ADMIN — ACETAMINOPHEN 975 MILLIGRAM(S): 80 SOLUTION/ DROPS ORAL at 21:00

## 2024-12-24 RX ADMIN — SODIUM CHLORIDE 3 MILLILITER(S): 9 INJECTION, SOLUTION INTRAMUSCULAR; INTRAVENOUS; SUBCUTANEOUS at 12:00

## 2024-12-24 RX ADMIN — Medication 600 MILLIGRAM(S): at 03:17

## 2024-12-24 RX ADMIN — SODIUM CHLORIDE 3 MILLILITER(S): 9 INJECTION, SOLUTION INTRAMUSCULAR; INTRAVENOUS; SUBCUTANEOUS at 21:00

## 2024-12-24 RX ADMIN — ACETAMINOPHEN 975 MILLIGRAM(S): 80 SOLUTION/ DROPS ORAL at 20:22

## 2024-12-24 RX ADMIN — MAGNESIUM SULFATE 50 GM/HR: 500 INJECTION, SOLUTION INTRAMUSCULAR; INTRAVENOUS at 07:31

## 2024-12-24 NOTE — CHART NOTE - NSCHARTNOTEFT_GEN_A_CORE
Late documentation due to clinical duties. Patient evaluated at 11pm.    S: 38yo PPD#1 s/p  c/b sPEC on Mg. Called to bedside by RN due to fever of 38.4 at 10:50pm. At bedside, patient c/o feeling warm. Patient states she felt dizzy briefly when getting out of bed earlier, but it resolved. Patient overall feels very tired. Patient states her pain is well controlled. She is tolerating a regular diet and passing flatus. She had a watery bowel movement earlier in the night. She is voiding spontaneously. Denies CP/SOB. Denies lightheadedness/dizziness currently. Denies N/V.    O:  Vitals:  Vital Signs Last 24 Hrs  T(C): 38.1 (24 Dec 2024 02:00), Max: 38.4 (23 Dec 2024 22:50)  T(F): 100.6 (24 Dec 2024 02:00), Max: 101.1 (23 Dec 2024 22:50)  HR: 96 (24 Dec 2024 02:00) (74 - 119)  BP: 126/84 (24 Dec 2024 02:00) (115/78 - 144/90)  BP(mean): 91 (24 Dec 2024 02:00) (89 - 91)  RR: 18 (24 Dec 2024 02:00) (16 - 18)  SpO2: 100% (24 Dec 2024 02:00) (89% - 100%)        MEDICATIONS  (STANDING):  acetaminophen     Tablet .. 975 milliGRAM(s) Oral <User Schedule>  diphtheria/tetanus/pertussis (acellular) Vaccine (Adacel) 0.5 milliLiter(s) IntraMuscular once  ibuprofen  Tablet. 600 milliGRAM(s) Oral every 6 hours  influenza   Vaccine 0.5 milliLiter(s) IntraMuscular once  lactated ringers. 1000 milliLiter(s) (50 mL/Hr) IV Continuous <Continuous>  magnesium sulfate Infusion 2 Gm/Hr (50 mL/Hr) IV Continuous <Continuous>  oxytocin Infusion 167 milliUNIT(s)/Min (167 mL/Hr) IV Continuous <Continuous>  prenatal multivitamin 1 Tablet(s) Oral daily  sodium chloride 0.9% lock flush 3 milliLiter(s) IV Push every 8 hours      Labs:  Blood type: O Positive  Rubella IgG: RPR: Negative                          11.2[L]   10.37 >-----------< 199    (  @ 00:40 )             34.8                        11.2[L]   6.80 >-----------< 201    (  @ 11:35 )             36.7                        11.4[L]   7.37 >-----------< 216    (  @ 21:00 )             35.9    24 @ 11:35      139  |  107  |  8   ----------------------------<  92  3.7   |  18[L]  |  0.53    24 @ 21:00      135  |  102  |  8   ----------------------------<  84  3.9   |  19[L]  |  0.55        Ca    9.1      23 Dec 2024 11:35  Ca    9.5      22 Dec 2024 21:00  Mg     6.00[H]       Mg     5.30[H]         TPro  6.4  /  Alb  3.0[L]  /  TBili  0.3  /  DBili  x   /  AST  101[H]  /  ALT  186[H]  /  AlkPhos  321[H]  24 @ 11:35  TPro  6.6  /  Alb  3.1[L]  /  TBili  0.3  /  DBili  x   /  AST  106[H]  /  ALT  218[H]  /  AlkPhos  315[H]  24 @ 21:00          Physical Exam:  General: NAD  Cardiac: normal S1/S2, RRR  Resp: CTAB, no wheezes, rales, rhonchi  Abdomen: soft, mildly tender, non-distended, fundus firm  Vaginal: Lochia wnl  Extremities: No erythema/edema    A/P: 38yo PPD#0 s/p  c/b sPEC/Mg. . Patient with new onset fever up to 38.4 C. Physical exam benign.    #Suspected endometritis  - Patient febrile to 38.4 C  - Administer Tylenol for fever  - Draw BCx, UCx  - Treat with Invanz x1 dose  - F/u AM CBC, trend WBC  - Stat CBC: WBC 6.8->10.37    #sPEC  - Magnesium for seizure prophylaxis, cont 24h postpartum  - BP's well controlled on this shift: 120-130s/70-80s  - HELLP labs notable for AST//186, P/C: 1.0    #Routine postpartum care  - Pain well controlled, continue current pain regimen  - Increase ambulation, SCDs when not ambulating  - Continue regular diet    D/w Dr. Lewis, attending  Valerie Estrada PGY1 Late documentation due to clinical duties. Patient evaluated at 11pm.    S: 40yo PPD#1 s/p  c/b sPEC on Mg. Called to bedside by RN due to fever of 38.4 at 10:50pm. At bedside, patient c/o feeling warm. Patient states she felt dizzy briefly when getting out of bed earlier, but it resolved. Patient denies flu-like symptoms. Denies cough, sore throat, dyspnea. Patient overall feels very tired. Patient states her pain is well controlled. She is tolerating a regular diet and passing flatus. She had a watery bowel movement earlier in the night. She is voiding spontaneously. Denies lightheadedness/dizziness currently. Denies N/V. Patient denies HA, CP, SOB, changes or spots in vision, new swelling in the hands and face, or RUQ/epigastric pain.    O:  Vitals:  Vital Signs Last 24 Hrs  T(C): 38.1 (24 Dec 2024 02:00), Max: 38.4 (23 Dec 2024 22:50)  T(F): 100.6 (24 Dec 2024 02:00), Max: 101.1 (23 Dec 2024 22:50)  HR: 96 (24 Dec 2024 02:00) (74 - 119)  BP: 126/84 (24 Dec 2024 02:00) (115/78 - 144/90)  BP(mean): 91 (24 Dec 2024 02:00) (89 - 91)  RR: 18 (24 Dec 2024 02:00) (16 - 18)  SpO2: 100% (24 Dec 2024 02:00) (89% - 100%)        MEDICATIONS  (STANDING):  acetaminophen     Tablet .. 975 milliGRAM(s) Oral <User Schedule>  diphtheria/tetanus/pertussis (acellular) Vaccine (Adacel) 0.5 milliLiter(s) IntraMuscular once  ibuprofen  Tablet. 600 milliGRAM(s) Oral every 6 hours  influenza   Vaccine 0.5 milliLiter(s) IntraMuscular once  lactated ringers. 1000 milliLiter(s) (50 mL/Hr) IV Continuous <Continuous>  magnesium sulfate Infusion 2 Gm/Hr (50 mL/Hr) IV Continuous <Continuous>  oxytocin Infusion 167 milliUNIT(s)/Min (167 mL/Hr) IV Continuous <Continuous>  prenatal multivitamin 1 Tablet(s) Oral daily  sodium chloride 0.9% lock flush 3 milliLiter(s) IV Push every 8 hours      Labs:  Blood type: O Positive  Rubella IgG: RPR: Negative                          11.2[L]   10.37 >-----------< 199    (  @ 00:40 )             34.8                        11.2[L]   6.80 >-----------< 201    (  @ 11:35 )             36.7                        11.4[L]   7.37 >-----------< 216    (  @ 21:00 )             35.9    24 @ 11:35      139  |  107  |  8   ----------------------------<  92  3.7   |  18[L]  |  0.53    24 @ 21:00      135  |  102  |  8   ----------------------------<  84  3.9   |  19[L]  |  0.55        Ca    9.1      23 Dec 2024 11:35  Ca    9.5      22 Dec 2024 21:00  Mg     6.00[H]       Mg     5.30[H]         TPro  6.4  /  Alb  3.0[L]  /  TBili  0.3  /  DBili  x   /  AST  101[H]  /  ALT  186[H]  /  AlkPhos  321[H]  24 @ 11:35  TPro  6.6  /  Alb  3.1[L]  /  TBili  0.3  /  DBili  x   /  AST  106[H]  /  ALT  218[H]  /  AlkPhos  315[H]  24 @ 21:00          Physical Exam:  General: NAD  Cardiac: normal S1/S2, RRR  Resp: CTAB, no wheezes, rales, rhonchi  Abdomen: soft, mildly tender, non-distended, fundus firm  Vaginal: Lochia wnl  Extremities: No erythema/edema, DTR 2+ bilaterally in upper and lower extremities    A/P: 40yo PPD#0 s/p  c/b sPEC/Mg. . Patient with new onset fever up to 38.4 C. Physical exam benign.    #Suspected endometritis  - Patient febrile to 38.4 C  - Administer Tylenol for fever  - Draw BCx, UCx  - Treat with Invanz x1 dose  - F/u AM CBC, trend WBC  - Stat CBC: WBC 6.8->10.37    #sPEC  - Magnesium for seizure prophylaxis, cont 24h postpartum  - BP's well controlled on this shift: 120-130s/70-80s  - HELLP labs notable for AST//186, P/C: 1.0    #Routine postpartum care  - Pain well controlled, continue current pain regimen  - Increase ambulation, SCDs when not ambulating  - Continue regular diet    D/w Dr. Lewis, attending  Valerie Esrtada PGY1

## 2024-12-25 VITALS
SYSTOLIC BLOOD PRESSURE: 137 MMHG | DIASTOLIC BLOOD PRESSURE: 79 MMHG | TEMPERATURE: 98 F | HEART RATE: 64 BPM | OXYGEN SATURATION: 100 % | RESPIRATION RATE: 17 BRPM

## 2024-12-25 LAB
ALBUMIN SERPL ELPH-MCNC: 2.8 G/DL — LOW (ref 3.3–5)
ALP SERPL-CCNC: 253 U/L — HIGH (ref 40–120)
ALT FLD-CCNC: 138 U/L — HIGH (ref 4–33)
ANION GAP SERPL CALC-SCNC: 12 MMOL/L — SIGNIFICANT CHANGE UP (ref 7–14)
AST SERPL-CCNC: 66 U/L — HIGH (ref 4–32)
BASOPHILS # BLD AUTO: 0.03 K/UL — SIGNIFICANT CHANGE UP (ref 0–0.2)
BASOPHILS NFR BLD AUTO: 0.4 % — SIGNIFICANT CHANGE UP (ref 0–2)
BILIRUB SERPL-MCNC: <0.2 MG/DL — SIGNIFICANT CHANGE UP (ref 0.2–1.2)
BUN SERPL-MCNC: 11 MG/DL — SIGNIFICANT CHANGE UP (ref 7–23)
CALCIUM SERPL-MCNC: 8.1 MG/DL — LOW (ref 8.4–10.5)
CHLORIDE SERPL-SCNC: 101 MMOL/L — SIGNIFICANT CHANGE UP (ref 98–107)
CO2 SERPL-SCNC: 19 MMOL/L — LOW (ref 22–31)
CREAT SERPL-MCNC: 0.57 MG/DL — SIGNIFICANT CHANGE UP (ref 0.5–1.3)
CULTURE RESULTS: NO GROWTH — SIGNIFICANT CHANGE UP
EGFR: 119 ML/MIN/1.73M2 — SIGNIFICANT CHANGE UP
EOSINOPHIL # BLD AUTO: 0.12 K/UL — SIGNIFICANT CHANGE UP (ref 0–0.5)
EOSINOPHIL NFR BLD AUTO: 1.6 % — SIGNIFICANT CHANGE UP (ref 0–6)
GLUCOSE SERPL-MCNC: 93 MG/DL — SIGNIFICANT CHANGE UP (ref 70–99)
HCT VFR BLD CALC: 32.8 % — LOW (ref 34.5–45)
HGB BLD-MCNC: 10 G/DL — LOW (ref 11.5–15.5)
IANC: 3.94 K/UL — SIGNIFICANT CHANGE UP (ref 1.8–7.4)
IMM GRANULOCYTES NFR BLD AUTO: 1.1 % — HIGH (ref 0–0.9)
LDH SERPL L TO P-CCNC: 300 U/L — HIGH (ref 135–225)
LYMPHOCYTES # BLD AUTO: 2.62 K/UL — SIGNIFICANT CHANGE UP (ref 1–3.3)
LYMPHOCYTES # BLD AUTO: 35.7 % — SIGNIFICANT CHANGE UP (ref 13–44)
MCHC RBC-ENTMCNC: 23.3 PG — LOW (ref 27–34)
MCHC RBC-ENTMCNC: 30.5 G/DL — LOW (ref 32–36)
MCV RBC AUTO: 76.5 FL — LOW (ref 80–100)
MONOCYTES # BLD AUTO: 0.55 K/UL — SIGNIFICANT CHANGE UP (ref 0–0.9)
MONOCYTES NFR BLD AUTO: 7.5 % — SIGNIFICANT CHANGE UP (ref 2–14)
NEUTROPHILS # BLD AUTO: 3.94 K/UL — SIGNIFICANT CHANGE UP (ref 1.8–7.4)
NEUTROPHILS NFR BLD AUTO: 53.7 % — SIGNIFICANT CHANGE UP (ref 43–77)
NRBC # BLD: 0 /100 WBCS — SIGNIFICANT CHANGE UP (ref 0–0)
NRBC # FLD: 0 K/UL — SIGNIFICANT CHANGE UP (ref 0–0)
PLATELET # BLD AUTO: 194 K/UL — SIGNIFICANT CHANGE UP (ref 150–400)
POTASSIUM SERPL-MCNC: 4.1 MMOL/L — SIGNIFICANT CHANGE UP (ref 3.5–5.3)
POTASSIUM SERPL-SCNC: 4.1 MMOL/L — SIGNIFICANT CHANGE UP (ref 3.5–5.3)
PROT SERPL-MCNC: 6.3 G/DL — SIGNIFICANT CHANGE UP (ref 6–8.3)
RBC # BLD: 4.29 M/UL — SIGNIFICANT CHANGE UP (ref 3.8–5.2)
RBC # FLD: 16.4 % — HIGH (ref 10.3–14.5)
SODIUM SERPL-SCNC: 132 MMOL/L — LOW (ref 135–145)
SPECIMEN SOURCE: SIGNIFICANT CHANGE UP
URATE SERPL-MCNC: 6.4 MG/DL — SIGNIFICANT CHANGE UP (ref 2.5–7)
WBC # BLD: 7.34 K/UL — SIGNIFICANT CHANGE UP (ref 3.8–10.5)
WBC # FLD AUTO: 7.34 K/UL — SIGNIFICANT CHANGE UP (ref 3.8–10.5)

## 2024-12-25 RX ADMIN — Medication 600 MILLIGRAM(S): at 00:14

## 2024-12-25 RX ADMIN — SODIUM CHLORIDE 3 MILLILITER(S): 9 INJECTION, SOLUTION INTRAMUSCULAR; INTRAVENOUS; SUBCUTANEOUS at 06:10

## 2024-12-25 RX ADMIN — Medication 600 MILLIGRAM(S): at 12:14

## 2024-12-25 RX ADMIN — Medication 1 TABLET(S): at 12:14

## 2024-12-25 RX ADMIN — Medication 600 MILLIGRAM(S): at 06:10

## 2024-12-25 RX ADMIN — Medication 600 MILLIGRAM(S): at 00:45

## 2024-12-25 RX ADMIN — Medication 600 MILLIGRAM(S): at 05:39

## 2024-12-25 NOTE — PROGRESS NOTE ADULT - SUBJECTIVE AND OBJECTIVE BOX
OB Progress Note:  PPD#1    S: 40yo PPD#1 s/p  c/b sPEC on Mg. Patient was febrile overnight, see separate documentation. Was treated with Invanz x1 for suspected endometritis, and Tylenol/Motrin. Patient feels overall tired. Denies feeling feverish at this time. Pain is well controlled. She is tolerating a regular diet and passing flatus. She is voiding spontaneously, and ambulating without difficulty. Denies CP/SOB. Denies lightheadedness/dizziness. Denies N/V.     O:  Vitals:  Vital Signs Last 24 Hrs  T(C): 37.2 (24 Dec 2024 06:13), Max: 38.4 (23 Dec 2024 22:50)  T(F): 98.9 (24 Dec 2024 06:13), Max: 101.1 (23 Dec 2024 22:50)  HR: 63 (24 Dec 2024 06:13) (63 - 119)  BP: 119/73 (24 Dec 2024 06:13) (115/78 - 144/90)  BP(mean): 83 (24 Dec 2024 06:13) (83 - 91)  RR: 18 (24 Dec 2024 06:13) (16 - 18)  SpO2: 99% (24 Dec 2024 06:13) (89% - 100%)        MEDICATIONS  (STANDING):  acetaminophen     Tablet .. 975 milliGRAM(s) Oral <User Schedule>  diphtheria/tetanus/pertussis (acellular) Vaccine (Adacel) 0.5 milliLiter(s) IntraMuscular once  ibuprofen  Tablet. 600 milliGRAM(s) Oral every 6 hours  influenza   Vaccine 0.5 milliLiter(s) IntraMuscular once  lactated ringers. 1000 milliLiter(s) (50 mL/Hr) IV Continuous <Continuous>  magnesium sulfate Infusion 2 Gm/Hr (50 mL/Hr) IV Continuous <Continuous>  oxytocin Infusion 167 milliUNIT(s)/Min (167 mL/Hr) IV Continuous <Continuous>  prenatal multivitamin 1 Tablet(s) Oral daily  sodium chloride 0.9% lock flush 3 milliLiter(s) IV Push every 8 hours      Labs:  Blood type: O Positive  Rubella IgG: RPR: Negative                          11.0[L]   8.75 >-----------< 187    (  @ 06:00 )             35.2                        11.2[L]   10.37 >-----------< 199    (  @ 00:40 )             34.8                        11.2[L]   6.80 >-----------< 201    (  @ 11:35 )             36.7                        11.4[L]   7.37 >-----------< 216    (  @ 21:00 )             35.9    24 @ 11:35      139  |  107  |  8   ----------------------------<  92  3.7   |  18[L]  |  0.53    24 @ 21:00      135  |  102  |  8   ----------------------------<  84  3.9   |  19[L]  |  0.55        Ca    9.1      23 Dec 2024 11:35  Ca    9.5      22 Dec 2024 21:00  Mg     6.00[H]       Mg     5.30[H]         TPro  6.4  /  Alb  3.0[L]  /  TBili  0.3  /  DBili  x   /  AST  101[H]  /  ALT  186[H]  /  AlkPhos  321[H]  24 @ 11:35  TPro  6.6  /  Alb  3.1[L]  /  TBili  0.3  /  DBili  x   /  AST  106[H]  /  ALT  218[H]  /  AlkPhos  315[H]  24 @ 21:00          Physical Exam:  General: NAD  Abdomen: soft, non-tender, non-distended, fundus firm  Vaginal: Lochia wnl  Extremities: No erythema/edema    
OB Progress Note:  PPD#2    S: 38yo PPD#2 s/p  c/b sPEC s/p Mg. Patient feels well. Pain is well controlled. She is tolerating a regular diet and passing flatus. She is voiding spontaneously, and ambulating without difficulty. Denies CP/SOB. Denies lightheadedness/dizziness. Denies N/V. Denies fever/chills.    O:  Vitals:   Vital Signs Last 24 Hrs  T(C): 36.4 (25 Dec 2024 04:47), Max: 36.7 (24 Dec 2024 10:00)  T(F): 97.6 (25 Dec 2024 04:47), Max: 98 (24 Dec 2024 10:00)  HR: 62 (25 Dec 2024 04:47) (60 - 75)  BP: 118/71 (25 Dec 2024 04:47) (108/65 - 132/84)  BP(mean): --  RR: 18 (25 Dec 2024 04:47) (16 - 20)  SpO2: 100% (25 Dec 2024 04:47) (98% - 100%)    Parameters below as of 25 Dec 2024 04:47  Patient On (Oxygen Delivery Method): room air        MEDICATIONS  (STANDING):  acetaminophen     Tablet .. 975 milliGRAM(s) Oral <User Schedule>  diphtheria/tetanus/pertussis (acellular) Vaccine (Adacel) 0.5 milliLiter(s) IntraMuscular once  ibuprofen  Tablet. 600 milliGRAM(s) Oral every 6 hours  influenza   Vaccine 0.5 milliLiter(s) IntraMuscular once  oxytocin Infusion 167 milliUNIT(s)/Min (167 mL/Hr) IV Continuous <Continuous>  prenatal multivitamin 1 Tablet(s) Oral daily  sodium chloride 0.9% lock flush 3 milliLiter(s) IV Push every 8 hours    MEDICATIONS  (PRN):  benzocaine 20%/menthol 0.5% Spray 1 Spray(s) Topical every 6 hours PRN for Perineal discomfort  dibucaine 1% Ointment 1 Application(s) Topical every 6 hours PRN Perineal discomfort  diphenhydrAMINE 25 milliGRAM(s) Oral every 6 hours PRN Pruritus  hydrocortisone 1% Cream 1 Application(s) Topical every 6 hours PRN Moderate Pain (4-6)  lanolin Ointment 1 Application(s) Topical every 6 hours PRN nipple soreness  magnesium hydroxide Suspension 30 milliLiter(s) Oral two times a day PRN Constipation  oxyCODONE    IR 5 milliGRAM(s) Oral every 3 hours PRN Moderate to Severe Pain (4-10)  oxyCODONE    IR 5 milliGRAM(s) Oral once PRN Moderate to Severe Pain (4-10)  pramoxine 1%/zinc 5% Cream 1 Application(s) Topical every 4 hours PRN Moderate Pain (4-6)  simethicone 80 milliGRAM(s) Chew every 4 hours PRN Gas  witch hazel Pads 1 Application(s) Topical every 4 hours PRN Perineal discomfort      Labs:  Blood type: O Positive  Rubella IgG: RPR: Negative                          11.0[L]   8.75 >-----------< 187    (  @ 06:00 )             35.2                        11.2[L]   10.37 >-----------< 199    (  @ 00:40 )             34.8                        11.2[L]   6.80 >-----------< 201    (  @ 11:35 )             36.7                        11.4[L]   7.37 >-----------< 216    (  @ 21:00 )             35.9    24 @ 06:00      133[L]  |  100  |  6[L]  ----------------------------<  94  3.7   |  18[L]  |  0.59    24 @ 11:35      139  |  107  |  8   ----------------------------<  92  3.7   |  18[L]  |  0.53    24 @ 21:00      135  |  102  |  8   ----------------------------<  84  3.9   |  19[L]  |  0.55        Ca    7.4[L]      24 Dec 2024 06:00  Ca    9.1      23 Dec 2024 11:35  Ca    9.5      22 Dec 2024 21:00  Mg     6.60[H]     12-24  Mg     6.40[H]     12-  Mg     6.00[H]     12-  Mg     5.30[H]         TPro  6.0  /  Alb  2.8[L]  /  TBili  0.3  /  DBili  x   /  AST  120[H]  /  ALT  191[H]  /  AlkPhos  280[H]  24 @ 06:00  TPro  6.4  /  Alb  3.0[L]  /  TBili  0.3  /  DBili  x   /  AST  101[H]  /  ALT  186[H]  /  AlkPhos  321[H]  24 @ 11:35  TPro  6.6  /  Alb  3.1[L]  /  TBili  0.3  /  DBili  x   /  AST  106[H]  /  ALT  218[H]  /  AlkPhos  315[H]  24 @ 21:00          Physical Exam:  General: NAD  Abdomen: soft, non-tender, non-distended, fundus firm  Vaginal: Lochia wnl  Extremities: No erythema/edema

## 2024-12-25 NOTE — PROGRESS NOTE ADULT - ATTENDING COMMENTS
OB attending  pt seen and examined and agree with above  continue current management  continue to monitor  ZARA Cadet MD
Pt seen and examined and agree with above  T(C): 36.3 (12-25-24 @ 14:21), Max: 36.5 (12-25-24 @ 10:16)  HR: 69 (12-25-24 @ 14:21) (60 - 80)  BP: 137/84 (12-25-24 @ 14:21) (118/71 - 137/84)  RR: 18 (12-25-24 @ 14:21) (16 - 18)  SpO2: 100% (12-25-24 @ 14:21) (98% - 100%)                            10.0   7.34  )-----------( 194      ( 25 Dec 2024 05:41 )             32.8   12-25    132[L]  |  101  |  11  ----------------------------<  93  4.1   |  19[L]  |  0.57    Ca    8.1[L]      25 Dec 2024 05:41  Mg     6.60     12-24    TPro  6.3  /  Alb  2.8[L]  /  TBili  <0.2  /  DBili  x   /  AST  66[H]  /  ALT  138[H]  /  AlkPhos  253[H]  12-25  LIVER FUNCTIONS - ( 25 Dec 2024 05:41 )  Alb: 2.8 g/dL / Pro: 6.3 g/dL / ALK PHOS: 253 U/L / ALT: 138 U/L / AST: 66 U/L / GGT: x    stable for d/c home today  ZARA Cadet MD

## 2024-12-25 NOTE — PROGRESS NOTE ADULT - ASSESSMENT
A/P: 40yo PPD#2 s/p  c/b sPEC s/p Mg and suspected endometritis.  Patient is stable and doing well post-partum. VSS, AF.    #Suspected endometritis, resolved  - Patient febrile to 38.4 C  11:50p  - BCx: no growth at 24hr (prelim)  - F/u BCx, UCx  - S/p Invanz x1 dose  - WBC: 6.8->10.37->8.75  - F/u AM CBC    #sPEC  - S/p Magnesium for seizure prophylaxis (-)  - BP's well controlled overnight: 110-130s/70-80s  - HELLP labs notable for AST//186->120/191, P/C: 1.0  - F/u AM HELLP labs    #Routine postpartum care  - Pain well controlled, continue current pain regimen  - Increase ambulation, SCDs when not ambulating  - Continue regular diet  - Discharge planning     Valerie Estrada PGY1
A/P: 40yo PPD#1 s/p  c/b sPEC on Mg. Patient febrile overnight, with suspected endometritis, and treated with Invanz x1 and Tylenol/Motrin. Patient has since been afebrile. Patient is stable and doing well postpartum.     #Suspected endometritis  - Patient febrile to 38.4 C  11:50p  - F/u BCx, UCx  - S/p Invanz x1 dose  - WBC: 6.8->10.37  - F/u AM CBC    #sPEC  - Magnesium for seizure prophylaxis, cont 24h postpartum  - BP's well controlled overnight: 110-130s/70-80s  - HELLP labs notable for AST//186, P/C: 1.0  - F/u AM HELLP labs    #Routine postpartum care  - Pain well controlled, continue current pain regimen  - Increase ambulation, SCDs when not ambulating  - Continue regular diet    Valerie Estrada PGY1

## 2024-12-27 ENCOUNTER — APPOINTMENT (OUTPATIENT)
Dept: ANTEPARTUM | Facility: CLINIC | Age: 39
End: 2024-12-27

## 2024-12-27 ENCOUNTER — APPOINTMENT (OUTPATIENT)
Dept: OBGYN | Facility: CLINIC | Age: 39
End: 2024-12-27

## 2024-12-29 LAB
CULTURE RESULTS: SIGNIFICANT CHANGE UP
SPECIMEN SOURCE: SIGNIFICANT CHANGE UP

## 2024-12-30 ENCOUNTER — APPOINTMENT (OUTPATIENT)
Dept: OBGYN | Facility: CLINIC | Age: 39
End: 2024-12-30
Payer: MEDICAID

## 2024-12-30 ENCOUNTER — APPOINTMENT (OUTPATIENT)
Dept: ANTEPARTUM | Facility: CLINIC | Age: 39
End: 2024-12-30

## 2024-12-30 VITALS — SYSTOLIC BLOOD PRESSURE: 135 MMHG | DIASTOLIC BLOOD PRESSURE: 91 MMHG

## 2024-12-30 VITALS
BODY MASS INDEX: 30.18 KG/M2 | DIASTOLIC BLOOD PRESSURE: 89 MMHG | HEIGHT: 62 IN | WEIGHT: 164 LBS | SYSTOLIC BLOOD PRESSURE: 143 MMHG

## 2024-12-30 PROCEDURE — 99212 OFFICE O/P EST SF 10 MIN: CPT | Mod: TH

## 2025-01-02 ENCOUNTER — APPOINTMENT (OUTPATIENT)
Dept: ANTEPARTUM | Facility: CLINIC | Age: 40
End: 2025-01-02

## 2025-01-02 ENCOUNTER — APPOINTMENT (OUTPATIENT)
Dept: OBGYN | Facility: CLINIC | Age: 40
End: 2025-01-02

## 2025-01-06 ENCOUNTER — APPOINTMENT (OUTPATIENT)
Dept: ANTEPARTUM | Facility: CLINIC | Age: 40
End: 2025-01-06

## 2025-01-09 ENCOUNTER — NON-APPOINTMENT (OUTPATIENT)
Age: 40
End: 2025-01-09

## 2025-01-10 ENCOUNTER — APPOINTMENT (OUTPATIENT)
Dept: OBGYN | Facility: CLINIC | Age: 40
End: 2025-01-10

## 2025-01-10 ENCOUNTER — APPOINTMENT (OUTPATIENT)
Dept: ANTEPARTUM | Facility: CLINIC | Age: 40
End: 2025-01-10

## 2025-01-23 ENCOUNTER — NON-APPOINTMENT (OUTPATIENT)
Age: 40
End: 2025-01-23

## 2025-01-23 ENCOUNTER — APPOINTMENT (OUTPATIENT)
Dept: CARDIOLOGY | Facility: CLINIC | Age: 40
End: 2025-01-23

## 2025-01-23 VITALS
WEIGHT: 157 LBS | HEIGHT: 60 IN | OXYGEN SATURATION: 99 % | DIASTOLIC BLOOD PRESSURE: 78 MMHG | TEMPERATURE: 97.8 F | BODY MASS INDEX: 30.82 KG/M2 | SYSTOLIC BLOOD PRESSURE: 119 MMHG | HEART RATE: 76 BPM

## 2025-01-23 DIAGNOSIS — R03.0 ELEVATED BLOOD-PRESSURE READING, W/OUT DIAGNOSIS OF HYPERTENSION: ICD-10-CM

## 2025-01-23 DIAGNOSIS — R06.02 SHORTNESS OF BREATH: ICD-10-CM

## 2025-01-23 PROCEDURE — 99203 OFFICE O/P NEW LOW 30 MIN: CPT | Mod: 25

## 2025-01-23 PROCEDURE — 93000 ELECTROCARDIOGRAM COMPLETE: CPT

## 2025-01-27 PROBLEM — R03.0 BLOOD PRESSURE ELEVATED WITHOUT HISTORY OF HTN: Status: ACTIVE | Noted: 2025-01-27

## 2025-02-04 ENCOUNTER — APPOINTMENT (OUTPATIENT)
Dept: OBGYN | Facility: CLINIC | Age: 40
End: 2025-02-04

## 2025-02-04 VITALS
BODY MASS INDEX: 28.89 KG/M2 | DIASTOLIC BLOOD PRESSURE: 79 MMHG | HEIGHT: 62 IN | WEIGHT: 157 LBS | SYSTOLIC BLOOD PRESSURE: 120 MMHG

## 2025-02-04 DIAGNOSIS — O24.419 GESTATIONAL DIABETES MELLITUS IN PREGNANCY, UNSPECIFIED CONTROL: ICD-10-CM

## 2025-02-04 DIAGNOSIS — N94.6 DYSMENORRHEA, UNSPECIFIED: ICD-10-CM

## 2025-02-04 DIAGNOSIS — Z00.00 ENCOUNTER FOR GENERAL ADULT MEDICAL EXAMINATION W/OUT ABNORMAL FINDINGS: ICD-10-CM

## 2025-02-04 PROCEDURE — 76830 TRANSVAGINAL US NON-OB: CPT

## 2025-02-04 PROCEDURE — 99214 OFFICE O/P EST MOD 30 MIN: CPT | Mod: TH,25

## 2025-02-04 PROCEDURE — 76856 US EXAM PELVIC COMPLETE: CPT

## 2025-02-04 RX ORDER — NAPROXEN 500 MG/1
500 TABLET ORAL
Qty: 30 | Refills: 1 | Status: ACTIVE | COMMUNITY
Start: 2025-02-04 | End: 1900-01-01

## 2025-02-15 ENCOUNTER — LABORATORY RESULT (OUTPATIENT)
Age: 40
End: 2025-02-15

## 2025-03-19 ENCOUNTER — OUTPATIENT (OUTPATIENT)
Dept: OUTPATIENT SERVICES | Facility: HOSPITAL | Age: 40
LOS: 1 days | End: 2025-03-19
Payer: MEDICAID

## 2025-03-19 ENCOUNTER — RESULT REVIEW (OUTPATIENT)
Age: 40
End: 2025-03-19

## 2025-03-19 ENCOUNTER — APPOINTMENT (OUTPATIENT)
Dept: CV DIAGNOSITCS | Facility: HOSPITAL | Age: 40
End: 2025-03-19

## 2025-03-19 DIAGNOSIS — R06.02 SHORTNESS OF BREATH: ICD-10-CM

## 2025-03-19 PROCEDURE — 93306 TTE W/DOPPLER COMPLETE: CPT | Mod: 26

## 2025-03-24 ENCOUNTER — NON-APPOINTMENT (OUTPATIENT)
Age: 40
End: 2025-03-24

## 2025-04-12 ENCOUNTER — EMERGENCY (EMERGENCY)
Facility: HOSPITAL | Age: 40
LOS: 1 days | End: 2025-04-12
Attending: EMERGENCY MEDICINE | Admitting: STUDENT IN AN ORGANIZED HEALTH CARE EDUCATION/TRAINING PROGRAM
Payer: MEDICAID

## 2025-04-12 VITALS
RESPIRATION RATE: 16 BRPM | WEIGHT: 156.97 LBS | DIASTOLIC BLOOD PRESSURE: 71 MMHG | TEMPERATURE: 98 F | SYSTOLIC BLOOD PRESSURE: 122 MMHG | OXYGEN SATURATION: 99 % | HEART RATE: 77 BPM

## 2025-04-12 PROCEDURE — 99284 EMERGENCY DEPT VISIT MOD MDM: CPT

## 2025-04-12 NOTE — ED ADULT NURSE NOTE - NSFALLRISKINTERV_ED_ALL_ED

## 2025-04-12 NOTE — ED ADULT TRIAGE NOTE - CHIEF COMPLAINT QUOTE
Pt c/o back and head pain s/p slip and fall x5 days ago. Denies blood thinner use, LOC. Neuro intact. No hx. Well appearing

## 2025-04-12 NOTE — ED PROVIDER NOTE - OBJECTIVE STATEMENT
Patient is a 39-year-old female, with no past medical history, presenting to the emergency department after a fall this Monday.  Patient states on Monday, she had a mechanical fall after she slipped on a wet leaf in the rain and fell back hitting her back and the back of her head.  Patient denies loss of consciousness at that time.  Patient states she had 1 episode of emesis on Monday, after the fall,.  Patient states she has since been feeling nauseous.  Patient states she has been treating her symptoms with Tylenol/Advil at home, but pain has been progressively worse.  Patient states she has pain primarily in the middle of her spine.  Denies any vomiting, fever, chills.  Denies any numbness or weakness in the lower extremities.  Denies any abdominal pain.  Denies any visual disturbances.

## 2025-04-12 NOTE — ED PROVIDER NOTE - ATTENDING CONTRIBUTION TO CARE
Attending note:   After face to face evaluation of this patient, I concur with above noted hx, pe, and care plan for this patient. Puentes: 39-year-old female with no significant past medical history presents ED after a slip and fall 5 days ago.  Patient states she slipped on wet ground and fell backwards.  Hand or leg pain but has had significant back pain since then.  Patient denies any numbness or tingling and is been able to move arms and legs just fine.  Patient has also been having nausea with 1 episode of vomiting only.  Patient took Advil yesterday that helped somewhat.  No blurry vision but states she has been dizzy.  No abdominal pain.  Patient's vital signs are normal.  Patient has midline tenderness in the lower cervical and thoracic areas with some mild edema but no erythema or skin changes.  Patient is full range of motion of upper and lower extremities with normal strength and sensation and pulses.  Neck is soft supple full range of motion but that does elicit some pain.  Head is normocephalic atraumatic no hematomas noted.  Given significant midline tenderness without any significant paravertebral tenderness will get CT and give pain meds as needed.  At time of signout CT was still pending.  Will need reassessment after medication.

## 2025-04-12 NOTE — ED PROVIDER NOTE - NS ED ROS FT
GENERAL: No fever or chills  EYES: No change in vision  HEENT: No trouble swallowing or speaking  CARDIAC: No chest pain  PULMONARY: No cough or SOB  GI: +Nausea. No abdominal pain, no vomiting, no diarrhea or constipation  : No changes in urination  SKIN: No rashes  NEURO: No headache, no numbness  MSK: +Neck pain, back pain  Otherwise as HPI or negative.

## 2025-04-12 NOTE — ED PROVIDER NOTE - PATIENT PORTAL LINK FT
You can access the FollowMyHealth Patient Portal offered by [facility] by registering at the following website: [email or web address]. By joining [employer]’s FollowMyHealth portal, you will also be able to view your health information using other applications (apps) compatible with our system.

## 2025-04-12 NOTE — ED PROVIDER NOTE - NSFOLLOWUPINSTRUCTIONS_ED_ALL_ED_FT
Back Pain    You were seen in the Emergency Department today after a fall on Monday. You are having pain over your spine, and nausea. We ordered CT imaging of the spine and head.     The cause of your back pain may not be known and may include strain of muscles or ligaments, degeneration of the spinal disks, or arthritis. Occasionally the pain may radiate down your leg(s). Over-the-counter medicines to reduce pain and inflammation are often the most helpful. Stretching and remaining active frequently helps the healing process.     SEEK IMMEDIATE MEDICAL CARE IF YOU HAVE ANY OF THE FOLLOWING SYMPTOMS: bowel or bladder control problems, unusual weakness or numbness in your arms or legs, nausea or vomiting, abdominal pain, fever, dizziness/lightheadedness.    Follow up with spine center ([phone]), concussion clinic ([phone]), and your primary  for reevaluation.

## 2025-04-12 NOTE — ED ADULT NURSE NOTE - OBJECTIVE STATEMENT
pt received to intake room 1 c/o head and neck pain s/ps slip and fall 5 days ago outside in rainy weather. pt denies LOC. reports initially she felt fine but now she is experiencing dizziness, nausea and posterior head pressure. denies vision changes. last tetanus unknown. no obvious bruising, bleeding or deformity noted. pt received to intake room 1 c/o head and neck pain s/p slip and fall 5 days ago outside in rainy weather. pt denies LOC. reports initially she felt fine but now she is experiencing dizziness, nausea and posterior head pressure. denies vision changes. last tetanus unknown. no obvious bruising, bleeding or deformity noted.

## 2025-04-12 NOTE — ED PROVIDER NOTE - PROGRESS NOTE DETAILS
Morena MARIE:  Received sign out from my colleague Dr. Puentes pt presents s/p fall w head strike pending imaging at time of sign out, CT imaging non actionable  Pt assessed at beside. Pt resting comfortably, pain controlled, pt questions answered. Vital signs stable.  Strict return precautions were discussed. Pt expressed understanding.  would like to go home  informed about incidental findings  stable for dc.

## 2025-04-12 NOTE — ED PROVIDER NOTE - CLINICAL SUMMARY MEDICAL DECISION MAKING FREE TEXT BOX
Patient is a 39-year-old female, with no past medical history, presenting to the emergency department after a fall this Monday.  Patient states on Monday, she had a mechanical fall after she slipped on a wet leaf in the rain and fell back hitting her back and the back of her head.  Patient denies loss of consciousness at that time.  Patient states she had 1 episode of emesis on Monday, after the fall,.  Patient states she has since been feeling nauseous.  Patient states she has been treating her symptoms with Tylenol/Advil at home, but pain has been progressively worse.  Patient states she has pain primarily in the middle of her spine.  Denies any vomiting, fever, chills.  Denies any numbness or weakness in the lower extremities.  Denies any abdominal pain.  Denies any visual disturbances.    Patient has normal neurological exam on PE. Head appears atraumatic with no notable lacerations or abrasions. Patient does have midline tenderness along cervical spine down to lumbar spine. Patient endorses the pain is worst over midline spine.     Plan: CT Head No Cont, CT Cervical Spine, CT Thoraco/Lumbar Spine, Pain Control, Reassess  F/U with PCP if imaging normal.

## 2025-04-12 NOTE — ED PROVIDER NOTE - PHYSICAL EXAMINATION
Park Ruiz MD (PGY-1)  Physical Exam:    Gen: NAD, AOx3  Head: NCAT  HEENT: EOMI, PEERLA  Lung: CTAB, no respiratory distress, no wheezes/rhonchi/rales B/L  CV: RRR, no murmurs, rubs or gallops  Abd: soft, NT, ND, no guarding, no rigidity, no rebound tenderness, no CVA tenderness   MSK: +Midline tenderness over the cervical, thoracic and lumbar midline spine with minimal paravertebral tenderness appreciated. No visible deformities, ROM normal in UE/LE  Neuro: Finger to nose test normal w/o any dysmetria or hesitation. Pupils equal and reactive to light bilaterally. No focal sensory or motor deficits. Sensation intact to light touch all extremities.  Skin: Warm, well perfused, no rash, no leg swelling  Psych: normal affect, calm

## 2025-04-13 VITALS
OXYGEN SATURATION: 99 % | HEART RATE: 91 BPM | RESPIRATION RATE: 17 BRPM | DIASTOLIC BLOOD PRESSURE: 89 MMHG | TEMPERATURE: 99 F | SYSTOLIC BLOOD PRESSURE: 134 MMHG

## 2025-04-13 PROCEDURE — 70450 CT HEAD/BRAIN W/O DYE: CPT | Mod: 26

## 2025-04-13 PROCEDURE — 72131 CT LUMBAR SPINE W/O DYE: CPT | Mod: 26

## 2025-04-13 PROCEDURE — 72125 CT NECK SPINE W/O DYE: CPT | Mod: 26

## 2025-04-13 PROCEDURE — 72128 CT CHEST SPINE W/O DYE: CPT | Mod: 26

## 2025-07-23 NOTE — ED ADULT TRIAGE NOTE - AVIAN FLU SYMPTOMS
Chief Complaint     Heartburn, Nausea, and Vomiting    Patient or patient representative verbalized consent for the use of Ambient Listening during the visit with  DUTCH Kidd for chart documentation. 7/23/2025  11:02 EDT    History of Present Illness     History of Present Illness  The patient is a 20-year-old female who presents for GERD, nausea, vomiting, and abdominal pain.    She experiences daily abdominal pain, which can be severe and sharp, sometimes causing discomfort during breathing. The pain is not localized to a specific area and does not seem to be influenced by food intake. She has regular bowel movements and reports no current blood in her stool, although she had an instance of it a few years ago. She uses a stool softener as needed.    Nausea is severe enough to induce vomiting and is often accompanied by heat flashes and fainting spells. Migraines, which have transitioned from abdominal to head-based, exacerbate stomach issues. She experiences daily nausea, which intensifies during migraines. She is currently under the care of a neurologist for migraines.    She has been on pantoprazole for the past 6 months, which effectively manages reflux. Zofran provides relief when taken at the onset of nausea but is less effective once the nausea intensifies. She reports no difficulty swallowing.    Anxiety used to worsen symptoms, but this is no longer the case since starting Wellbutrin and Lexapro.             History      Past Medical History:   Diagnosis Date    ADHD     Anxiety     Depression        Past Surgical History:   Procedure Laterality Date    DENTAL PROCEDURE         Family History   Adopted: Yes        Current Medications        Current Outpatient Medications:     buPROPion XL (WELLBUTRIN XL) 150 MG 24 hr tablet, , Disp: , Rfl:     cetirizine (zyrTEC) 10 MG tablet, , Disp: , Rfl:     docusate sodium (COLACE) 100 MG capsule, , Disp: , Rfl:     escitalopram (LEXAPRO) 20 MG tablet,  ", Disp: , Rfl:     FeroSul 325 (65 Fe) MG tablet, , Disp: , Rfl:     Jolessa 0.15-0.03 MG per tablet, , Disp: , Rfl:     Lubricant Eye Drops PF 0.5 % solution, , Disp: , Rfl:     Laura 128 5 % ophthalmic ointment, , Disp: , Rfl:     Nurtec 75 MG dispersible tablet, , Disp: , Rfl:     ondansetron ODT (ZOFRAN-ODT) 8 MG disintegrating tablet, , Disp: , Rfl:     pantoprazole (PROTONIX) 40 MG EC tablet, , Disp: , Rfl:     dicyclomine (BENTYL) 20 MG tablet, Take 1 tablet by mouth 4 (Four) Times a Day As Needed (abdominal pain and diarrhea)., Disp: 120 tablet, Rfl: 1     Allergies     No Known Allergies    Social History       Social History     Social History Narrative    Not on file       Immunizations     Immunization:  Immunization History   Administered Date(s) Administered    COVID-19 (MODERNA) 12YRS+ (SPIKEVAX) 10/20/2024    COVID-19 (PFIZER) 12YRS+ (COMIRNATY) 09/23/2023          Objective     Objective     Vital Signs:   /78 (BP Location: Left arm, Patient Position: Sitting, Cuff Size: Adult)   Pulse (!) 122   Ht 157.5 cm (62.01\")   Wt 107 kg (234 lb 12.8 oz)   BMI 42.93 kg/m²       Physical Exam    Results      Result Review :   The following data was reviewed by: DUTCH Kidd on 07/23/2025:    CBC w/diff          4/28/2025    16:03   CBC w/Diff   WBC 8.82    RBC 4.73    Hemoglobin 11.9    Hematocrit 37.7    MCV 79.7    MCH 25.2    MCHC 31.6    RDW 13.6    Platelets 387    Neutrophil Rel % 52.4    Immature Granulocyte Rel % 0.1    Lymphocyte Rel % 38.2    Monocyte Rel % 7.4    Eosinophil Rel % 1.4    Basophil Rel % 0.5      CMP          4/28/2025    16:03   CMP   Glucose 72    BUN 8    Creatinine 0.70    EGFR 128.7    Sodium 138    Potassium 3.8    Chloride 102    Calcium 9.0    Total Protein 7.6    Albumin 4.3    Globulin 3.3    Total Bilirubin 0.2    Alkaline Phosphatase 98    AST (SGOT) 13    ALT (SGPT) 12    Albumin/Globulin Ratio 1.3    BUN/Creatinine Ratio 11.4    Anion Gap 14.0  "           Results  Labs   - Gluten allergy test: 06/2024, Negative           Assessment and Plan        Assessment and Plan    Diagnoses and all orders for this visit:    1. Generalized abdominal pain (Primary)  -     dicyclomine (BENTYL) 20 MG tablet; Take 1 tablet by mouth 4 (Four) Times a Day As Needed (abdominal pain and diarrhea).  Dispense: 120 tablet; Refill: 1  -     CT Abdomen Pelvis With Contrast; Future  -     Case Request; Standing  -     Case Request    2. Heartburn  -     Case Request; Standing  -     Case Request    3. Nausea and vomiting, unspecified vomiting type  -     CT Abdomen Pelvis With Contrast; Future  -     Case Request; Standing  -     Case Request    Other orders  -     Follow Anesthesia Guidelines / Protocol; Future  -     Verify NPO; Standing  -     Obtain Informed Consent; Standing        Assessment & Plan  1. Abdominal pain:  - CT scan of the abdomen with contrast ordered to identify potential causes.  - Dicyclomine prescribed to be taken as needed for abdominal pain, up to four times a day. If experiencing daily abdominal pain upon waking, take one dose at night before bedtime and another upon waking.    2. Nausea and vomiting:  - Upper endoscopy recommended to investigate the cause of persistent nausea and vomiting. Procedure to be performed under general anesthesia, informed about risks and benefits.  - Continue using Zofran as needed for nausea, aware of potential side effects such as constipation and dry mouth.    3. Gastroesophageal reflux disease (GERD):  - Pantoprazole effectively controlling reflux symptoms.  - Upper endoscopy recommended to further investigate the cause of GERD and rule out serious conditions. Procedure to be performed under general anesthesia, informed about risks and benefits.    4. Anxiety:  - Well controlled on Wellbutrin and Lexapro.      ESOPHAGOGASTRODUODENOSCOPY (N/A)  The risk of the endoscopy were discussed in detail. Possible risks/complications,  benefits, and alternatives to surgical or invasive procedure have been explained to patient and/or legal guardian; risks include bleeding, infection, and perforation. Patient has been evaluated and can tolerate anesthesia and/or sedation.       Follow Up        Follow Up   Return in about 6 months (around 1/23/2026) for abdominal pain, nausea.  Patient was given instructions and counseling regarding her condition or for health maintenance advice. Please see specific information pulled into the AVS if appropriate.     No

## 2025-08-12 ENCOUNTER — APPOINTMENT (OUTPATIENT)
Dept: OBGYN | Facility: CLINIC | Age: 40
End: 2025-08-12
Payer: MEDICAID

## 2025-08-12 VITALS
BODY MASS INDEX: 29.63 KG/M2 | WEIGHT: 161 LBS | DIASTOLIC BLOOD PRESSURE: 87 MMHG | HEIGHT: 62 IN | SYSTOLIC BLOOD PRESSURE: 129 MMHG

## 2025-08-12 DIAGNOSIS — Z01.419 ENCOUNTER FOR GYNECOLOGICAL EXAMINATION (GENERAL) (ROUTINE) W/OUT ABNORMAL FINDINGS: ICD-10-CM

## 2025-08-12 PROCEDURE — G0444 DEPRESSION SCREEN ANNUAL: CPT | Mod: 59

## 2025-08-12 PROCEDURE — 99459 PELVIC EXAMINATION: CPT

## 2025-08-12 PROCEDURE — 99395 PREV VISIT EST AGE 18-39: CPT | Mod: 25

## 2025-08-19 LAB
C TRACH RRNA SPEC QL NAA+PROBE: NOT DETECTED
CYTOLOGY CVX/VAG DOC THIN PREP: NORMAL
HPV HIGH+LOW RISK DNA PNL CVX: NOT DETECTED
N GONORRHOEA RRNA SPEC QL NAA+PROBE: NOT DETECTED
SOURCE AMPLIFICATION: NORMAL